# Patient Record
Sex: MALE | Race: WHITE | NOT HISPANIC OR LATINO | Employment: OTHER | URBAN - METROPOLITAN AREA
[De-identification: names, ages, dates, MRNs, and addresses within clinical notes are randomized per-mention and may not be internally consistent; named-entity substitution may affect disease eponyms.]

---

## 2017-02-03 ENCOUNTER — OFFICE VISIT (OUTPATIENT)
Dept: FAMILY MEDICINE | Facility: CLINIC | Age: 47
End: 2017-02-03
Payer: COMMERCIAL

## 2017-02-03 VITALS
TEMPERATURE: 98.1 F | HEIGHT: 68 IN | BODY MASS INDEX: 29.1 KG/M2 | RESPIRATION RATE: 20 BRPM | OXYGEN SATURATION: 98 % | DIASTOLIC BLOOD PRESSURE: 80 MMHG | WEIGHT: 192 LBS | HEART RATE: 61 BPM | SYSTOLIC BLOOD PRESSURE: 127 MMHG

## 2017-02-03 DIAGNOSIS — M25.511 CHRONIC RIGHT SHOULDER PAIN: ICD-10-CM

## 2017-02-03 DIAGNOSIS — Z00.01 ENCOUNTER FOR ROUTINE ADULT MEDICAL EXAM WITH ABNORMAL FINDINGS: Primary | ICD-10-CM

## 2017-02-03 DIAGNOSIS — M25.50 PAIN IN JOINT, MULTIPLE SITES: ICD-10-CM

## 2017-02-03 DIAGNOSIS — G89.29 CHRONIC RIGHT SHOULDER PAIN: ICD-10-CM

## 2017-02-03 LAB
CRP SERPL-MCNC: <2.9 MG/L (ref 0–8)
ERYTHROCYTE [SEDIMENTATION RATE] IN BLOOD BY WESTERGREN METHOD: 4 MM/H (ref 0–15)

## 2017-02-03 PROCEDURE — 80061 LIPID PANEL: CPT | Performed by: FAMILY MEDICINE

## 2017-02-03 PROCEDURE — 85652 RBC SED RATE AUTOMATED: CPT | Performed by: FAMILY MEDICINE

## 2017-02-03 PROCEDURE — 99396 PREV VISIT EST AGE 40-64: CPT | Performed by: FAMILY MEDICINE

## 2017-02-03 PROCEDURE — 36415 COLL VENOUS BLD VENIPUNCTURE: CPT | Performed by: FAMILY MEDICINE

## 2017-02-03 PROCEDURE — 99212 OFFICE O/P EST SF 10 MIN: CPT | Mod: 25 | Performed by: FAMILY MEDICINE

## 2017-02-03 PROCEDURE — 86140 C-REACTIVE PROTEIN: CPT | Performed by: FAMILY MEDICINE

## 2017-02-03 PROCEDURE — 80053 COMPREHEN METABOLIC PANEL: CPT | Performed by: FAMILY MEDICINE

## 2017-02-03 ASSESSMENT — ANXIETY QUESTIONNAIRES
1. FEELING NERVOUS, ANXIOUS, OR ON EDGE: NOT AT ALL
5. BEING SO RESTLESS THAT IT IS HARD TO SIT STILL: NOT AT ALL
7. FEELING AFRAID AS IF SOMETHING AWFUL MIGHT HAPPEN: NOT AT ALL
2. NOT BEING ABLE TO STOP OR CONTROL WORRYING: NOT AT ALL
GAD7 TOTAL SCORE: 0
3. WORRYING TOO MUCH ABOUT DIFFERENT THINGS: NOT AT ALL
IF YOU CHECKED OFF ANY PROBLEMS ON THIS QUESTIONNAIRE, HOW DIFFICULT HAVE THESE PROBLEMS MADE IT FOR YOU TO DO YOUR WORK, TAKE CARE OF THINGS AT HOME, OR GET ALONG WITH OTHER PEOPLE: NOT DIFFICULT AT ALL
6. BECOMING EASILY ANNOYED OR IRRITABLE: NOT AT ALL

## 2017-02-03 ASSESSMENT — PATIENT HEALTH QUESTIONNAIRE - PHQ9: 5. POOR APPETITE OR OVEREATING: NOT AT ALL

## 2017-02-03 NOTE — NURSING NOTE
"Chief Complaint   Patient presents with     Physical       Initial /80 mmHg  Pulse 61  Temp(Src) 98.1  F (36.7  C) (Oral)  Resp 20  Ht 5' 7.5\" (1.715 m)  Wt 192 lb (87.091 kg)  BMI 29.61 kg/m2  SpO2 98% Estimated body mass index is 29.61 kg/(m^2) as calculated from the following:    Height as of this encounter: 5' 7.5\" (1.715 m).    Weight as of this encounter: 192 lb (87.091 kg).  BP completed using cuff size: regine Valencia CMA      "

## 2017-02-03 NOTE — PATIENT INSTRUCTIONS
Preventive Health Recommendations  Male Ages 40 to 49    Yearly exam:             See your health care provider every year in order to  o   Review health changes.   o   Discuss preventive care.    o   Review your medicines if your doctor has prescribed any.    You should be tested each year for STDs (sexually transmitted diseases) if you re at risk.     Have a cholesterol test every 5 years.     Have a colonoscopy (test for colon cancer) if someone in your family has had colon cancer or polyps before age 50.     After age 45, have a diabetes test (fasting glucose). If you are at risk for diabetes, you should have this test every 3 years.      Talk with your health care provider about whether or not a prostate cancer screening test (PSA) is right for you.    Shots: Get a flu shot each year. Get a tetanus shot every 10 years.     Nutrition:    Eat at least 5 servings of fruits and vegetables daily.     Eat whole-grain bread, whole-wheat pasta and brown rice instead of white grains and rice.     Talk to your provider about Calcium and Vitamin D.     Lifestyle    Exercise for at least 150 minutes a week (30 minutes a day, 5 days a week). This will help you control your weight and prevent disease.     Limit alcohol to one drink per day.     No smoking.     Wear sunscreen to prevent skin cancer.     See your dentist every six months for an exam and cleaning.      Preventive Health Recommendations  Male Ages 40 to 49    Yearly exam:             See your health care provider every year in order to  o   Review health changes.   o   Discuss preventive care.    o   Review your medicines if your doctor has prescribed any.    You should be tested each year for STDs (sexually transmitted diseases) if you re at risk.     Have a cholesterol test every 5 years.     Have a colonoscopy (test for colon cancer) if someone in your family has had colon cancer or polyps before age 50.     After age 45, have a diabetes test (fasting  glucose). If you are at risk for diabetes, you should have this test every 3 years.      Talk with your health care provider about whether or not a prostate cancer screening test (PSA) is right for you.    Shots: Get a flu shot each year. Get a tetanus shot every 10 years.     Nutrition:    Eat at least 5 servings of fruits and vegetables daily.     Eat whole-grain bread, whole-wheat pasta and brown rice instead of white grains and rice.     Talk to your provider about Calcium and Vitamin D.     Lifestyle    Exercise for at least 150 minutes a week (30 minutes a day, 5 days a week). This will help you control your weight and prevent disease.     Limit alcohol to one drink per day.     No smoking.     Wear sunscreen to prevent skin cancer.     See your dentist every six months for an exam and cleaning.

## 2017-02-03 NOTE — MR AVS SNAPSHOT
After Visit Summary   2/3/2017    Gregory Aguilera    MRN: [id number]           Patient Information     Date Of Birth          1970        Visit Information        Provider Department      2/3/2017 3:00 PM David Santoyo MD [facility]        Today's Diagnoses     Encounter for routine adult medical exam with abnormal findings    -  1     Need for prophylactic vaccination and inoculation against influenza         Pain in joint, multiple sites           Care Instructions      Preventive Health Recommendations  Male Ages 40 to 49    Yearly exam:             See your health care provider every year in order to  o   Review health changes.   o   Discuss preventive care.    o   Review your medicines if your doctor has prescribed any.    You should be tested each year for STDs (sexually transmitted diseases) if you re at risk.     Have a cholesterol test every 5 years.     Have a colonoscopy (test for colon cancer) if someone in your family has had colon cancer or polyps before age 50.     After age 45, have a diabetes test (fasting glucose). If you are at risk for diabetes, you should have this test every 3 years.      Talk with your health care provider about whether or not a prostate cancer screening test (PSA) is right for you.    Shots: Get a flu shot each year. Get a tetanus shot every 10 years.     Nutrition:    Eat at least 5 servings of fruits and vegetables daily.     Eat whole-grain bread, whole-wheat pasta and brown rice instead of white grains and rice.     Talk to your provider about Calcium and Vitamin D.     Lifestyle    Exercise for at least 150 minutes a week (30 minutes a day, 5 days a week). This will help you control your weight and prevent disease.     Limit alcohol to one drink per day.     No smoking.     Wear sunscreen to prevent skin cancer.     See your dentist every six months for an exam and cleaning.      Preventive Health Recommendations  Male Ages 40  to 49    Yearly exam:             See your health care provider every year in order to  o   Review health changes.   o   Discuss preventive care.    o   Review your medicines if your doctor has prescribed any.    You should be tested each year for STDs (sexually transmitted diseases) if you re at risk.     Have a cholesterol test every 5 years.     Have a colonoscopy (test for colon cancer) if someone in your family has had colon cancer or polyps before age 50.     After age 45, have a diabetes test (fasting glucose). If you are at risk for diabetes, you should have this test every 3 years.      Talk with your health care provider about whether or not a prostate cancer screening test (PSA) is right for you.    Shots: Get a flu shot each year. Get a tetanus shot every 10 years.     Nutrition:    Eat at least 5 servings of fruits and vegetables daily.     Eat whole-grain bread, whole-wheat pasta and brown rice instead of white grains and rice.     Talk to your provider about Calcium and Vitamin D.     Lifestyle    Exercise for at least 150 minutes a week (30 minutes a day, 5 days a week). This will help you control your weight and prevent disease.     Limit alcohol to one drink per day.     No smoking.     Wear sunscreen to prevent skin cancer.     See your dentist every six months for an exam and cleaning.            Follow-ups after your visit        Who to contact     If you have questions or need follow up information about today's clinic visit or your schedule please contact [facility] directly at [phone].  Normal or non-critical lab and imaging results will be communicated to you by MyChart, letter or phone within 4 business days after the clinic has received the results. If you do not hear from us within 7 days, please contact the clinic through MyChart or phone. If you have a critical or abnormal lab result, we will notify you by phone as soon as possible.  Submit refill requests  "through [employer] or call your pharmacy and they will forward the refill request to us. Please allow 3 business days for your refill to be completed.          Additional Information About Your Visit        [employer] Information     [employer] gives you secure access to your electronic health record. If you see a primary care provider, you can also send messages to your care team and make appointments. If you have questions, please call your primary care clinic.  If you do not have a primary care provider, please call [phone] and they will assist you.        Care EveryWhere ID     This is your Care EveryWhere ID. This could be used by other organizations to access your [facility] medical records  [id number]        Your Vitals Were     Pulse Temperature Respirations Height BMI (Body Mass Index) Pulse Oximetry    61 98.1  F (36.7  C) (Oral) 20 5' 7.5\" (1.715 m) 29.61 kg/m2 98%       Blood Pressure from Last 3 Encounters:   02/03/17 127/80   07/07/16 112/88   05/24/16 132/88    Weight from Last 3 Encounters:   02/03/17 192 lb (87.091 kg)   07/07/16 192 lb (87.091 kg)   05/24/16 198 lb 6.4 oz (89.994 kg)              We Performed the Following     Comprehensive metabolic panel (BMP + Alb, Alk Phos, ALT, AST, Total. Bili, TP)     CRP, inflammation     ESR: Erythrocyte sedimentation rate     JUST IN CASE     LIPID REFLEX TO DIRECT LDL PANEL        Primary Care Provider Office Phone # Fax #    David Satnoyo [phone]       [facility] [address]  [facility] [address]        Thank you!     Thank you for choosing [facility]  for your care. Our goal is always to provide you with excellent care. Hearing back from our patients is one way we can continue to improve our services. Please take a few minutes to complete the written survey that you may receive in the mail after your visit with us. Thank you!             Your Updated Medication List - Protect others around you: Learn " how to safely use, store and throw away your medicines at www.disposemymeds.org.          This list is accurate as of: 2/3/17  3:35 PM.  Always use your most recent med list.                   Brand Name Dispense Instructions for use    fluticasone 50 MCG/BLIST Aepb    FLOVENT DISKUS     Inhale 1 puff into the lungs every 12 hours       lisinopril 20 MG tablet    PRINIVIL/ZESTRIL    90 tablet    Take 1 tablet (20 mg) by mouth daily       loratadine 10 MG ODT tab    CLARITIN REDITABS     Take 10 mg by mouth daily       SINGULAIR PO      Take 10 mg by mouth as needed

## 2017-02-03 NOTE — PROGRESS NOTES
"  SUBJECTIVE:     CC: Gregory Aguilera is an 46 year old male who presents for preventative health visit.     Healthy Habits:    Do you get at least three servings of calcium containing foods daily (dairy, green leafy vegetables, etc.)? {YES/NO, DAIRY INTAKE:149632::\"yes\"}    Amount of exercise or daily activities, outside of work: {AMOUNT EXERCISE:613528}    Problems taking medications regularly {Yes /No default:348306::\"No\"}    Medication side effects: {Yes /No default.:762502::\"No\"}    Have you had an eye exam in the past two years? {YESNOBLANK:541721}    Do you see a dentist twice per year? {YESNOBLANK:398110}    Do you have sleep apnea, excessive snoring or daytime drowsiness?{YESNOBLANK:379439}    {Outside tests to abstract? :828015}    {additional problems to add:442446}    Today's PHQ-2 Score:   PHQ-2 ( 1999 Pfizer) 2/3/2017 7/7/2016   Q1: Little interest or pleasure in doing things - 0   Q2: Feeling down, depressed or hopeless - 0   PHQ-2 Score - 0   Little interest or pleasure in doing things Not at all -   Feeling down, depressed or hopeless Not at all -   PHQ-2 Score 0 -     {PHQ-2 LOOK IN ASSESSMENTS :981098}  Abuse: Current or Past(Physical, Sexual or Emotional)- {YES/NO/NA:570106}  Do you feel safe in your environment - {YES/NO/NA:243901}    Social History   Substance Use Topics     Smoking status: Never Smoker      Smokeless tobacco: Never Used     Alcohol Use: Yes      Comment: sporatically on weekends     {ETOH AUDIT:401876}    Last PSA:   PSA   Date Value Ref Range Status   05/13/2009 0.67 0 - 4 ug/L Final       Recent Labs   Lab Test  05/24/16   1002  02/18/15   1538  03/14/14   1019   CHOL  222*  210*  179   HDL  42  48  42   LDL  Cannot estimate LDL when triglyceride exceeds 400 mg/dL  126*  120  97   TRIG  413*  212*  203*   CHOLHDLRATIO   --   4.4  4.3   NHDL  180*   --    --        Reviewed orders with patient. Reviewed health maintenance and updated orders accordingly - " "{Yes/No:402295::\"Yes\"}    All Histories reviewed and updated in Epic.  {HISTORY OPTIONS:860580}    ROS:  {MALE ROS-adult preventive care package:623594::\"C: NEGATIVE for fever, chills, change in weight\",\"I: NEGATIVE for worrisome rashes, moles or lesions\",\"E: NEGATIVE for vision changes or irritation\",\"ENT: NEGATIVE for ear, mouth and throat problems\",\"R: NEGATIVE for significant cough or SOB\",\"CV: NEGATIVE for chest pain, palpitations or peripheral edema\",\"GI: NEGATIVE for nausea, abdominal pain, heartburn, or change in bowel habits\",\" male: negative for dysuria, hematuria, decreased urinary stream, erectile dysfunction, urethral discharge\",\"M: NEGATIVE for significant arthralgias or myalgia\",\"N: NEGATIVE for weakness, dizziness or paresthesias\",\"P: NEGATIVE for changes in mood or affect\"}    {CHRONICPROBDATA:372641}  OBJECTIVE:     There were no vitals taken for this visit.  EXAM:  {Exam Choices:593529}    ASSESSMENT/PLAN:     {Diag Picklist:851411}    COUNSELING:  {MALE COUNSELING MESSAGES:418935::\"Reviewed preventive health counseling, as reflected in patient instructions\"}    {Blood Pressure - Adult Preventive:163199}     reports that he has never smoked. He has never used smokeless tobacco.  {Tobacco Cessation needed for ACO -- Delete if patient is a non-smoker:878285}  Estimated body mass index is 29.20 kg/(m^2) as calculated from the following:    Height as of 5/3/16: 5' 8\" (1.727 m).    Weight as of 7/7/16: 192 lb (87.091 kg).   {Weight Management Plan needed for ACO:434913}    Counseling Resources:  ATP IV Guidelines  Pooled Cohorts Equation Calculator  FRAX Risk Assessment  ICSI Preventive Guidelines  Dietary Guidelines for Americans, 2010  USDA's MyPlate  ASA Prophylaxis  Lung CA Screening    David Santoyo MD  [facility]  "

## 2017-02-03 NOTE — PROGRESS NOTES
SUBJECTIVE:     CC: Gregory Aguilera is an 46 year old male who presents for preventative health visit.     Physical  Annual:     Getting at least 3 servings of Calcium per day::  Yes    Bi-annual eye exam::  Yes    Dental care twice a year::  Yes    Sleep apnea or symptoms of sleep apnea::  Daytime drowsiness and Excessive snoring    Diet::  Regular (no restrictions)    Frequency of exercise::  1 day/week    Duration of exercise::  Less than 15 minutes    Taking medications regularly::  Yes    Additional concerns today::  YES  1; arthralgias. Stiffness at times. Not am more than a few seconds affects the DIP and PIP joints. No catching nor swelling noted. Other joints where he has some trouble include the right shoulder, back and bilateral knees.   2: gerd-still present. meds taking intermittently and helpful.       Musculoskeletal problem/pain      Duration: couple years but worsened, comes and goes    Description  Location: aches in right and left hands, pain in lower back, upper gluteus    Intensity:  mild, moderate    Accompanying signs and symptoms: radiation of pain to right leg    History  Previous similar problem: YES- ongoing for a couple years but the back pain is in a different location  Previous evaluation:  none    Precipitating or alleviating factors:  Trauma or overuse: YES- was hit with a puck during hockey 1 month ago  Aggravating factors include: sitting    Therapies tried and outcome: heat, acetaminophen and NSAID - ibuprofen, just a little relief       Today's PHQ-2 Score:   PHQ-2 ( 1999 Pfizer) 2/3/2017   Q1: Little interest or pleasure in doing things -   Q2: Feeling down, depressed or hopeless -   PHQ-2 Score -   Little interest or pleasure in doing things Not at all   Feeling down, depressed or hopeless Not at all   PHQ-2 Score 0       Abuse: Current or Past(Physical, Sexual or Emotional)- No  Do you feel safe in your environment - Yes    Social History   Substance Use Topics     Smoking  "status: Never Smoker      Smokeless tobacco: Never Used     Alcohol Use: Yes      Comment: sporatically on weekends     The patient does not drink >3 drinks per day nor >7 drinks per week.    Last PSA:   PSA   Date Value Ref Range Status   05/13/2009 0.67 0 - 4 ug/L Final       Recent Labs   Lab Test  05/24/16   1002  02/18/15   1538  03/14/14   1019   CHOL  222*  210*  179   HDL  42  48  42   LDL  Cannot estimate LDL when triglyceride exceeds 400 mg/dL  126*  120  97   TRIG  413*  212*  203*   CHOLHDLRATIO   --   4.4  4.3   NHDL  180*   --    --        Reviewed orders with patient. Reviewed health maintenance and updated orders accordingly - Yes    All Histories reviewed and updated in Epic.      ROS:  C: NEGATIVE for fever, chills, change in weight  I: NEGATIVE for worrisome rashes, moles or lesions  E: NEGATIVE for vision changes or irritation  ENT: NEGATIVE for ear, mouth and throat problems  R: NEGATIVE for significant cough or SOB  CV: NEGATIVE for chest pain, palpitations or peripheral edema   male: negative for dysuria, hematuria, decreased urinary stream, erectile dysfunction, urethral discharge  N: NEGATIVE for weakness, dizziness or paresthesias  P: NEGATIVE for changes in mood or affect    Problem list, Medication list, Allergies, and Medical/Social/Surgical histories reviewed in [facility] and updated as appropriate.  OBJECTIVE:     /80 mmHg  Pulse 61  Temp(Src) 98.1  F (36.7  C) (Oral)  Resp 20  Ht 5' 7.5\" (1.715 m)  Wt 192 lb (87.091 kg)  BMI 29.61 kg/m2  SpO2 98%  EXAM:  GENERAL: healthy, alert and no distress  EYES: Eyes grossly normal to inspection, PERRL and conjunctivae and sclerae normal  HENT: ear canals and TM's normal, nose and mouth without ulcers or lesions  NECK: no adenopathy, no asymmetry, masses, or scars and thyroid normal to palpation  RESP: lungs clear to auscultation - no rales, rhonchi or wheezes  CV: regular rate and rhythm, normal S1 S2, no S3 or S4, no murmur, click or " "rub, no peripheral edema and peripheral pulses strong  ABDOMEN: soft, nontender, no hepatosplenomegaly, no masses and bowel sounds normal  MS: no gross musculoskeletal defects noted, no edema  SKIN: no suspicious lesions or rashes  NEURO: Normal strength and tone, mentation intact and speech normal  PSYCH: mentation appears normal, affect normal/bright  Joint exam is normal. Mild painful arc on right shoulder.     ASSESSMENT/PLAN:         ICD-10-CM    1. Encounter for routine adult medical exam with abnormal findings Z00.01 LIPID REFLEX TO DIRECT LDL PANEL     JUST IN CASE     Comprehensive metabolic panel (BMP + Alb, Alk Phos, ALT, AST, Total. Bili, TP)   2. Pain in joint, multiple sites M25.50 ESR: Erythrocyte sedimentation rate     Comprehensive metabolic panel (BMP + Alb, Alk Phos, ALT, AST, Total. Bili, TP)     CRP, inflammation     OFFICE/OUTPT VISIT,EST,LEVL II   3. Chronic right shoulder pain M25.511 OFFICE/OUTPT VISIT,EST,LEVL II    G89.29    consdir physical therapy for jont. Use of OTC  meds. discussed for gerd and joint pain    COUNSELING:   Reviewed preventive health counseling, as reflected in patient instructions       Regular exercise       Healthy diet/nutrition       Colon cancer screening    BP Screening:   Last 3 BP Readings:    BP Readings from Last 3 Encounters:   02/03/17 127/80   07/07/16 112/88   05/24/16 132/88       The following was recommended to the patient:  Re-screen BP within a year and recommended lifestyle modifications       reports that he has never smoked. He has never used smokeless tobacco.    Estimated body mass index is 29.61 kg/(m^2) as calculated from the following:    Height as of this encounter: 5' 7.5\" (1.715 m).    Weight as of this encounter: 192 lb (87.091 kg).   Weight management plan: Discussed healthy diet and exercise guidelines and patient will follow up in 12 months in clinic to re-evaluate.    Counseling Resources:  ATP IV Guidelines  Pooled Cohorts Equation " Calculator  FRAX Risk Assessment  ICSI Preventive Guidelines  Dietary Guidelines for Americans, 2010  USDA's MyPlate  ASA Prophylaxis  Lung CA Screening    David Santoyo MD  [facility]  Answers for HPI/ROS submitted by the patient on 2/3/2017   PHQ-2 Depressed: Not at all, Not at all  PHQ-2 Score: 0

## 2017-02-04 LAB
ALBUMIN SERPL-MCNC: 4.3 G/DL (ref 3.4–5)
ALP SERPL-CCNC: 56 U/L (ref 40–150)
ALT SERPL W P-5'-P-CCNC: 19 U/L (ref 0–70)
ANION GAP SERPL CALCULATED.3IONS-SCNC: 9 MMOL/L (ref 3–14)
AST SERPL W P-5'-P-CCNC: 11 U/L (ref 0–45)
BILIRUB SERPL-MCNC: 0.8 MG/DL (ref 0.2–1.3)
BUN SERPL-MCNC: 15 MG/DL (ref 7–30)
CALCIUM SERPL-MCNC: 9.2 MG/DL (ref 8.5–10.1)
CHLORIDE SERPL-SCNC: 106 MMOL/L (ref 94–109)
CHOLEST SERPL-MCNC: 219 MG/DL
CO2 SERPL-SCNC: 27 MMOL/L (ref 20–32)
CREAT SERPL-MCNC: 0.96 MG/DL (ref 0.66–1.25)
GFR SERPL CREATININE-BSD FRML MDRD: 84 ML/MIN/1.7M2
GLUCOSE SERPL-MCNC: 97 MG/DL (ref 70–99)
HDLC SERPL-MCNC: 42 MG/DL
LDLC SERPL CALC-MCNC: 125 MG/DL
NONHDLC SERPL-MCNC: 177 MG/DL
POTASSIUM SERPL-SCNC: 4 MMOL/L (ref 3.4–5.3)
PROT SERPL-MCNC: 7.6 G/DL (ref 6.8–8.8)
SODIUM SERPL-SCNC: 142 MMOL/L (ref 133–144)
TRIGL SERPL-MCNC: 259 MG/DL

## 2017-02-04 ASSESSMENT — ANXIETY QUESTIONNAIRES: GAD7 TOTAL SCORE: 0

## 2017-02-04 ASSESSMENT — PATIENT HEALTH QUESTIONNAIRE - PHQ9: SUM OF ALL RESPONSES TO PHQ QUESTIONS 1-9: 2

## 2017-02-28 ENCOUNTER — OFFICE VISIT (OUTPATIENT)
Dept: FAMILY MEDICINE | Facility: CLINIC | Age: 47
End: 2017-02-28
Payer: COMMERCIAL

## 2017-02-28 ENCOUNTER — MYC MEDICAL ADVICE (OUTPATIENT)
Dept: FAMILY MEDICINE | Facility: CLINIC | Age: 47
End: 2017-02-28

## 2017-02-28 VITALS
SYSTOLIC BLOOD PRESSURE: 124 MMHG | RESPIRATION RATE: 16 BRPM | HEART RATE: 88 BPM | OXYGEN SATURATION: 99 % | DIASTOLIC BLOOD PRESSURE: 90 MMHG | BODY MASS INDEX: 30.24 KG/M2 | WEIGHT: 196 LBS | TEMPERATURE: 99.1 F

## 2017-02-28 DIAGNOSIS — M54.10 RADICULOPATHY, UNSPECIFIED SPINAL REGION: Primary | ICD-10-CM

## 2017-02-28 PROCEDURE — 99214 OFFICE O/P EST MOD 30 MIN: CPT | Performed by: FAMILY MEDICINE

## 2017-02-28 RX ORDER — METHYLPREDNISOLONE 4 MG
TABLET, DOSE PACK ORAL
Qty: 21 TABLET | Refills: 0 | Status: SHIPPED | OUTPATIENT
Start: 2017-02-28 | End: 2017-03-21

## 2017-02-28 NOTE — NURSING NOTE
"Chief Complaint   Patient presents with     Musculoskeletal Problem     leg pain       Initial BP (!) 130/100 (BP Location: Right arm, Patient Position: Chair, Cuff Size: Adult Large)  Pulse 88  Temp 99.1  F (37.3  C) (Oral)  Resp 16  Wt 196 lb (88.9 kg)  SpO2 99%  BMI 30.24 kg/m2 Estimated body mass index is 30.24 kg/(m^2) as calculated from the following:    Height as of 2/3/17: 5' 7.5\" (1.715 m).    Weight as of this encounter: 196 lb (88.9 kg).  Medication Reconciliation: complete     Elenita Bennett CMA      "

## 2017-02-28 NOTE — PROGRESS NOTES
SUBJECTIVE:                                                    Gregory Aguilera is a 47 year old male who presents to clinic today for the following health issues:      Musculoskeletal problem/pain      Duration: 3 weeks    Description  Location: right glut, sometimes the low back    Intensity:  moderate, severe    Accompanying signs and symptoms: radiation of pain to down whole leg and tingling    History  Previous similar problem: no   Previous evaluation:  none    Precipitating or alleviating factors:  Trauma or overuse: YES- maybe from hockey 6 weeks ago, fell and got hurt but pain didn't start until 3 weeks later    Aggravating factors include: sitting to standing, standing to sitting, overuse     Therapies tried and outcome: rest/inactivity, heat, acetaminophen and NSAID - Ibuprofen      Hit on the right buttock in hockey and fell onto back both about 6 weeks ago.     No numbness/tingling nor weakness.     No fever nor gu, gi symptoms     No const symptoms.     OBJECTIVE: /90  Pulse 88  Temp 99.1  F (37.3  C) (Oral)  Resp 16  Wt 196 lb (88.9 kg)  SpO2 99%  BMI 30.24 kg/m2  Exam:  GENERAL APPEARANCE: healthy, alert and no distress  EYES: normal   MS: Lumbosacral spine area reveals no local tenderness or mass.  Painful and reduced LS ROM noted. Straight leg raise is negative  bilaterally. DTR's, motor strength and sensation normal.     X-Ray: not indicated.   SKIN: no suspicious lesions or rashes  NEURO: Normal strength and tone, sensory exam grossly normal, mentation intact and speech normal  PSYCH: mentation appears normal and affect normal/bright       ICD-10-CM    1. Radiculopathy, unspecified spinal region M54.10 methylPREDNISolone (MEDROL DOSEPAK) 4 MG tablet     [facility], AND CHIROPRACTIC REFERRAL   unclear etiology. Could be piriformis pain v lumbosacral radilultis from disc/similar at the spinal cord. Symptomatic therapy and follow up discussed

## 2017-02-28 NOTE — MR AVS SNAPSHOT
After Visit Summary   2/28/2017    Gregory Aguilera    MRN: [id number]           Patient Information     Date Of Birth          1970        Visit Information        Provider Department      2/28/2017 3:20 PM David Santoyo MD [facility]        Today's Diagnoses     Radiculopathy, unspecified spinal region    -  1       Follow-ups after your visit        Additional Services     [facility] PT, HAND, AND CHIROPRACTIC REFERRAL       **This order will print in the [facility] Scheduling Office**    Physical Therapy, Hand Therapy and Chiropractic Care are available through:    *[facility]  *[facility]  *[facility]    Call one number to schedule at any of the above locations: [phone].    Your provider has referred you to: Physical Therapy at [facility] or [facility]    Indication/Reason for Referral: Low Back Pain  Onset of Illness: 3 weeks  Therapy Orders: Evaluate and Treat  Special Programs: None  Special Request:     Dhaval Puentes      Additional Comments for the Therapist or Chiropractor:      Please be aware that coverage of these services is subject to the terms and limitations of your health insurance plan.  Call member services at your health plan with any benefit or coverage questions.      Please bring the following to your appointment:    *Your personal calendar for scheduling future appointments  *Comfortable clothing                  Who to contact     If you have questions or need follow up information about today's clinic visit or your schedule please contact [facility] directly at [phone].  Normal or non-critical lab and imaging results will be communicated to you by MyChart, letter or phone within 4 business days after the clinic has received the results. If you do not hear from us within 7 days, please contact the clinic through MyChart or phone. If you have a critical or abnormal lab result, we will  notify you by phone as soon as possible.  Submit refill requests through Friendsee or call your pharmacy and they will forward the refill request to us. Please allow 3 business days for your refill to be completed.          Additional Information About Your Visit        OrganizedWisdomharOctro Information     Friendsee gives you secure access to your electronic health record. If you see a primary care provider, you can also send messages to your care team and make appointments. If you have questions, please call your primary care clinic.  If you do not have a primary care provider, please call [phone] and they will assist you.        Care EveryWhere ID     This is your Care EveryWhere ID. This could be used by other organizations to access your [place] medical records  [id number]        Your Vitals Were     Pulse Temperature Respirations Pulse Oximetry BMI (Body Mass Index)       88 99.1  F (37.3  C) (Oral) 16 99% 30.24 kg/m2        Blood Pressure from Last 3 Encounters:   02/28/17 124/90   02/03/17 127/80   07/07/16 112/88    Weight from Last 3 Encounters:   02/28/17 196 lb (88.9 kg)   02/03/17 192 lb (87.1 kg)   07/07/16 192 lb (87.1 kg)              We Performed the Following     [facility] PT, HAND, AND CHIROPRACTIC REFERRAL          Today's Medication Changes          These changes are accurate as of: 2/28/17  4:46 PM.  If you have any questions, ask your nurse or doctor.               Start taking these medicines.        Dose/Directions    methylPREDNISolone 4 MG tablet   Commonly known as:  MEDROL DOSEPAK   Used for:  Radiculopathy, unspecified spinal region   Started by:  David Santoyo MD        Follow package instructions   Quantity:  21 tablet   Refills:  0            Where to get your medicines      These medications were sent to [place] [address] [place] [address]     Phone:  [phone]     methylPREDNISolone 4 MG tablet                Primary Care  Provider Office Phone # Fax #    David Santoyo [phone]       [facility] [address]  [facility] [address]        Thank you!     Thank you for choosing [facility]  for your care. Our goal is always to provide you with excellent care. Hearing back from our patients is one way we can continue to improve our services. Please take a few minutes to complete the written survey that you may receive in the mail after your visit with us. Thank you!             Your Updated Medication List - Protect others around you: Learn how to safely use, store and throw away your medicines at www.disposemymeds.org.          This list is accurate as of: 2/28/17  4:46 PM.  Always use your most recent med list.                   Brand Name Dispense Instructions for use    fluticasone 50 MCG/BLIST Aepb    FLOVENT DISKUS     Inhale 1 puff into the lungs every 12 hours       lisinopril 20 MG tablet    PRINIVIL/ZESTRIL    90 tablet    Take 1 tablet (20 mg) by mouth daily       loratadine 10 MG ODT tab    CLARITIN REDITABS     Take 10 mg by mouth daily       methylPREDNISolone 4 MG tablet    MEDROL DOSEPAK    21 tablet    Follow package instructions       SINGULAIR PO      Take 10 mg by mouth as needed

## 2017-02-28 NOTE — TELEPHONE ENCOUNTER
.This Asthmatrackerhart message is being routed to provider for response to pt.  Do you advise ortho referral, ortho UC ? No openings at this clinic until Friday unless a same day spot used.  Rukhsana Kim RN

## 2017-03-10 ENCOUNTER — THERAPY VISIT (OUTPATIENT)
Dept: PHYSICAL THERAPY | Facility: CLINIC | Age: 47
End: 2017-03-10
Payer: COMMERCIAL

## 2017-03-10 DIAGNOSIS — M54.41 ACUTE RIGHT-SIDED LOW BACK PAIN WITH RIGHT-SIDED SCIATICA: Primary | ICD-10-CM

## 2017-03-10 PROCEDURE — 97110 THERAPEUTIC EXERCISES: CPT | Mod: GP | Performed by: PHYSICAL THERAPIST

## 2017-03-10 PROCEDURE — 97161 PT EVAL LOW COMPLEX 20 MIN: CPT | Mod: GP | Performed by: PHYSICAL THERAPIST

## 2017-03-10 PROCEDURE — 97112 NEUROMUSCULAR REEDUCATION: CPT | Mod: GP | Performed by: PHYSICAL THERAPIST

## 2017-03-10 NOTE — MR AVS SNAPSHOT
After Visit Summary   3/10/2017    Gregory Aguilera    MRN: [id number]           Patient Information     Date Of Birth          1970        Visit Information        Provider Department      3/10/2017 8:50 AM Pastora Philippe PT [facility] Physical Therapy        Today's Diagnoses     Acute right-sided low back pain with right-sided sciatica    -  1       Follow-ups after your visit        Who to contact     If you have questions or need follow up information about today's clinic visit or your schedule please contact [facility] PHYSICAL THERAPY directly at [phone].  Normal or non-critical lab and imaging results will be communicated to you by CourseWeaverhart, letter or phone within 4 business days after the clinic has received the results. If you do not hear from us within 7 days, please contact the clinic through CourseWeaverhart or phone. If you have a critical or abnormal lab result, we will notify you by phone as soon as possible.  Submit refill requests through Hammerhead Navigation or call your pharmacy and they will forward the refill request to us. Please allow 3 business days for your refill to be completed.          Additional Information About Your Visit        MyChart Information     Hammerhead Navigation gives you secure access to your electronic health record. If you see a primary care provider, you can also send messages to your care team and make appointments. If you have questions, please call your primary care clinic.  If you do not have a primary care provider, please call [phone] and they will assist you.        Care EveryWhere ID     This is your Care EveryWhere ID. This could be used by other organizations to access your [place] medical records  [id number]         Blood Pressure from Last 3 Encounters:   02/28/17 124/90   02/03/17 127/80   07/07/16 112/88    Weight from Last 3 Encounters:   02/28/17 88.9 kg (196 lb)   02/03/17 87.1 kg (192  lb)   07/07/16 87.1 kg (192 lb)              We Performed the Following     CHR[facility] Inital Eval Report     Neuromuscular Re-Education     PT Eval, Low Complexity (20491)     Therapeutic Exercises        Primary Care Provider Office Phone # Fax #    David Santoyo [phone]       [place] [address]  [facility] [address]        Thank you!     Thank you for choosing [place] [facility] PHYSICAL THERAP[facility]  for your care. Our goal is always to provide you with excellent care. Hearing back from our patients is one way we can continue to improve our services. Please take a few minutes to complete the written survey that you may receive in the mail after your visit with us. Thank you!             Your Updated Medication List - Protect others around you: Learn how to safely use, store and throw away your medicines at www.disposemymeds.org.          This list is accurate as of: 3/10/17 11:59 PM.  Always use your most recent med list.                   Brand Name Dispense Instructions for use    fluticasone 50 MCG/BLIST Aepb    FLOVENT DISKUS     Inhale 1 puff into the lungs every 12 hours       lisinopril 20 MG tablet    PRINIVIL/ZESTRIL    90 tablet    Take 1 tablet (20 mg) by mouth daily       loratadine 10 MG ODT tab    CLARITIN REDITABS     Take 10 mg by mouth daily       methylPREDNISolone 4 MG tablet    MEDROL DOSEPAK    21 tablet    Follow package instructions       SINGULAIR PO      Take 10 mg by mouth as needed

## 2017-03-13 PROBLEM — M54.41 ACUTE RIGHT-SIDED LOW BACK PAIN WITH RIGHT-SIDED SCIATICA: Status: ACTIVE | Noted: 2017-03-13

## 2017-03-13 NOTE — PROGRESS NOTES
[facility] Initial Evaluation          Subjective:    Gregory Aguilera is a 47 year old male with a lumbar condition.  Condition occurred with:  Repetition/overuse (Pt. has had a gradual onset of Low back and R LE pain x 6 weeks w/out known incident. The pain has been much better since a steroid dose pack 2 weeks ago. He still has mild c/o LBP and intermittent R LE pain. Pt. has hx of LBP but not LE pain.).  Condition occurred: for unknown reasons.  This is a new condition  1-27-17.    Patient reports pain:  Lumbar spine right.  Radiates to:  Thigh right and lower leg right.  Pain is described as aching and is intermittent and reported as 4/10.  Associated symptoms:  Loss of strength and loss of motion/stiffness. Pain is worse in the P.M..  Symptoms are exacerbated by lifting, bending and sitting and relieved by rest.  Since onset symptoms are gradually improving.  Special testing: none.  Previous treatment: none.    General health as reported by patient is good.                                            Objective:    Standing Alignment:        Lumbar:  Normal            Gait:    Gait Type:  Normal         Flexibility/Screens:   Positive screens:  Lumbar    Lower Extremity:      Decreased right lower extremity flexibility:  Hamstrings  Spine:      Decreased right spine flexibility:  Quadratus Lumborum             Lumbar/SI Evaluation  ROM:    AROM Lumbar:   Flexion:            75%  Ext:                    90%   Side Bend:        Left:  Normal    Right:  Normal  Rotation:           Left:  Normal    Right:  Normal  Side Glide:        Left:     Right:         Strength: Lower abdominals 4/5; lumbar extensors 4/5  Lumbar Myotomes:  normal                Lumbar Dermtomes:  normal                Neural Tension/Mobility:      Right side:   Slump; Herrera-Lumbar and SLR positive.  Lumbar Palpation:      Tenderness present at Right: Quadratus Lumborum and Piriformis      Spinal Segmental Conclusions: No  pain with P/A glide lumbar spine                                                       General     ROS    Assessment/Plan:      Patient is a 47 year old male with lumbar complaints.    Patient has the following significant findings with corresponding treatment plan.                Diagnosis 1:  R lumbar radiculopathy  Pain -  manual therapy, self management and education  Decreased ROM/flexibility - manual therapy and therapeutic exercise  Decreased strength - therapeutic exercise and therapeutic activities  Impaired muscle performance - neuro re-education  Decreased function - therapeutic activities    Therapy Evaluation Codes:   1) History comprised of:   Personal factors that impact the plan of care:      None.    Comorbidity factors that impact the plan of care are:      None.     Medications impacting care: None.  2) Examination of Body Systems comprised of:   Body structures and functions that impact the plan of care:      Lumbar spine.   Activity limitations that impact the plan of care are:      Bending and Sitting.  3) Clinical presentation characteristics are:   Stable/Uncomplicated.  4) Decision-Making    Low complexity using standardized patient assessment instrument and/or measureable assessment of functional outcome.  Cumulative Therapy Evaluation is: Low complexity.    Previous and current functional limitations:  (See Goal Flow Sheet for this information)    Short term and Long term goals: (See Goal Flow Sheet for this information)     Communication ability:  Patient appears to be able to clearly communicate and understand verbal and written communication and follow directions correctly.  Treatment Explanation - The following has been discussed with the patient:   RX ordered/plan of care  Anticipated outcomes  Possible risks and side effects  This patient would benefit from PT intervention to resume normal activities.   Rehab potential is good.    Frequency:  1 X week, once daily  Duration:  for 6  weeks  Discharge Plan:  Achieve all LTG.  Independent in home treatment program.  Reach maximal therapeutic benefit.    Please refer to the daily flowsheet for treatment today, total treatment time and time spent performing 1:1 timed codes.

## 2017-03-20 ENCOUNTER — MYC MEDICAL ADVICE (OUTPATIENT)
Dept: FAMILY MEDICINE | Facility: CLINIC | Age: 47
End: 2017-03-20

## 2017-03-20 NOTE — PROGRESS NOTES
SUBJECTIVE:                                                    Gregory Aguilera is a 47 year old male who presents to clinic today for the following health issues:    1: back pain-better with the medrol but still with aching in buttock and occ pain radiating down. Wonders if could hav another course. Tolerated this well. Returned to doing more activities but still wary about his back.     2: Testicular pain      Duration: off and on for 2 weeks, has had this in the past    Description (location/character/radiation): burning and irritating pain    Intensity:  mild, moderate    Accompanying signs and symptoms: none    History (similar episodes/previous evaluation): yes, has had this in the past, seen urologist Dx epiditimus    Precipitating or alleviating factors: None    Therapies tried and outcome: none     The epididymal pain is not as bad as it was in 2014. No lump noted. No risk of sexually transmitted diseases.     No fever. Nor blood in the urine.     No numbness/tingling     OBJECTIVE: BP (!) 122/92  Pulse 72  Temp 98.1  F (36.7  C) (Oral)  Resp 16  Wt 195 lb (88.5 kg)  SpO2 99%  BMI 30.09 kg/m2 no apparent distress   Lumbosacral spine area reveals no local tenderness or mass.  Painful and reduced LS ROM noted. Straight leg raise is negative  bilaterally. DTR's, motor strength and sensation normal.     X-Ray: not indicated.   Gu: testicles normal b.il except the right epididymis is tender and seems larger than the left. No hernias noted      ICD-10-CM    1. Testicular pain, right N50.811 doxycycline (VIBRAMYCIN) 100 MG capsule   2. Radiculopathy, unspecified spinal region M54.10 methylPREDNISolone (MEDROL DOSEPAK) 4 MG tablet    medrol again. Continue physical therapy. Doxy for the possible epididymitis. follow up discussed.bp follow up discussed.

## 2017-03-21 ENCOUNTER — OFFICE VISIT (OUTPATIENT)
Dept: FAMILY MEDICINE | Facility: CLINIC | Age: 47
End: 2017-03-21
Payer: COMMERCIAL

## 2017-03-21 VITALS
DIASTOLIC BLOOD PRESSURE: 92 MMHG | RESPIRATION RATE: 16 BRPM | TEMPERATURE: 98.1 F | BODY MASS INDEX: 30.09 KG/M2 | HEART RATE: 72 BPM | WEIGHT: 195 LBS | OXYGEN SATURATION: 99 % | SYSTOLIC BLOOD PRESSURE: 122 MMHG

## 2017-03-21 DIAGNOSIS — M54.10 RADICULOPATHY, UNSPECIFIED SPINAL REGION: ICD-10-CM

## 2017-03-21 DIAGNOSIS — N50.811 TESTICULAR PAIN, RIGHT: Primary | ICD-10-CM

## 2017-03-21 PROCEDURE — 99214 OFFICE O/P EST MOD 30 MIN: CPT | Performed by: FAMILY MEDICINE

## 2017-03-21 RX ORDER — DOXYCYCLINE 100 MG/1
100 CAPSULE ORAL 2 TIMES DAILY
Qty: 20 CAPSULE | Refills: 0 | Status: SHIPPED | OUTPATIENT
Start: 2017-03-21 | End: 2017-07-13

## 2017-03-21 RX ORDER — METHYLPREDNISOLONE 4 MG
TABLET, DOSE PACK ORAL
Qty: 21 TABLET | Refills: 0 | Status: SHIPPED | OUTPATIENT
Start: 2017-03-21 | End: 2017-07-13

## 2017-03-21 NOTE — NURSING NOTE
"Chief Complaint   Patient presents with     Testicular/scrotal Pain       Initial BP (!) 124/96 (BP Location: Left arm, Patient Position: Chair, Cuff Size: Adult Large)  Pulse 72  Temp 98.1  F (36.7  C) (Oral)  Resp 16  Wt 195 lb (88.5 kg)  SpO2 99%  BMI 30.09 kg/m2 Estimated body mass index is 30.09 kg/(m^2) as calculated from the following:    Height as of 2/3/17: 5' 7.5\" (1.715 m).    Weight as of this encounter: 195 lb (88.5 kg).  Medication Reconciliation: complete     Elenita Bennett CMA      "

## 2017-03-21 NOTE — MR AVS SNAPSHOT
After Visit Summary   3/21/2017    Gregory Aguilera    MRN: [id number]           Patient Information     Date Of Birth          1970        Visit Information        Provider Department      3/21/2017 9:40 AM David Santoyo MD [facility]        Today's Diagnoses     Testicular pain, right    -  1    Radiculopathy, unspecified spinal region           Follow-ups after your visit        Who to contact     If you have questions or need follow up information about today's clinic visit or your schedule please contact [facility] directly at [phone].  Normal or non-critical lab and imaging results will be communicated to you by Global Acquisition Partnershart, letter or phone within 4 business days after the clinic has received the results. If you do not hear from us within 7 days, please contact the clinic through Global Acquisition Partnershart or phone. If you have a critical or abnormal lab result, we will notify you by phone as soon as possible.  Submit refill requests through Healthy Humans or call your pharmacy and they will forward the refill request to us. Please allow 3 business days for your refill to be completed.          Additional Information About Your Visit        MyChart Information     Healthy Humans gives you secure access to your electronic health record. If you see a primary care provider, you can also send messages to your care team and make appointments. If you have questions, please call your primary care clinic.  If you do not have a primary care provider, please call [phone] and they will assist you.        Care EveryWhere ID     This is your Care EveryWhere ID. This could be used by other organizations to access your [place] medical records  [id number]        Your Vitals Were     Pulse Temperature Respirations Pulse Oximetry BMI (Body Mass Index)       72 98.1  F (36.7  C) (Oral) 16 99% 30.09 kg/m2        Blood Pressure from Last 3 Encounters:   03/21/17 (!) 122/92   02/28/17 124/90    02/03/17 127/80    Weight from Last 3 Encounters:   03/21/17 195 lb (88.5 kg)   02/28/17 196 lb (88.9 kg)   02/03/17 192 lb (87.1 kg)              Today, you had the following     No orders found for display         Today's Medication Changes          These changes are accurate as of: 3/21/17  7:56 PM.  If you have any questions, ask your nurse or doctor.               Start taking these medicines.        Dose/Directions    doxycycline 100 MG capsule   Commonly known as:  VIBRAMYCIN   Used for:  Testicular pain, right   Started by:  David Santoyo MD        Dose:  100 mg   Take 1 capsule (100 mg) by mouth 2 times daily   Quantity:  20 capsule   Refills:  0            Where to get your medicines      These medications were sent to [place] [address] [place] [address] [address]     Phone:  [phone]     doxycycline 100 MG capsule    methylPREDNISolone 4 MG tablet                Primary Care Provider Office Phone # Fax #    David Santoyo [phone]       [address] [address]        Thank you!     Thank you for choosing [facility]  for your care. Our goal is always to provide you with excellent care. Hearing back from our patients is one way we can continue to improve our services. Please take a few minutes to complete the written survey that you may receive in the mail after your visit with us. Thank you!             Your Updated Medication List - Protect others around you: Learn how to safely use, store and throw away your medicines at www.disposemymeds.org.          This list is accurate as of: 3/21/17  7:56 PM.  Always use your most recent med list.                   Brand Name Dispense Instructions for use    doxycycline 100 MG capsule    VIBRAMYCIN    20 capsule    Take 1 capsule (100 mg) by mouth 2 times daily       fluticasone 50 MCG/BLIST Aepb    FLOVENT DISKUS     Inhale 1  puff into the lungs daily as needed       lisinopril 20 MG tablet    PRINIVIL/ZESTRIL    90 tablet    Take 1 tablet (20 mg) by mouth daily       loratadine 10 MG ODT tab    CLARITIN REDITABS     Take 10 mg by mouth as needed       methylPREDNISolone 4 MG tablet    MEDROL DOSEPAK    21 tablet    Follow package instructions       SINGULAIR PO      Take 10 mg by mouth as needed

## 2017-04-19 ENCOUNTER — TRANSFERRED RECORDS (OUTPATIENT)
Dept: HEALTH INFORMATION MANAGEMENT | Facility: CLINIC | Age: 47
End: 2017-04-19

## 2017-05-10 ENCOUNTER — ALLIED HEALTH/NURSE VISIT (OUTPATIENT)
Dept: NURSING | Facility: CLINIC | Age: 47
End: 2017-05-10
Payer: COMMERCIAL

## 2017-05-10 VITALS — SYSTOLIC BLOOD PRESSURE: 120 MMHG | DIASTOLIC BLOOD PRESSURE: 83 MMHG

## 2017-05-10 DIAGNOSIS — I10 HYPERTENSION GOAL BP (BLOOD PRESSURE) < 140/90: Primary | ICD-10-CM

## 2017-05-10 PROCEDURE — 99207 ZZC NO CHARGE NURSE ONLY: CPT

## 2017-05-10 NOTE — NURSING NOTE
"Chief Complaint   Patient presents with     Hypertension       Initial /86 (BP Location: Left arm) Estimated body mass index is 30.09 kg/(m^2) as calculated from the following:    Height as of 2/3/17: 5' 7.5\" (1.715 m).    Weight as of 3/21/17: 195 lb (88.5 kg).  BP check    Slime Valencia CMA]    "

## 2017-05-10 NOTE — MR AVS SNAPSHOT
After Visit Summary   5/10/2017    Gregory Aguilera    MRN: [id number]           Patient Information     Date Of Birth          1970        Visit Information        Provider Department      5/10/2017 3:00 PM [facility]        Today's Diagnoses     Hypertension goal BP (blood pressure) < 140/90    -  1       Follow-ups after your visit        Who to contact     If you have questions or need follow up information about today's clinic visit or your schedule please contact [facility] directly at [phone].  Normal or non-critical lab and imaging results will be communicated to you by LaunchHearhart, letter or phone within 4 business days after the clinic has received the results. If you do not hear from us within 7 days, please contact the clinic through Brookstone or phone. If you have a critical or abnormal lab result, we will notify you by phone as soon as possible.  Submit refill requests through Brookstone or call your pharmacy and they will forward the refill request to us. Please allow 3 business days for your refill to be completed.          Additional Information About Your Visit        MyChart Information     Brookstone gives you secure access to your electronic health record. If you see a primary care provider, you can also send messages to your care team and make appointments. If you have questions, please call your primary care clinic.  If you do not have a primary care provider, please call [phone] and they will assist you.        Care EveryWhere ID     This is your Care EveryWhere ID. This could be used by other organizations to access your [place] medical records  [id number]         Blood Pressure from Last 3 Encounters:   05/10/17 120/83   03/21/17 (!) 122/92   02/28/17 124/90    Weight from Last 3 Encounters:   03/21/17 195 lb (88.5 kg)   02/28/17 196 lb (88.9 kg)   02/03/17 192 lb (87.1 kg)              Today, you had the  following     No orders found for display       Primary Care Provider Office Phone # Fax #    David Santoyo [phone]       [facility] [address]  [facility] [address]        Thank you!     Thank you for choosing [facility]  for your care. Our goal is always to provide you with excellent care. Hearing back from our patients is one way we can continue to improve our services. Please take a few minutes to complete the written survey that you may receive in the mail after your visit with us. Thank you!             Your Updated Medication List - Protect others around you: Learn how to safely use, store and throw away your medicines at www.disposemymeds.org.          This list is accurate as of: 5/10/17  3:49 PM.  Always use your most recent med list.                   Brand Name Dispense Instructions for use    doxycycline 100 MG capsule    VIBRAMYCIN    20 capsule    Take 1 capsule (100 mg) by mouth 2 times daily       fluticasone 50 MCG/BLIST Aepb    FLOVENT DISKUS     Inhale 1 puff into the lungs daily as needed       lisinopril 20 MG tablet    PRINIVIL/ZESTRIL    90 tablet    TAKE 1 TABLET (20 MG) BY MOUTH DAILY       loratadine 10 MG ODT tab    CLARITIN REDITABS     Take 10 mg by mouth as needed       methylPREDNISolone 4 MG tablet    MEDROL DOSEPAK    21 tablet    Follow package instructions       SINGULAIR PO      Take 10 mg by mouth as needed

## 2017-05-30 ENCOUNTER — MYC MEDICAL ADVICE (OUTPATIENT)
Dept: UROLOGY | Facility: CLINIC | Age: 47
End: 2017-05-30

## 2017-07-03 ENCOUNTER — PRE VISIT (OUTPATIENT)
Dept: UROLOGY | Facility: CLINIC | Age: 47
End: 2017-07-03

## 2017-07-03 NOTE — TELEPHONE ENCOUNTER
Patient with history of scrotal pain coming in for new testicular/scrotal pain. Patient chart reviewed, no need for call, all records available and ready for appointment.

## 2017-07-13 ENCOUNTER — OFFICE VISIT (OUTPATIENT)
Dept: UROLOGY | Facility: CLINIC | Age: 47
End: 2017-07-13

## 2017-07-13 VITALS
SYSTOLIC BLOOD PRESSURE: 137 MMHG | HEART RATE: 66 BPM | WEIGHT: 203 LBS | BODY MASS INDEX: 30.77 KG/M2 | HEIGHT: 68 IN | DIASTOLIC BLOOD PRESSURE: 97 MMHG

## 2017-07-13 DIAGNOSIS — N45.1 EPIDIDYMITIS WITHOUT ABSCESS: Primary | ICD-10-CM

## 2017-07-13 RX ORDER — CIPROFLOXACIN 500 MG/1
500 TABLET, FILM COATED ORAL 2 TIMES DAILY
Qty: 28 TABLET | Refills: 0 | Status: SHIPPED | OUTPATIENT
Start: 2017-07-13 | End: 2017-10-31

## 2017-07-13 NOTE — PROGRESS NOTES
I am seeing Gregory Aguilera as a follow up.     HPI:  Pt last seen in 2014 for evaluation of R testicular pain. At that time, he was given course of cipro and educated about supportive measures for pain management. The patient states that since the he has had intermittent recurrence of the pain. He describes the pain as dull and states that it radiates to his suprapubic region and down the ipsilateral thigh. He states that pain is sometimes relieved spontaneously but is sometimes improved with Tylenol. Of note, he reports a recent slipped disk in January. Sometimes the pain is positional, consistent with possible radiculopathy.    REVIEW OF SYSTEMS:  General: negative  Skin: negative  Eyes: negative  Ears/Nose/Throat: negative  Respiratory: negative  Cardiovascular: HTN, GERD.  Gastrointestinal: negative  Genitourinary: see HPI  Musculoskeletal: negative  Neurologic: negative  Psychiatric: negative  Hematologic/Lymphatic/Immunologic: negative  Endocrine: negative    PAST MEDICAL HX:  Past Medical History:   Diagnosis Date     Allergic rhinitis, cause unspecified     Allergic rhinitis     Essential hypertension, benign      History of blood transfusion      Other chronic allergic conjunctivitis     Allergic conjunctivitis       PAST SURG HX:  Past Surgical History:   Procedure Laterality Date     CLOSED REDUCTION, PERCUTANEOUS PINNING FINGER, COMBINED  5/25/2012    Procedure:COMBINED CLOSED REDUCTION, PERCUTANEOUS PINNING FINGER; Closed reductionand fixation by pinning of Right Thumb fracture       COLONOSCOPY N/A 8/25/2014    Procedure: COMBINED COLONOSCOPY, SINGLE BIOPSY/POLYPECTOMY BY BIOPSY;  Surgeon: Misael Jarvis MD;  Location:  GI     DENTAL SURGERY       ESOPHAGOSCOPY, GASTROSCOPY, DUODENOSCOPY (EGD), COMBINED  6/17/2013    Procedure: COMBINED ESOPHAGOSCOPY, GASTROSCOPY, DUODENOSCOPY (EGD), BIOPSY SINGLE OR MULTIPLE;;  Surgeon: Benjy Marinelli MD;  Location:  GI     ESOPHAGOSCOPY,  "GASTROSCOPY, DUODENOSCOPY (EGD), COMBINED N/A 8/25/2014    Procedure: COMBINED ESOPHAGOSCOPY, GASTROSCOPY, DUODENOSCOPY (EGD), BIOPSY SINGLE OR MULTIPLE;  Surgeon: Misael Jarvis MD;  Location: [facility]     LAS BILATERAL       thumb surgery       wisdom teeth removed  8-2014        FAMILY HX:  Family History   Problem Relation Age of Onset     Breast Cancer Mother      Arthritis Father      Genitourinary Problems Father      stones     CANCER Father      skin     C.A.D. Father      stents/age late 60s     Coronary Artery Disease Father      Stents     Prostate Cancer Father      late 50s     Asthma Sister      Depression Sister      CANCER Paternal Grandfather      ? type     Eye Disorder Paternal Grandfather      cateracts       SOCIAL HX:  Social History   Substance Use Topics     Smoking status: Never Smoker     Smokeless tobacco: Never Used     Alcohol use Yes      Comment: sporatically on weekends   Occuaption: .  , 2 children.    MEDICATIONS:  Current Outpatient Prescriptions   Medication Sig     lisinopril (PRINIVIL/ZESTRIL) 20 MG tablet TAKE 1 TABLET (20 MG) BY MOUTH DAILY     Montelukast Sodium (SINGULAIR PO) Take 10 mg by mouth as needed      loratadine (CLARITIN REDITABS) 10 MG dispersible tablet Take 10 mg by mouth as needed      fluticasone (FLOVENT DISKUS) 50 MCG/BLIST AEPB Inhale 1 puff into the lungs daily as needed      methylPREDNISolone (MEDROL DOSEPAK) 4 MG tablet Follow package instructions (Patient not taking: Reported on 7/13/2017)     doxycycline (VIBRAMYCIN) 100 MG capsule Take 1 capsule (100 mg) by mouth 2 times daily (Patient not taking: Reported on 7/13/2017)     No current facility-administered medications for this visit.        ALLERGIES:  Dust mites; Grass; Mold; No known drug allergies; and Trees      GENERAL PHYSICAL EXAM:     BP (!) 137/97 (BP Location: Right arm, Patient Position: Chair, Cuff Size: Adult Regular)  Pulse 66  Ht 1.727 m (5' 8\")  Wt 92.1 kg " (203 lb)  BMI 30.87 kg/m2   Constitutional: No acute distress. Well nourished.   PSYCH: normal mood and affect.  NEURO: normal gait, no focal deficits.   EYES: anicteric, EOMI, PERR.  ENT: neck supple, no lymphadenopathy, mucosae moist, no thrush.  CARDIOPULMONARY: breathing non-labored, pulse regular rate/rhythm, no peripheral edema.  GI: Abdomen soft, non-tender,  surgical scars, no organomegaly.  MUSCULOSKELETAL: normal limb proportions, no muscle wasting, no contractures.  SKIN: Normal virilized hair distribution, no lesions, warts or rashes over genitalia, abdomen extremities or face.  HEME/LYMPH: no echymosis, no lymphadenopathy in groin or neck, no lymphedema.     EXAM:  Phallus  circumcised, meatus adequate, no plaques palpated.   Left testis descended , size is 24-26cc , consistency is nl. No intra-testicular masses.   Right testis descended , size is 24-26cc , consistency is nl. No intra-testicular masses.   Epididymes present bilateral.  Slight induration/tenderness of the right epididymis.  Left cord: Vas present. Slight induration of the left epididymis, this is a little more sensitive than the right side actually.  Right cord: Vas present.  Vasa are a little hard to feel due to thick scrotal skin.    Imaging/labs:  CR     0.96   2/3/2017  PSA     0.67   5/13/2009    US Testicular and Scrotal 10/31/14    IMPRESSION  Impression:   1. Small right-sided hydrocele.  2. Otherwise, normal testicular ultrasound.     ASSESSMENT:   Right epididymis pain.    PLAN:  - Antibiotic course  - Plan to proceed with vasectomy  - Follow-up as needed      I discussed with him at length the risks and benefits of vasectomy. He understands that this is a sterilization procedure, and not reversible contraception. He understands that reversals, while possible, are not guaranteed to work and fairly complex. I discussed with him the option of sperm cryopreservation.     I stressed that he continues to be fertile in the  post-operative period, and that he should continue using other contraceptive methods, such as a condom, until he obtains a semen analysis and we review the results to confirm success of the procedure and infertility. I also stressed to him that recanalization and pregnancy can occur in about 1 per thousand cases, possibly more even after we clear him with a semen analysis showing no motile sperm. I counseled him on the duke-operative risks of bleeding, infection, pain.  I described to him post-vasectomy pain syndrome that can occur in about 1 to 2% of men undergoing vasectomy.     We also discussed recovery times (typically days if no complications) and post-operative care including use of ice packs, pain medication and wound care.    He will think about the above and schedule the procedure if he decides to proceed.    Copied cc to Consulting provider David Benito MD   Urology Resident         I saw and examined the patient with the resident today.  I agree with the resident note and plan of care as above.  He does have some slight sensitivity to the right testicle and epididymis, the left epididymis is more tender to palpation but this side he doesn't complain about day to day.  I personally examined him and discussed the risks/ benefits of vasectomy.  I discussed an increased risk of pain perhaps after vasectomy.  He declines the option to do a semen analysis before the vasectomy, given mild  bilateral epididymal induration.  Briefly discussed surgical options of epididymectomy, microscopic denervation, or orchiectomy for chronic orchialgia/epididymalgia.  It is not clear that his pain warrants surgery at this time.  25min visit, over 50% face to face in counseling/discussion of vasectomy and pain management.    Elliot Jennings MD  Urology Staff

## 2017-07-13 NOTE — LETTER
7/13/2017       RE: Gregory Aguilera  [address]  [place] [address]     Dear Colleague,    Thank you for referring your patient, Gregory Aguilera, to the [facility] UROLOGY AND INST FOR PROSTATE AND UROLOGIC CANCERS at [facility]. Please see a copy of my visit note below.    I am seeing Gregory Aguilera as a follow up.     HPI:  Pt last seen in 2014 for evaluation of R testicular pain. At that time, he was given course of cipro and educated about supportive measures for pain management. The patient states that since the he has had intermittent recurrence of the pain. He describes the pain as dull and states that it radiates to his suprapubic region and down the ipsilateral thigh. He states that pain is sometimes relieved spontaneously but is sometimes improved with Tylenol. Of note, he reports a recent slipped disk in January. Sometimes the pain is positional, consistent with possible radiculopathy.    REVIEW OF SYSTEMS:  General: negative  Skin: negative  Eyes: negative  Ears/Nose/Throat: negative  Respiratory: negative  Cardiovascular: HTN, GERD.  Gastrointestinal: negative  Genitourinary: see HPI  Musculoskeletal: negative  Neurologic: negative  Psychiatric: negative  Hematologic/Lymphatic/Immunologic: negative  Endocrine: negative    PAST MEDICAL HX:  Past Medical History:   Diagnosis Date     Allergic rhinitis, cause unspecified     Allergic rhinitis     Essential hypertension, benign      History of blood transfusion      Other chronic allergic conjunctivitis     Allergic conjunctivitis       PAST SURG HX:  Past Surgical History:   Procedure Laterality Date     CLOSED REDUCTION, PERCUTANEOUS PINNING FINGER, COMBINED  5/25/2012    Procedure:COMBINED CLOSED REDUCTION, PERCUTANEOUS PINNING FINGER; Closed reductionand fixation by pinning of Right Thumb fracture       COLONOSCOPY N/A 8/25/2014    Procedure: COMBINED COLONOSCOPY, SINGLE BIOPSY/POLYPECTOMY BY  BIOPSY;  Surgeon: Misael Jarvis MD;  Location:  GI     DENTAL SURGERY       ESOPHAGOSCOPY, GASTROSCOPY, DUODENOSCOPY (EGD), COMBINED  6/17/2013    Procedure: COMBINED ESOPHAGOSCOPY, GASTROSCOPY, DUODENOSCOPY (EGD), BIOPSY SINGLE OR MULTIPLE;;  Surgeon: Benjy Marinelli MD;  Location: [facility]     ESOPHAGOSCOPY, GASTROSCOPY, DUODENOSCOPY (EGD), COMBINED N/A 8/25/2014    Procedure: COMBINED ESOPHAGOSCOPY, GASTROSCOPY, DUODENOSCOPY (EGD), BIOPSY SINGLE OR MULTIPLE;  Surgeon: Misael Jarvis MD;  Location: [facility]     LASIK BILATERAL       thumb surgery       wisdom teeth removed  8-2014      FAMILY HX:  Family History   Problem Relation Age of Onset     Breast Cancer Mother      Arthritis Father      Genitourinary Problems Father      stones     CANCER Father      skin     C.A.D. Father      stents/age late 60s     Coronary Artery Disease Father      Stents     Prostate Cancer Father      late 50s     Asthma Sister      Depression Sister      CANCER Paternal Grandfather      ? type     Eye Disorder Paternal Grandfather      cateracts       SOCIAL HX:  Social History   Substance Use Topics     Smoking status: Never Smoker     Smokeless tobacco: Never Used     Alcohol use Yes      Comment: sporatically on weekends   Occuaption: .  , 2 children.    MEDICATIONS:  Current Outpatient Prescriptions   Medication Sig     lisinopril (PRINIVIL/ZESTRIL) 20 MG tablet TAKE 1 TABLET (20 MG) BY MOUTH DAILY     Montelukast Sodium (SINGULAIR PO) Take 10 mg by mouth as needed      loratadine (CLARITIN REDITABS) 10 MG dispersible tablet Take 10 mg by mouth as needed      fluticasone (FLOVENT DISKUS) 50 MCG/BLIST AEPB Inhale 1 puff into the lungs daily as needed      methylPREDNISolone (MEDROL DOSEPAK) 4 MG tablet Follow package instructions (Patient not taking: Reported on 7/13/2017)     doxycycline (VIBRAMYCIN) 100 MG capsule Take 1 capsule (100 mg) by mouth 2 times daily (Patient not taking: Reported on  "7/13/2017)     No current facility-administered medications for this visit.        ALLERGIES:  Dust mites; Grass; Mold; No known drug allergies; and Trees      GENERAL PHYSICAL EXAM:     BP (!) 137/97 (BP Location: Right arm, Patient Position: Chair, Cuff Size: Adult Regular)  Pulse 66  Ht 1.727 m (5' 8\")  Wt 92.1 kg (203 lb)  BMI 30.87 kg/m2   Constitutional: No acute distress. Well nourished.   PSYCH: normal mood and affect.  NEURO: normal gait, no focal deficits.   EYES: anicteric, EOMI, PERR.  ENT: neck supple, no lymphadenopathy, mucosae moist, no thrush.  CARDIOPULMONARY: breathing non-labored, pulse regular rate/rhythm, no peripheral edema.  GI: Abdomen soft, non-tender,  surgical scars, no organomegaly.  MUSCULOSKELETAL: normal limb proportions, no muscle wasting, no contractures.  SKIN: Normal virilized hair distribution, no lesions, warts or rashes over genitalia, abdomen extremities or face.  HEME/LYMPH: no echymosis, no lymphadenopathy in groin or neck, no lymphedema.     EXAM:  Phallus  circumcised, meatus adequate, no plaques palpated.   Left testis descended , size is 24-26cc , consistency is nl. No intra-testicular masses.   Right testis descended , size is 24-26cc , consistency is nl. No intra-testicular masses.   Epididymes present bilateral.  Slight induration/tenderness of the right epididymis.  Left cord: Vas present. Slight induration of the left epididymis, this is a little more sensitive than the right side actually.  Right cord: Vas present.  Vasa are a little hard to feel due to thick scrotal skin.    Imaging/labs:  CR     0.96   2/3/2017  PSA     0.67   5/13/2009    US Testicular and Scrotal 10/31/14    IMPRESSION  Impression:   1. Small right-sided hydrocele.  2. Otherwise, normal testicular ultrasound.     ASSESSMENT:   Right epididymis pain.    PLAN:  - Antibiotic course  - Plan to proceed with vasectomy  - Follow-up as needed      I discussed with him at length the risks and " benefits of vasectomy. He understands that this is a sterilization procedure, and not reversible contraception. He understands that reversals, while possible, are not guaranteed to work and fairly complex. I discussed with him the option of sperm cryopreservation.     I stressed that he continues to be fertile in the post-operative period, and that he should continue using other contraceptive methods, such as a condom, until he obtains a semen analysis and we review the results to confirm success of the procedure and infertility. I also stressed to him that recanalization and pregnancy can occur in about 1 per thousand cases, possibly more even after we clear him with a semen analysis showing no motile sperm. I counseled him on the duke-operative risks of bleeding, infection, pain.  I described to him post-vasectomy pain syndrome that can occur in about 1 to 2% of men undergoing vasectomy.     We also discussed recovery times (typically days if no complications) and post-operative care including use of ice packs, pain medication and wound care.    He will think about the above and schedule the procedure if he decides to proceed.    Copied cc to Consulting provider David Benito MD   Urology Resident         I saw and examined the patient with the resident today.  I agree with the resident note and plan of care as above.  He does have some slight sensitivity to the right testicle and epididymis, the left epididymis is more tender to palpation but this side he doesn't complain about day to day.  I personally examined him and discussed the risks/ benefits of vasectomy.  I discussed an increased risk of pain perhaps after vasectomy.  He declines the option to do a semen analysis before the vasectomy, given mild  bilateral epididymal induration.  Briefly discussed surgical options of epididymectomy, microscopic denervation, or orchiectomy for chronic orchialgia/epididymalgia.  It is not clear  that his pain warrants surgery at this time.  25min visit, over 50% face to face in counseling/discussion of vasectomy and pain management.    Elliot Jennings MD  Urology Staff

## 2017-07-13 NOTE — MR AVS SNAPSHOT
After Visit Summary   7/13/2017    Gregory Aguilera    MRN: [id number]           Patient Information     Date Of Birth          1970        Visit Information        Provider Department      7/13/2017 8:40 AM Elliot Jennings MD [facility] Urology and [facility]rologic C[facility]        Today's Diagnoses     Epididymitis without abscess    -  1       Follow-ups after your visit        Follow-up notes from your care team     Return if symptoms worsen or fail to improve.      Who to contact     Please call your clinic at [phone] to:    Ask questions about your health    Make or cancel appointments    Discuss your medicines    Learn about your test results    Speak to your doctor   If you have compliments or concerns about an experience at your clinic, or if you wish to file a complaint, please contact [facility] Patient Relations at [phone] or email us at [email or web address]         Additional Information About Your Visit        MyChart Information     [employer] gives you secure access to your electronic health record. If you see a primary care provider, you can also send messages to your care team and make appointments. If you have questions, please call your primary care clinic.  If you do not have a primary care provider, please call [phone] and they will assist you.      [employer] is an electronic gateway that provides easy, online access to your medical records. With [employer], you can request a clinic appointment, read your test results, renew a prescription or communicate with your care team.     To access your existing account, please contact your [facility] or call [phone] for assistance.        Care EveryWhere ID     This is your Care EveryWhere ID. This could be used by other organizations to access your [place] medical records  [id number]        Your Vitals Were     Pulse Height BMI (Body  "Mass Index)             66 1.727 m (5' 8\") 30.87 kg/m2          Blood Pressure from Last 3 Encounters:   07/13/17 (!) 137/97   05/10/17 120/83   03/21/17 (!) 122/92    Weight from Last 3 Encounters:   07/13/17 92.1 kg (203 lb)   03/21/17 88.5 kg (195 lb)   02/28/17 88.9 kg (196 lb)              Today, you had the following     No orders found for display         Today's Medication Changes          These changes are accurate as of: 7/13/17 11:41 AM.  If you have any questions, ask your nurse or doctor.               Start taking these medicines.        Dose/Directions    ciprofloxacin 500 MG tablet   Commonly known as:  CIPRO   Used for:  Epididymitis without abscess   Started by:  Elliot Jennings MD        Dose:  500 mg   Take 1 tablet (500 mg) by mouth 2 times daily   Quantity:  28 tablet   Refills:  0            Where to get your medicines      These medications were sent to [place] [address] [place] [address]     Phone:  [phone]     ciprofloxacin 500 MG tablet                Primary Care Provider Office Phone # Fax #    David Cleveland Santoyo [phone]       [address] [address]        Equal Access to Services     YOON ENCINAS : Hadii cesario ku hadasho Soomaali, waaxda luqadaha, qaybta kaalmada adeegyada, velma weiss. So [facility] [phone].    ATENCIÓN: Si habla español, tiene a brice disposición servicios gratuitos de asistencia lingüística. Lakia al [phone].    We comply with applicable federal civil rights laws and [place] laws. We do not discriminate on the basis of race, color, national origin, age, disability sex, sexual orientation or gender identity.            Thank you!     Thank you for choosing [facility] UROLOGY AND [facility] FOR PROSTATE AND UROLOGIC CANCERS  for your care. Our goal is always to provide you with excellent care. Hearing back from our " patients is one way we can continue to improve our services. Please take a few minutes to complete the written survey that you may receive in the mail after your visit with us. Thank you!             Your Updated Medication List - Protect others around you: Learn how to safely use, store and throw away your medicines at www.disposemymeds.org.          This list is accurate as of: 7/13/17 11:41 AM.  Always use your most recent med list.                   Brand Name Dispense Instructions for use Diagnosis    ciprofloxacin 500 MG tablet    CIPRO    28 tablet    Take 1 tablet (500 mg) by mouth 2 times daily    Epididymitis without abscess       fluticasone 50 MCG/BLIST Aepb    FLOVENT DISKUS     Inhale 1 puff into the lungs daily as needed        lisinopril 20 MG tablet    PRINIVIL/ZESTRIL    90 tablet    TAKE 1 TABLET (20 MG) BY MOUTH DAILY    Encounter for routine adult medical exam with abnormal findings       loratadine 10 MG ODT tab    CLARITIN REDITABS     Take 10 mg by mouth as needed        SINGULAIR PO      Take 10 mg by mouth as needed

## 2017-07-13 NOTE — NURSING NOTE
"Chief Complaint   Patient presents with     RECHECK     Patient with history of scrotal pain coming in for new testicular/scrotal pain.        Initial BP (!) 137/97 (BP Location: Right arm, Patient Position: Chair, Cuff Size: Adult Regular)  Pulse 66  Ht 1.727 m (5' 8\")  Wt 92.1 kg (203 lb)  BMI 30.87 kg/m2 Estimated body mass index is 30.87 kg/(m^2) as calculated from the following:    Height as of this encounter: 1.727 m (5' 8\").    Weight as of this encounter: 92.1 kg (203 lb).  Medication Reconciliation: complete     Judy Kennedy, student    "

## 2017-10-31 ENCOUNTER — OFFICE VISIT (OUTPATIENT)
Dept: FAMILY MEDICINE | Facility: CLINIC | Age: 47
End: 2017-10-31
Payer: COMMERCIAL

## 2017-10-31 VITALS
SYSTOLIC BLOOD PRESSURE: 138 MMHG | HEART RATE: 80 BPM | RESPIRATION RATE: 18 BRPM | OXYGEN SATURATION: 99 % | TEMPERATURE: 98.2 F | WEIGHT: 195.2 LBS | BODY MASS INDEX: 29.68 KG/M2 | DIASTOLIC BLOOD PRESSURE: 85 MMHG

## 2017-10-31 DIAGNOSIS — K21.9 GASTROESOPHAGEAL REFLUX DISEASE WITHOUT ESOPHAGITIS: Primary | ICD-10-CM

## 2017-10-31 PROCEDURE — 99214 OFFICE O/P EST MOD 30 MIN: CPT | Performed by: FAMILY MEDICINE

## 2017-10-31 RX ORDER — LANSOPRAZOLE 30 MG/1
CAPSULE, DELAYED RELEASE ORAL
Qty: 45 CAPSULE | Refills: 1 | Status: SHIPPED | OUTPATIENT
Start: 2017-10-31 | End: 2018-01-11

## 2017-10-31 RX ORDER — CYCLOBENZAPRINE HCL 10 MG
10 TABLET ORAL
COMMUNITY
Start: 2017-10-28 | End: 2020-09-18

## 2017-10-31 NOTE — PROGRESS NOTES
SUBJECTIVE:   Gregory Aguilera is a 47 year old male who presents to clinic today for the following health issues:    GERD/Heartburn      Duration: 10 to 12 days    Intensity:  mild, moderate    Accompanying signs and symptoms:  food getting stuck: no   nausea/vomiting/blood: no   abdominal pain: YES  black/tarry or bloody stools: no :    History (similar episodes/previous evaluation): None    Precipitating or alleviating factors:  worse with fatty foods and spicy foods.  current NSAID/Aspirin use: YES    Therapies tried and outcome: Omeprazole (Prilosec)    No unexpecedweight loss, no dysphagia. Most of the pain is in the chest with some in the epigastric area. symptoms ar worse at night and with certain foods. . Not associated with exercise.     He reports no change in habits or stress levels.     Tried nexium for the past 1-2 weeks without change in symptoms     med hx, family hx, and soc hx reviewed and updated     No const, other gi, cv, resp symptoms     Some sore throat noted.     OBJECTIVE: /85  Pulse 80  Temp 98.2  F (36.8  C) (Oral)  Resp 18  Wt 195 lb 3.2 oz (88.5 kg)  SpO2 99%  BMI 29.68 kg/m2 Exam:  GENERAL APPEARANCE: healthy, alert and no distress  EYES: Eyes grossly normal to inspection  HENT: ear canals and TM's normal and nose and mouth without ulcers or lesions  NECK: no adenopathy, no asymmetry, masses, or scars and thyroid normal to palpation  RESP: lungs clear to auscultation - no rales, rhonchi or wheezes  CV: regular rates and rhythm, normal S1 S2, no S3 or S4 and no murmur, click or rub -  ABDOMEN:  soft, nontender, no HSM or masses and bowel sounds normal  SKIN: no suspicious lesions or rashes  PSYCH: mentation appears normal and affect normal/bright       ICD-10-CM    1. Gastroesophageal reflux disease without esophagitis K21.9 LANsoprazole (PREVACID) 30 MG CR capsule    I doubt this represent other pathology. He had a EGD 3 years ago an was normal. We could consider redoing  this if the prevacid at twice daily does not help his symptoms. Discussed diet changes.

## 2017-10-31 NOTE — MR AVS SNAPSHOT
After Visit Summary   10/31/2017    Gregory Aguilera    MRN: [id number]           Patient Information     Date Of Birth          1970        Visit Information        Provider Department      10/31/2017 1:20 PM David Santoyo MD [facility]        Today's Diagnoses     Gastroesophageal reflux disease without esophagitis    -  1      Care Instructions    To start sleep evaluation, you can do this online through [email or web address].           Follow-ups after your visit        Who to contact     If you have questions or need follow up information about today's clinic visit or your schedule please contact [facility] directly at [phone].  Normal or non-critical lab and imaging results will be communicated to you by MyChart, letter or phone within 4 business days after the clinic has received the results. If you do not hear from us within 7 days, please contact the clinic through [email or web address] or phone. If you have a critical or abnormal lab result, we will notify you by phone as soon as possible.  Submit refill requests through [employer] or call your pharmacy and they will forward the refill request to us. Please allow 3 business days for your refill to be completed.          Additional Information About Your Visit        MyChart Information     [employer] gives you secure access to your electronic health record. If you see a primary care provider, you can also send messages to your care team and make appointments. If you have questions, please call your primary care clinic.  If you do not have a primary care provider, please call [phone] and they will assist you.        Care EveryWhere ID     This is your Care EveryWhere ID. This could be used by other organizations to access your [facility] medical records  [id number]        Your Vitals Were     Pulse Temperature Respirations Pulse Oximetry BMI (Body Mass Index)       80 98.2  F (36.8  C) (Oral) 18 99% 29.68  kg/m2        Blood Pressure from Last 3 Encounters:   10/31/17 138/85   07/13/17 (!) 137/97   05/10/17 120/83    Weight from Last 3 Encounters:   10/31/17 195 lb 3.2 oz (88.5 kg)   07/13/17 203 lb (92.1 kg)   03/21/17 195 lb (88.5 kg)              Today, you had the following     No orders found for display         Today's Medication Changes          These changes are accurate as of: 10/31/17  1:56 PM.  If you have any questions, ask your nurse or doctor.               Start taking these medicines.        Dose/Directions    LANsoprazole 30 MG CR capsule   Commonly known as:  PREVACID   Used for:  Gastroesophageal reflux disease without esophagitis   Started by:  David Santoyo MD        One twice daily for 2 weeks then one daily take 30-60 minutes before a meal.   Quantity:  45 capsule   Refills:  1         Stop taking these medicines if you haven't already. Please contact your care team if you have questions.     ciprofloxacin 500 MG tablet   Commonly known as:  CIPRO   Stopped by:  David Santoyo MD                Where to get your medicines      These medications were sent to [place] [address] [place] [address]     Phone:  [phone]     LANsoprazole 30 MG CR capsule                Primary Care Provider Office Phone # Fax #    David Santoyo [phone]       [address]  [facility] [address]        Equal Access to Services     [facility]: Hadii cesario tuckero Somadhavi, waaxda luqadaha, qaybta kaalmada tobias, waxay mary jo blackwell adebridget weiss. So [facility] [phone].    ATENCIÓN: Si habla español, tiene a brice disposición servicios gratuitos de asistencia lingüística. Llame al [phone].    We comply with applicable federal civil rights laws and [place] laws. We do not discriminate on the basis of race, color, national origin, age, disability, sex, sexual orientation, or gender identity.            Thank  you!     Thank you for choosing [facility]  for your care. Our goal is always to provide you with excellent care. Hearing back from our patients is one way we can continue to improve our services. Please take a few minutes to complete the written survey that you may receive in the mail after your visit with us. Thank you!             Your Updated Medication List - Protect others around you: Learn how to safely use, store and throw away your medicines at www.disposemymeds.org.          This list is accurate as of: 10/31/17  1:56 PM.  Always use your most recent med list.                   Brand Name Dispense Instructions for use Diagnosis    cyclobenzaprine 10 MG tablet    FLEXERIL     Take 10 mg by mouth        fluticasone 50 MCG/BLIST Aepb    FLOVENT DISKUS     Inhale 1 puff into the lungs daily as needed        LANsoprazole 30 MG CR capsule    PREVACID    45 capsule    One twice daily for 2 weeks then one daily take 30-60 minutes before a meal.    Gastroesophageal reflux disease without esophagitis       lisinopril 20 MG tablet    PRINIVIL/ZESTRIL    90 tablet    TAKE 1 TABLET (20 MG) BY MOUTH DAILY    Encounter for routine adult medical exam with abnormal findings       loratadine 10 MG ODT tab    CLARITIN REDITABS     Take 10 mg by mouth as needed        SINGULAIR PO      Take 10 mg by mouth as needed

## 2017-10-31 NOTE — NURSING NOTE
"Chief Complaint   Patient presents with     Gastrointestinal Problem       Initial /85  Pulse 80  Temp 98.2  F (36.8  C) (Oral)  Resp 18  Wt 195 lb 3.2 oz (88.5 kg)  SpO2 99%  BMI 29.68 kg/m2 Estimated body mass index is 29.68 kg/(m^2) as calculated from the following:    Height as of 7/13/17: 5' 8\" (1.727 m).    Weight as of this encounter: 195 lb 3.2 oz (88.5 kg).  Medication Reconciliation: complete       Shanon Sharp MA    "

## 2018-01-11 ENCOUNTER — MYC MEDICAL ADVICE (OUTPATIENT)
Dept: FAMILY MEDICINE | Facility: CLINIC | Age: 48
End: 2018-01-11

## 2018-01-11 DIAGNOSIS — K21.9 GASTROESOPHAGEAL REFLUX DISEASE WITHOUT ESOPHAGITIS: ICD-10-CM

## 2018-01-11 NOTE — TELEPHONE ENCOUNTER
DL review the my chart message.  If you send the Lansoprazole please check the dosing and amount .  Or recommend something else?      Time for referral to GI?

## 2018-01-15 RX ORDER — LANSOPRAZOLE 30 MG/1
30 CAPSULE, DELAYED RELEASE ORAL
Qty: 60 CAPSULE | Refills: 0 | Status: SHIPPED | OUTPATIENT
Start: 2018-01-15 | End: 2018-02-20

## 2018-01-15 NOTE — TELEPHONE ENCOUNTER
If symptoms are worse he can increase to twcie daily. Once symptoms controlled again he could try cutting back to once daily.    Ok to refer to GI for consultation. Not sure if he should have a scope again.     David Santoyo

## 2018-02-02 ENCOUNTER — TRANSFERRED RECORDS (OUTPATIENT)
Dept: HEALTH INFORMATION MANAGEMENT | Facility: CLINIC | Age: 48
End: 2018-02-02

## 2018-02-03 DIAGNOSIS — Z00.01 ENCOUNTER FOR ROUTINE ADULT MEDICAL EXAM WITH ABNORMAL FINDINGS: ICD-10-CM

## 2018-02-03 NOTE — TELEPHONE ENCOUNTER
"Requested Prescriptions   Pending Prescriptions Disp Refills     lisinopril (PRINIVIL/ZESTRIL) 20 MG tablet [Pharmacy Med Name: LISINOPRIL 20 MG TABLET]  Last Written Prescription Date:  5/9/2017  Last Fill Quantity: 90 tabs,  # refills: 2   Last Office Visit with [facility], P or [facility] prescribing provider:  12/6/2013  Future Office Visit:      90 tablet 2     Sig: TAKE 1 TABLET (20 MG) BY MOUTH DAILY    ACE Inhibitors (Including Combos) Protocol Passed    2/3/2018 11:41 AM       Passed - Blood pressure under 140/90    BP Readings from Last 3 Encounters:   10/31/17 138/85   07/13/17 (!) 137/97   05/10/17 120/83                Passed - Recent or future visit with authorizing provider's specialty    Patient had office visit in the last year or has a visit in the next 30 days with authorizing provider.  See \"Patient Info\" tab in inbasket, or \"Choose Columns\" in Meds & Orders section of the refill encounter.            Passed - Patient is age 18 or older       Passed - Normal serum creatinine on file in past 12 months    Recent Labs   Lab Test  02/03/17   1538   CR  0.96            Passed - Normal serum potassium on file in past 12 months    Recent Labs   Lab Test  02/03/17   1538   POTASSIUM  4.0               "

## 2018-02-08 RX ORDER — LISINOPRIL 20 MG/1
TABLET ORAL
Qty: 90 TABLET | Refills: 0 | Status: SHIPPED | OUTPATIENT
Start: 2018-02-08 | End: 2018-02-20

## 2018-02-20 ENCOUNTER — OFFICE VISIT (OUTPATIENT)
Dept: FAMILY MEDICINE | Facility: CLINIC | Age: 48
End: 2018-02-20
Payer: COMMERCIAL

## 2018-02-20 VITALS
TEMPERATURE: 98.4 F | SYSTOLIC BLOOD PRESSURE: 128 MMHG | WEIGHT: 192 LBS | BODY MASS INDEX: 29.1 KG/M2 | RESPIRATION RATE: 18 BRPM | HEART RATE: 80 BPM | HEIGHT: 68 IN | DIASTOLIC BLOOD PRESSURE: 84 MMHG

## 2018-02-20 DIAGNOSIS — Z23 NEED FOR PROPHYLACTIC VACCINATION AND INOCULATION AGAINST INFLUENZA: ICD-10-CM

## 2018-02-20 DIAGNOSIS — Z00.01 ENCOUNTER FOR ROUTINE ADULT MEDICAL EXAM WITH ABNORMAL FINDINGS: Primary | ICD-10-CM

## 2018-02-20 DIAGNOSIS — I10 HYPERTENSION GOAL BP (BLOOD PRESSURE) < 140/90: ICD-10-CM

## 2018-02-20 LAB
ALBUMIN UR-MCNC: NEGATIVE MG/DL
ANION GAP SERPL CALCULATED.3IONS-SCNC: 6 MMOL/L (ref 3–14)
APPEARANCE UR: CLEAR
BILIRUB UR QL STRIP: NEGATIVE
BUN SERPL-MCNC: 14 MG/DL (ref 7–30)
CALCIUM SERPL-MCNC: 8.7 MG/DL (ref 8.5–10.1)
CHLORIDE SERPL-SCNC: 108 MMOL/L (ref 94–109)
CHOLEST SERPL-MCNC: 198 MG/DL
CO2 SERPL-SCNC: 28 MMOL/L (ref 20–32)
COLOR UR AUTO: YELLOW
CREAT SERPL-MCNC: 0.92 MG/DL (ref 0.66–1.25)
GFR SERPL CREATININE-BSD FRML MDRD: 87 ML/MIN/1.7M2
GLUCOSE SERPL-MCNC: 91 MG/DL (ref 70–99)
GLUCOSE UR STRIP-MCNC: NEGATIVE MG/DL
HDLC SERPL-MCNC: 40 MG/DL
HGB UR QL STRIP: NEGATIVE
KETONES UR STRIP-MCNC: NEGATIVE MG/DL
LDLC SERPL CALC-MCNC: 102 MG/DL
LEUKOCYTE ESTERASE UR QL STRIP: NEGATIVE
NITRATE UR QL: NEGATIVE
NONHDLC SERPL-MCNC: 158 MG/DL
PH UR STRIP: 5.5 PH (ref 5–7)
POTASSIUM SERPL-SCNC: 3.8 MMOL/L (ref 3.4–5.3)
SODIUM SERPL-SCNC: 142 MMOL/L (ref 133–144)
SOURCE: NORMAL
SP GR UR STRIP: 1.02 (ref 1–1.03)
TRIGL SERPL-MCNC: 280 MG/DL
UROBILINOGEN UR STRIP-ACNC: 0.2 EU/DL (ref 0.2–1)

## 2018-02-20 PROCEDURE — 80061 LIPID PANEL: CPT | Performed by: FAMILY MEDICINE

## 2018-02-20 PROCEDURE — 80048 BASIC METABOLIC PNL TOTAL CA: CPT | Performed by: FAMILY MEDICINE

## 2018-02-20 PROCEDURE — 99396 PREV VISIT EST AGE 40-64: CPT | Performed by: FAMILY MEDICINE

## 2018-02-20 PROCEDURE — 81003 URINALYSIS AUTO W/O SCOPE: CPT | Performed by: FAMILY MEDICINE

## 2018-02-20 PROCEDURE — 36415 COLL VENOUS BLD VENIPUNCTURE: CPT | Performed by: FAMILY MEDICINE

## 2018-02-20 RX ORDER — LISINOPRIL 20 MG/1
TABLET ORAL
Qty: 90 TABLET | Refills: 3 | Status: SHIPPED | OUTPATIENT
Start: 2018-02-20 | End: 2019-02-01

## 2018-02-20 ASSESSMENT — PATIENT HEALTH QUESTIONNAIRE - PHQ9: 5. POOR APPETITE OR OVEREATING: NOT AT ALL

## 2018-02-20 ASSESSMENT — ANXIETY QUESTIONNAIRES
IF YOU CHECKED OFF ANY PROBLEMS ON THIS QUESTIONNAIRE, HOW DIFFICULT HAVE THESE PROBLEMS MADE IT FOR YOU TO DO YOUR WORK, TAKE CARE OF THINGS AT HOME, OR GET ALONG WITH OTHER PEOPLE: NOT DIFFICULT AT ALL
3. WORRYING TOO MUCH ABOUT DIFFERENT THINGS: NOT AT ALL
6. BECOMING EASILY ANNOYED OR IRRITABLE: NOT AT ALL
GAD7 TOTAL SCORE: 0
7. FEELING AFRAID AS IF SOMETHING AWFUL MIGHT HAPPEN: NOT AT ALL
5. BEING SO RESTLESS THAT IT IS HARD TO SIT STILL: NOT AT ALL
2. NOT BEING ABLE TO STOP OR CONTROL WORRYING: NOT AT ALL
1. FEELING NERVOUS, ANXIOUS, OR ON EDGE: NOT AT ALL

## 2018-02-20 NOTE — PROGRESS NOTES
SUBJECTIVE:   CC: Gregory Aguilera is an 47 year old male who presents for preventative health visit.     Physical   Annual:     Getting at least 3 servings of Calcium per day::  NO    Bi-annual eye exam::  Yes    Dental care twice a year::  Yes    Sleep apnea or symptoms of sleep apnea::  Daytime drowsiness and Excessive snoring    Diet::  Regular (no restrictions) and Breakfast skipped    Frequency of exercise::  1 day/week    Duration of exercise::  Less than 15 minutes    Taking medications regularly::  Yes    Medication side effects::  None    Additional concerns today::  YES            Other Concern:  Having urinary issues, frequency.     -------------------------------------    Today's PHQ-2 Score:   PHQ-2 ( 1999 Pfizer) 2/20/2018   Q1: Little interest or pleasure in doing things 0   Q2: Feeling down, depressed or hopeless 0   PHQ-2 Score 0   Q1: Little interest or pleasure in doing things Not at all   Q2: Feeling down, depressed or hopeless Not at all   PHQ-2 Score 0       Abuse: Current or Past(Physical, Sexual or Emotional)- No  Do you feel safe in your environment - Yes    Social History   Substance Use Topics     Smoking status: Never Smoker     Smokeless tobacco: Never Used     Alcohol use Yes      Comment: sporatically on weekends     Alcohol Use 2/20/2018   If you drink alcohol, do you typically have greater than 3 drinks per day OR greater than 7 drinks per week?   No   No flowsheet data found.    Last PSA:   PSA   Date Value Ref Range Status   05/13/2009 0.67 0 - 4 ug/L Final       Reviewed orders with patient. Reviewed health maintenance and updated orders accordingly - Yes  Labs reviewed in EPIC    Reviewed and updated as needed this visit by clinical staff  Tobacco  Allergies  Med Hx  Surg Hx  Fam Hx  Soc Hx        Reviewed and updated as needed this visit by Provider            Review of Systems  C: NEGATIVE for fever, chills, change in weight  I: NEGATIVE for worrisome rashes, moles or  "lesions  E: NEGATIVE for vision changes or irritation  ENT: NEGATIVE for ear, mouth and throat problems  R: NEGATIVE for significant cough or SOB  CV: NEGATIVE for chest pain, palpitations or peripheral edema  GI: NEGATIVE for nausea, abdominal pain, heartburn, or change in bowel habits  M: NEGATIVE for significant arthralgias or myalgia  N: NEGATIVE for weakness, dizziness or paresthesias  P: NEGATIVE for changes in mood or affect    OBJECTIVE:   /84  Pulse 80  Temp 98.4  F (36.9  C) (Oral)  Resp 18  Ht 5' 7.75\" (1.721 m)  Wt 192 lb (87.1 kg)  BMI 29.41 kg/m2    Physical Exam  GENERAL: healthy, alert and no distress  EYES: Eyes grossly normal to inspection, PERRL and conjunctivae and sclerae normal  HENT: ear canals and TM's normal, nose and mouth without ulcers or lesions  NECK: no adenopathy, no asymmetry, masses, or scars and thyroid normal to palpation  RESP: lungs clear to auscultation - no rales, rhonchi or wheezes  CV: regular rate and rhythm, normal S1 S2, no S3 or S4, no murmur, click or rub, no peripheral edema and peripheral pulses strong  ABDOMEN: soft, nontender, no hepatosplenomegaly, no masses and bowel sounds normal  MS: no gross musculoskeletal defects noted, no edema  SKIN: no suspicious lesions or rashes  NEURO: Normal strength and tone, mentation intact and speech normal  PSYCH: mentation appears normal, affect normal/bright    ASSESSMENT/PLAN:       ICD-10-CM    1. Encounter for routine adult medical exam with abnormal findings Z00.01 lisinopril (PRINIVIL/ZESTRIL) 20 MG tablet     Basic metabolic panel     *UA reflex to Microscopic and Culture ([place] and [facility] (except [place] and [place])     JUST IN CASE   2. Need for prophylactic vaccination and inoculation against influenza Z23    3. Hypertension goal BP (blood pressure) < 140/90 I10 Basic metabolic panel     Lipid panel reflex to direct LDL Fasting       COUNSELING:   Reviewed preventive health counseling, as " "reflected in patient instructions       Regular exercise       Healthy diet/nutrition         reports that he has never smoked. He has never used smokeless tobacco.    Estimated body mass index is 29.41 kg/(m^2) as calculated from the following:    Height as of this encounter: 5' 7.75\" (1.721 m).    Weight as of this encounter: 192 lb (87.1 kg).   Weight management plan: Discussed healthy diet and exercise guidelines and patient will follow up in 12 months in clinic to re-evaluate.    Counseling Resources:  ATP IV Guidelines  Pooled Cohorts Equation Calculator  FRAX Risk Assessment  ICSI Preventive Guidelines  Dietary Guidelines for Americans, 2010  USDA's MyPlate  ASA Prophylaxis  Lung CA Screening    David Santoyo MD  [facility]  Answers for HPI/ROS submitted by the patient on 2/20/2018   PHQ-2 Score: 0    "

## 2018-02-20 NOTE — MR AVS SNAPSHOT
After Visit Summary   2/20/2018    Gregory Aguilera    MRN: [id number]           Patient Information     Date Of Birth          1970        Visit Information        Provider Department      2/20/2018 10:00 AM David Santoyo MD [facility]        Today's Diagnoses     Encounter for routine adult medical exam with abnormal findings    -  1    Need for prophylactic vaccination and inoculation against influenza        Hypertension goal BP (blood pressure) < 140/90          Care Instructions      Preventive Health Recommendations  Male Ages 40 to 49    Yearly exam:             See your health care provider every year in order to  o   Review health changes.   o   Discuss preventive care.    o   Review your medicines if your doctor has prescribed any.    You should be tested each year for STDs (sexually transmitted diseases) if you re at risk.     Have a cholesterol test every 5 years.     Have a colonoscopy (test for colon cancer) if someone in your family has had colon cancer or polyps before age 50.     After age 45, have a diabetes test (fasting glucose). If you are at risk for diabetes, you should have this test every 3 years.      Talk with your health care provider about whether or not a prostate cancer screening test (PSA) is right for you.    Shots: Get a flu shot each year. Get a tetanus shot every 10 years.     Nutrition:    Eat at least 5 servings of fruits and vegetables daily.     Eat whole-grain bread, whole-wheat pasta and brown rice instead of white grains and rice.     Talk to your provider about Calcium and Vitamin D.     Lifestyle    Exercise for at least 150 minutes a week (30 minutes a day, 5 days a week). This will help you control your weight and prevent disease.     Limit alcohol to one drink per day.     No smoking.     Wear sunscreen to prevent skin cancer.     See your dentist every six months for an exam and cleaning.              Follow-ups after your  "visit        Who to contact     If you have questions or need follow up information about today's clinic visit or your schedule please contact [facility] directly at [phone].  Normal or non-critical lab and imaging results will be communicated to you by MyChart, letter or phone within 4 business days after the clinic has received the results. If you do not hear from us within 7 days, please contact the clinic through Syncplicityhart or phone. If you have a critical or abnormal lab result, we will notify you by phone as soon as possible.  Submit refill requests through [employer] or call your pharmacy and they will forward the refill request to us. Please allow 3 business days for your refill to be completed.          Additional Information About Your Visit        [employer] Information     [employer] gives you secure access to your electronic health record. If you see a primary care provider, you can also send messages to your care team and make appointments. If you have questions, please call your primary care clinic.  If you do not have a primary care provider, please call [phone] and they will assist you.        Care EveryWhere ID     This is your Care EveryWhere ID. This could be used by other organizations to access your [place] medical records  [id number]        Your Vitals Were     Pulse Temperature Respirations Height BMI (Body Mass Index)       80 98.4  F (36.9  C) (Oral) 18 5' 7.75\" (1.721 m) 29.41 kg/m2        Blood Pressure from Last 3 Encounters:   02/20/18 128/84   10/31/17 138/85   07/13/17 (!) 137/97    Weight from Last 3 Encounters:   02/20/18 192 lb (87.1 kg)   10/31/17 195 lb 3.2 oz (88.5 kg)   07/13/17 203 lb (92.1 kg)              We Performed the Following     *UA reflex to Microscopic and Culture ([place] and [facility] (except [place] and [place])     Basic metabolic panel     JUST IN CASE     Lipid panel reflex to direct LDL Fasting          Today's Medication Changes "          These changes are accurate as of 2/20/18 10:32 AM.  If you have any questions, ask your nurse or doctor.               These medicines have changed or have updated prescriptions.        Dose/Directions    lisinopril 20 MG tablet   Commonly known as:  PRINIVIL/ZESTRIL   This may have changed:  See the new instructions.   Used for:  Encounter for routine adult medical exam with abnormal findings   Changed by:  David Santoyo MD        TAKE 1 TABLET (20 MG) BY MOUTH DAILY   Quantity:  90 tablet   Refills:  3            Where to get your medicines      These medications were sent to [place] [address] [place] [facility] - [address] [address]     Phone:  [phone]     lisinopril 20 MG tablet                Primary Care Provider Office Phone # Fax #    David Santoyo [phone] [address]  [facility] [address]        Equal Access to Services     [facility]: Hadii aad ku hadasho Soomaali, waaxda luqadaha, qaybta kaalmada adeegyada, waxay idiin hayaan janine wooarakendrick newman . So [facility] [phone].    ATENCIÓN: Si habla español, tiene a brice disposición servicios gratuitos de asistencia lingüística. [facility] [phone].    We comply with applicable federal civil rights laws and [place] laws. We do not discriminate on the basis of race, color, national origin, age, disability, sex, sexual orientation, or gender identity.            Thank you!     Thank you for choosing [facility]  for your care. Our goal is always to provide you with excellent care. Hearing back from our patients is one way we can continue to improve our services. Please take a few minutes to complete the written survey that you may receive in the mail after your visit with us. Thank you!             Your Updated Medication List - Protect others around you: Learn how to safely use, store and throw away your medicines at www.disposemymeds.org.           This list is accurate as of 2/20/18 10:32 AM.  Always use your most recent med list.                   Brand Name Dispense Instructions for use Diagnosis    cyclobenzaprine 10 MG tablet    FLEXERIL     Take 10 mg by mouth        fluticasone 50 MCG/BLIST Aepb    FLOVENT DISKUS     Inhale 1 puff into the lungs daily as needed        lisinopril 20 MG tablet    PRINIVIL/ZESTRIL    90 tablet    TAKE 1 TABLET (20 MG) BY MOUTH DAILY    Encounter for routine adult medical exam with abnormal findings       loratadine 10 MG ODT tab    CLARITIN REDITABS     Take 10 mg by mouth as needed        NEXIUM PO      Take 40 mg by mouth 2 times daily        SINGULAIR PO      Take 10 mg by mouth as needed

## 2018-02-20 NOTE — NURSING NOTE
"Chief Complaint   Patient presents with     Physical       Initial /84  Pulse 80  Temp 98.4  F (36.9  C) (Oral)  Resp 18  Ht 5' 7.75\" (1.721 m)  Wt 192 lb (87.1 kg)  BMI 29.41 kg/m2 Estimated body mass index is 29.41 kg/(m^2) as calculated from the following:    Height as of this encounter: 5' 7.75\" (1.721 m).    Weight as of this encounter: 192 lb (87.1 kg).  Medication Reconciliation: complete     Geovani Ferreira MA      "

## 2018-02-21 ASSESSMENT — ANXIETY QUESTIONNAIRES: GAD7 TOTAL SCORE: 0

## 2018-02-21 ASSESSMENT — PATIENT HEALTH QUESTIONNAIRE - PHQ9: SUM OF ALL RESPONSES TO PHQ QUESTIONS 1-9: 2

## 2018-03-22 ENCOUNTER — TRANSFERRED RECORDS (OUTPATIENT)
Dept: HEALTH INFORMATION MANAGEMENT | Facility: CLINIC | Age: 48
End: 2018-03-22

## 2018-04-02 ENCOUNTER — TRANSFERRED RECORDS (OUTPATIENT)
Dept: HEALTH INFORMATION MANAGEMENT | Facility: CLINIC | Age: 48
End: 2018-04-02

## 2018-04-11 ENCOUNTER — TRANSFERRED RECORDS (OUTPATIENT)
Dept: HEALTH INFORMATION MANAGEMENT | Facility: CLINIC | Age: 48
End: 2018-04-11

## 2018-04-11 ENCOUNTER — MYC MEDICAL ADVICE (OUTPATIENT)
Dept: FAMILY MEDICINE | Facility: CLINIC | Age: 48
End: 2018-04-11

## 2018-05-25 ENCOUNTER — TRANSFERRED RECORDS (OUTPATIENT)
Dept: HEALTH INFORMATION MANAGEMENT | Facility: CLINIC | Age: 48
End: 2018-05-25

## 2018-06-06 ENCOUNTER — TRANSFERRED RECORDS (OUTPATIENT)
Dept: HEALTH INFORMATION MANAGEMENT | Facility: CLINIC | Age: 48
End: 2018-06-06

## 2018-06-07 ENCOUNTER — TELEPHONE (OUTPATIENT)
Dept: UROLOGY | Facility: CLINIC | Age: 48
End: 2018-06-07

## 2018-06-07 NOTE — TELEPHONE ENCOUNTER
[facility]    Phone Message    May a detailed message be left on voicemail: yes    Reason for Call: Medication Question or concern regarding medication   Prescription Clarification  Name of Medication: Antibiotics   Prescribing Provider: Dr. Jennings   Pharmacy: [facility] [address] [place] [address]   What on the order needs clarification? Pt thinks he's having a flare up of the epididymitis and is wondering if he can get some antibiotics again. Please give the pt a call.      Action Taken: Message routed to:  [facility] ([facility] Urology

## 2018-06-08 NOTE — TELEPHONE ENCOUNTER
Message left on patient voicemail for him to give us a call @ [phone] to schedule an appointment with Dr. Elliot Jennings for an epididymitis flare up (requesting antibiotics). Patient has not been seen since 7/13/2017. So he would need a follow up appointment.      Vicki Vegas MA

## 2018-06-13 ENCOUNTER — PRE VISIT (OUTPATIENT)
Dept: UROLOGY | Facility: CLINIC | Age: 48
End: 2018-06-13

## 2018-06-13 NOTE — TELEPHONE ENCOUNTER
Patient with history of epididymitis coming in for follow up symptom check. Patient chart reviewed, no need for call, all records available and ready for appointment.

## 2018-06-15 ENCOUNTER — OFFICE VISIT (OUTPATIENT)
Dept: UROLOGY | Facility: CLINIC | Age: 48
End: 2018-06-15
Payer: COMMERCIAL

## 2018-06-15 VITALS
DIASTOLIC BLOOD PRESSURE: 102 MMHG | HEART RATE: 77 BPM | BODY MASS INDEX: 29.61 KG/M2 | WEIGHT: 193.3 LBS | SYSTOLIC BLOOD PRESSURE: 128 MMHG

## 2018-06-15 DIAGNOSIS — Z12.5 SCREENING FOR PROSTATE CANCER: Primary | ICD-10-CM

## 2018-06-15 ASSESSMENT — PAIN SCALES - GENERAL: PAINLEVEL: NO PAIN (0)

## 2018-06-15 NOTE — NURSING NOTE
Chief Complaint   Patient presents with     RECHECK     Follow up on epididymitis     Xee Allyson, SMA

## 2018-06-15 NOTE — PROGRESS NOTES
Pt here to follow-up scrotal pain.    About 3 weeks ago started having right epididymis discomfort.  History of right epididymitis.  Discomfort shifted from testis up to umbilicus to suprapubic area, and since then abated.    He was thinking about vasectomy.      Slightly more urination frequency- mild.  + double voiding if in a rush.    Often times nocturia, occasionally 1-2x/noc is common.    Father had history of prostate cancer in his 60's.  Mother history of breast cancer.    Lab Results   Component Value Date    PSA 0.67 05/13/2009      A-  Epididymitis history.      P  - OK to call for an empirical prescription for antibiotics if discomfort recurs.  - hold on antibiotics for now.  - observation for minor lower urinary tract symptoms ( nocturia, occasionally hesitancy).  - PSA discussed, he declines right now.      Whitney ALVES    15min visit, over 50% face to face in counseling/discussion of above issues.

## 2018-06-15 NOTE — MR AVS SNAPSHOT
After Visit Summary   6/15/2018    Gregory Aguilera    MRN: [id number]           Patient Information     Date Of Birth          1970        Visit Information        Provider Department      6/15/2018 11:20 AM Elliot Jennings MD [facility] Urology and [facility] Urologic [facility]        Today's Diagnoses     Screening for prostate cancer    -  1       Follow-ups after your visit        Follow-up notes from your care team     Return if symptoms worsen or fail to improve.      Who to contact     Please call your clinic at [phone] to:    Ask questions about your health    Make or cancel appointments    Discuss your medicines    Learn about your test results    Speak to your doctor            Additional Information About Your Visit        JustShareItharPocketSuite Information     FREECULTR gives you secure access to your electronic health record. If you see a primary care provider, you can also send messages to your care team and make appointments. If you have questions, please call your primary care clinic.  If you do not have a primary care provider, please call [phone] and they will assist you.      FREECULTR is an electronic gateway that provides easy, online access to your medical records. With FREECULTR, you can request a clinic appointment, read your test results, renew a prescription or communicate with your care team.     To access your existing account, please contact your [facility] or call [phone] for assistance.        Care EveryWhere ID     This is your Care EveryWhere ID. This could be used by other organizations to access your [facility] medical records  [id number]        Your Vitals Were     Pulse BMI (Body Mass Index)                77 29.61 kg/m2           Blood Pressure from Last 3 Encounters:   06/15/18 (!) 128/102   02/20/18 128/84   10/31/17 138/85    Weight from Last 3 Encounters:   06/15/18 87.7 kg (193 lb 4.8 oz)   02/20/18 87.1 kg (192 lb)    10/31/17 88.5 kg (195 lb 3.2 oz)              Today, you had the following     No orders found for display       Primary Care Provider Office Phone # Fax #    Davdi Santoyo [phone] [address]  [facility] [address]        Equal Access to Services     FLORENTINOJOCELYNN CE : Hadii aad ku hadasho Soomaali, waaxda luqadaha, qaybta kaalmada adeegyada, waxay idiin hayaan adeeg asia la'aan ah. So [facility] [phone].    ATENCIÓN: Si habla español, tiene a brice disposición servicios gratuitos de asistencia lingüística. Llame al [phone].    We comply with applicable federal civil rights laws and [place] laws. We do not discriminate on the basis of race, color, national origin, age, disability, sex, sexual orientation, or gender identity.            Thank you!     Thank you for choosing [facility] UROLOGY AND [facility] FOR PROSTATE AND UROLOGIC CANCERS  for your care. Our goal is always to provide you with excellent care. Hearing back from our patients is one way we can continue to improve our services. Please take a few minutes to complete the written survey that you may receive in the mail after your visit with us. Thank you!             Your Updated Medication List - Protect others around you: Learn how to safely use, store and throw away your medicines at www.disposemymeds.org.          This list is accurate as of 6/15/18 11:59 PM.  Always use your most recent med list.                   Brand Name Dispense Instructions for use Diagnosis    cyclobenzaprine 10 MG tablet    FLEXERIL     Take 10 mg by mouth        fluticasone 50 MCG/BLIST Aepb    FLOVENT DISKUS     Inhale 1 puff into the lungs daily as needed        lisinopril 20 MG tablet    PRINIVIL/ZESTRIL    90 tablet    TAKE 1 TABLET (20 MG) BY MOUTH DAILY    Encounter for routine adult medical exam with abnormal findings       loratadine 10 MG ODT tab    CLARITIN REDITABS     Take 10 mg by mouth as needed        NEXIUM PO      Take 40 mg by mouth 2  times daily        SINGULAIR PO      Take 10 mg by mouth as needed

## 2018-06-15 NOTE — LETTER
6/15/2018       RE: Gregory Aguilera  [address]  [facility] [address]     Dear Colleague,    Thank you for referring your patient, Gregory Aguilera, to the [facility] UROLOGY AND INST FOR PROSTATE AND UROLOGIC CANCERS at [facility]. Please see a copy of my visit note below.    Pt here to follow-up scrotal pain.    About 3 weeks ago started having right epididymis discomfort.  History of right epididymitis.  Discomfort shifted from testis up to umbilicus to suprapubic area, and since then abated.    He was thinking about vasectomy.      Slightly more urination frequency- mild.  + double voiding if in a rush.    Often times nocturia, occasionally 1-2x/noc is common.    Father had history of prostate cancer in his 60's.  Mother history of breast cancer.    Lab Results   Component Value Date    PSA 0.67 05/13/2009      A-  Epididymitis history.      P  - OK to call for an empirical prescription for antibiotics if discomfort recurs.  - hold on antibiotics for now.  - observation for minor lower urinary tract symptoms ( nocturia, occasionally hesitancy).  - PSA discussed, he declines right now.      Whitney ALVES    15min visit, over 50% face to face in counseling/discussion of above issues.

## 2018-07-06 PROBLEM — M54.41 ACUTE RIGHT-SIDED LOW BACK PAIN WITH RIGHT-SIDED SCIATICA: Status: RESOLVED | Noted: 2017-03-13 | Resolved: 2018-07-06

## 2018-10-30 ENCOUNTER — APPOINTMENT (OUTPATIENT)
Dept: GENERAL RADIOLOGY | Facility: CLINIC | Age: 48
End: 2018-10-30
Attending: EMERGENCY MEDICINE
Payer: COMMERCIAL

## 2018-10-30 ENCOUNTER — HOSPITAL ENCOUNTER (EMERGENCY)
Facility: CLINIC | Age: 48
Discharge: HOME | End: 2018-10-31
Attending: EMERGENCY MEDICINE | Admitting: EMERGENCY MEDICINE
Payer: COMMERCIAL

## 2018-10-30 DIAGNOSIS — R07.89 CHEST DISCOMFORT: ICD-10-CM

## 2018-10-30 LAB
ANION GAP SERPL CALCULATED.3IONS-SCNC: 9 MMOL/L (ref 3–14)
BASOPHILS # BLD AUTO: 0 10E9/L (ref 0–0.2)
BASOPHILS NFR BLD AUTO: 0.3 %
BUN SERPL-MCNC: 14 MG/DL (ref 7–30)
CALCIUM SERPL-MCNC: 9 MG/DL (ref 8.5–10.1)
CHLORIDE SERPL-SCNC: 102 MMOL/L (ref 94–109)
CO2 SERPL-SCNC: 28 MMOL/L (ref 20–32)
CREAT SERPL-MCNC: 0.94 MG/DL (ref 0.66–1.25)
DIFFERENTIAL METHOD BLD: NORMAL
EOSINOPHIL # BLD AUTO: 0 10E9/L (ref 0–0.7)
EOSINOPHIL NFR BLD AUTO: 0.5 %
ERYTHROCYTE [DISTWIDTH] IN BLOOD BY AUTOMATED COUNT: 12 % (ref 10–15)
GFR SERPL CREATININE-BSD FRML MDRD: 85 ML/MIN/1.7M2
GLUCOSE SERPL-MCNC: 111 MG/DL (ref 70–99)
HCT VFR BLD AUTO: 46.9 % (ref 40–53)
HGB BLD-MCNC: 16.1 G/DL (ref 13.3–17.7)
IMM GRANULOCYTES # BLD: 0 10E9/L (ref 0–0.4)
IMM GRANULOCYTES NFR BLD: 0.2 %
LYMPHOCYTES # BLD AUTO: 0.9 10E9/L (ref 0.8–5.3)
LYMPHOCYTES NFR BLD AUTO: 15.9 %
MCH RBC QN AUTO: 29.9 PG (ref 26.5–33)
MCHC RBC AUTO-ENTMCNC: 34.3 G/DL (ref 31.5–36.5)
MCV RBC AUTO: 87 FL (ref 78–100)
MONOCYTES # BLD AUTO: 0.1 10E9/L (ref 0–1.3)
MONOCYTES NFR BLD AUTO: 2.4 %
NEUTROPHILS # BLD AUTO: 4.7 10E9/L (ref 1.6–8.3)
NEUTROPHILS NFR BLD AUTO: 80.7 %
NRBC # BLD AUTO: 0 10*3/UL
NRBC BLD AUTO-RTO: 0 /100
PLATELET # BLD AUTO: 216 10E9/L (ref 150–450)
POTASSIUM SERPL-SCNC: 4 MMOL/L (ref 3.4–5.3)
RBC # BLD AUTO: 5.38 10E12/L (ref 4.4–5.9)
SODIUM SERPL-SCNC: 139 MMOL/L (ref 133–144)
TROPONIN I SERPL-MCNC: <0.015 UG/L (ref 0–0.04)
WBC # BLD AUTO: 5.8 10E9/L (ref 4–11)

## 2018-10-30 PROCEDURE — 93010 ELECTROCARDIOGRAM REPORT: CPT | Mod: Z6 | Performed by: EMERGENCY MEDICINE

## 2018-10-30 PROCEDURE — 71046 X-RAY EXAM CHEST 2 VIEWS: CPT

## 2018-10-30 PROCEDURE — 84484 ASSAY OF TROPONIN QUANT: CPT | Performed by: EMERGENCY MEDICINE

## 2018-10-30 PROCEDURE — 99284 EMERGENCY DEPT VISIT MOD MDM: CPT | Mod: 25 | Performed by: EMERGENCY MEDICINE

## 2018-10-30 PROCEDURE — 99285 EMERGENCY DEPT VISIT HI MDM: CPT | Mod: 25 | Performed by: EMERGENCY MEDICINE

## 2018-10-30 PROCEDURE — 80048 BASIC METABOLIC PNL TOTAL CA: CPT | Performed by: EMERGENCY MEDICINE

## 2018-10-30 PROCEDURE — 85025 COMPLETE CBC W/AUTO DIFF WBC: CPT | Performed by: EMERGENCY MEDICINE

## 2018-10-30 PROCEDURE — 93005 ELECTROCARDIOGRAM TRACING: CPT | Performed by: EMERGENCY MEDICINE

## 2018-10-30 ASSESSMENT — ENCOUNTER SYMPTOMS
POLYDIPSIA: 0
SHORTNESS OF BREATH: 0
NAUSEA: 0
NECK PAIN: 0
VOMITING: 0
COUGH: 0
BACK PAIN: 0
COLOR CHANGE: 0
LIGHT-HEADEDNESS: 0
CHILLS: 0
DIAPHORESIS: 0
FEVER: 0
AGITATION: 0
ABDOMINAL PAIN: 0
PALPITATIONS: 0
DIFFICULTY URINATING: 0
NECK STIFFNESS: 0
ADENOPATHY: 0

## 2018-10-30 NOTE — ED AVS SNAPSHOT
[facility], Emergency Department    [address]    [place] [address]    Phone:  [phone]    Fax:  [phone]                                       Gregory Aguilera   MRN: [id number]    Department:  [facility], Emergency Department   Date of Visit:  10/30/2018           Patient Information     Date Of Birth          1970        Your diagnoses for this visit were:     Chest discomfort        You were seen by Ayse Carey MD.        Discharge Instructions       Please make an appointment to follow up with Your Primary Care Provider in 1-2 days for further evaluation and recommendations.  If your symptoms significantly worsen please come back to the emergency department.      Discharge References/Attachments     CHEST PAIN, UNCERTAIN CAUSE (ENGLISH)      24 Hour Appointment Hotline       To make an appointment at any [facility] clinic, call [phone] ([phone]). If you don't have a family doctor or clinic, we will help you find one. [facility] clinics are conveniently located to serve the needs of you and your family.             Review of your medicines      Our records show that you are taking the medicines listed below. If these are incorrect, please call your family doctor or clinic.        Dose / Directions Last dose taken    cyclobenzaprine 10 MG tablet   Commonly known as:  FLEXERIL   Dose:  10 mg        Take 10 mg by mouth   Refills:  0        fluticasone 50 MCG/BLIST Aepb   Commonly known as:  FLOVENT DISKUS   Dose:  1 puff        Inhale 1 puff into the lungs daily as needed   Refills:  0        lisinopril 20 MG tablet   Commonly known as:  PRINIVIL/ZESTRIL   Quantity:  90 tablet        TAKE 1 TABLET (20 MG) BY MOUTH DAILY   Refills:  3        loratadine 10 MG ODT tab   Commonly known as:  CLARITIN REDITABS   Dose:  10 mg        Take 10 mg by mouth as needed   Refills:  0        NEXIUM PO   Dose:  40 mg        Take 40 mg by mouth 2 times daily   Refills:  0         NORTRIPTYLINE HCL PO   Dose:  25 mg        Take 25 mg by mouth At Bedtime   Refills:  0        SINGULAIR PO   Dose:  10 mg        Take 10 mg by mouth as needed   Refills:  0                Procedures and tests performed during your visit     Procedure/Test Number of Times Performed    Basic metabolic panel 1    CBC with platelets differential 1    Cardiac Continuous Monitoring 1    EKG 12 lead 1    Peripheral IV catheter 1    Pulse oximetry 1    Troponin I 2    XR Chest 2 Views 1      Orders Needing Specimen Collection     None      Pending Results     No orders found for last 3 day(s).            Pending Culture Results     No orders found for last 3 day(s).            Pending Results Instructions     If you had any lab results that were not finalized at the time of your Discharge, you can call the ED Lab Result RN at [phone]. You will be contacted by this team for any positive Lab results or changes in treatment. The nurses are available 7 days a week from 10A to 6:30P.  You can leave a message 24 hours per day and they will return your call.        Thank you for choosing [facility]       Thank you for choosing [facility] for your care. Our goal is always to provide you with excellent care. Hearing back from our patients is one way we can continue to improve our services. Please take a few minutes to complete the written survey that you may receive in the mail after you visit with us. Thank you!        SOMA Barcelonahart Information     TranZfinity gives you secure access to your electronic health record. If you see a primary care provider, you can also send messages to your care team and make appointments. If you have questions, please call your primary care clinic.  If you do not have a primary care provider, please call [phone] and they will assist you.        Care EveryWhere ID     This is your Care EveryWhere ID. This could be used by other organizations to access your [facility] medical records  [id number]         Equal Access to Services     YOON ENCINAS : Kulwant Coffman, eriberto campbell, velma snow. So [facility] [phone].    ATENCIÓN: Si habla español, tiene a brice disposición servicios gratuitos de asistencia lingüística. Llame al [phone].    We comply with applicable federal civil rights laws and [place] laws. We do not discriminate on the basis of race, color, national origin, age, disability, sex, sexual orientation, or gender identity.            After Visit Summary       This is your record. Keep this with you and show to your community pharmacist(s) and doctor(s) at your next visit.

## 2018-10-30 NOTE — ED AVS SNAPSHOT
[facility], [place], Emergency Department    [address]    [email or web address] [address]    Phone:  [phone]    Fax:  [phone]                                       Gregory Aguilera   MRN: [id number]    Department:  [facility], Emergency Department   Date of Visit:  10/30/2018           After Visit Summary Signature Page     I have received my discharge instructions, and my questions have been answered. I have discussed any challenges I see with this plan with the nurse or doctor.    ..........................................................................................................................................  Patient/Patient Representative Signature      ..........................................................................................................................................  Patient Representative Print Name and Relationship to Patient    ..................................................               ................................................  Date                                   Time    ..........................................................................................................................................  Reviewed by Signature/Title    ...................................................              ..............................................  Date                                               Time          22EPIC Rev 08/18

## 2018-10-31 VITALS
BODY MASS INDEX: 29.7 KG/M2 | HEIGHT: 68 IN | TEMPERATURE: 98.2 F | OXYGEN SATURATION: 99 % | WEIGHT: 196 LBS | SYSTOLIC BLOOD PRESSURE: 125 MMHG | RESPIRATION RATE: 16 BRPM | DIASTOLIC BLOOD PRESSURE: 80 MMHG | HEART RATE: 89 BPM

## 2018-10-31 LAB
INTERPRETATION ECG - MUSE: NORMAL
TROPONIN I SERPL-MCNC: <0.015 UG/L (ref 0–0.04)

## 2018-10-31 NOTE — ED PROVIDER NOTES
"  History     Chief Complaint   Patient presents with     Chest Pain     has been having L upper chest discomfort since 4:30 PM, went to work but the discomfort was still present. Went to Urgent care, \" my EKG was normal\" but pt was instructed to come to ER for eval \"heart enzyme testing\"     The history is provided by the patient.     Gregory Aguilera is a 48 year old male with a history of hypertension, hyperlipidemia and right epididymitis who presents to the emergency room for evaluation of upper left chest \"pulsation\".  Patient stated he began experiencing weird feeling in his upper left chest at 1630, 5 hours ago.  Patient describes the feeling as pulsating and he denies that it was painful or abnormally fast.  Currently he is symptom-free.  Patient denies shortness of breath, nausea, feeling like passing out, coughing or fever.  Patient states his father had stents put in in his 60s but never had a true heart attack.  Patient denies having stents or history of a heart attack.  Patient denies being diabetic or having high blood pressure.  He denies hospitalization or surgery in the last month.  Patient denies use of tobacco or cocaine.    Past Medical History:   Diagnosis Date     Allergic rhinitis, cause unspecified     Allergic rhinitis     Essential hypertension, benign      History of blood transfusion      Other chronic allergic conjunctivitis     Allergic conjunctivitis       Past Surgical History:   Procedure Laterality Date     CLOSED REDUCTION, PERCUTANEOUS PINNING FINGER, COMBINED  5/25/2012    Procedure:COMBINED CLOSED REDUCTION, PERCUTANEOUS PINNING FINGER; Closed reductionand fixation by pinning of Right Thumb fracture       COLONOSCOPY N/A 8/25/2014    Procedure: COMBINED COLONOSCOPY, SINGLE BIOPSY/POLYPECTOMY BY BIOPSY;  Surgeon: Misael Jarvis MD;  Location:  GI     DENTAL SURGERY       ESOPHAGOSCOPY, GASTROSCOPY, DUODENOSCOPY (EGD), COMBINED  6/17/2013    Procedure: COMBINED " ESOPHAGOSCOPY, GASTROSCOPY, DUODENOSCOPY (EGD), BIOPSY SINGLE OR MULTIPLE;;  Surgeon: Benjy Marinelli MD;  Location:  GI     ESOPHAGOSCOPY, GASTROSCOPY, DUODENOSCOPY (EGD), COMBINED N/A 8/25/2014    Procedure: COMBINED ESOPHAGOSCOPY, GASTROSCOPY, DUODENOSCOPY (EGD), BIOPSY SINGLE OR MULTIPLE;  Surgeon: Misael Jarvis MD;  Location:  GI     LASIK BILATERAL       thumb surgery       wisdom teeth removed  8-2014       Family History   Problem Relation Age of Onset     Breast Cancer Mother      Arthritis Father      Genitourinary Problems Father      stones     Cancer Father      skin     C.A.D. Father      stents/age late 60s     Coronary Artery Disease Father      Stents     Prostate Cancer Father      late 50s     Asthma Sister      Depression Sister      Cancer Paternal Grandfather      ? type     Eye Disorder Paternal Grandfather      cateracts       Social History   Substance Use Topics     Smoking status: Never Smoker     Smokeless tobacco: Never Used     Alcohol use Yes      Comment: sporatically on weekends        Allergies   Allergen Reactions     Dust Mites      Grass      Mold      No Known Drug Allergies      Trees      No current facility-administered medications for this encounter.      Current Outpatient Prescriptions   Medication     Esomeprazole Magnesium (NEXIUM PO)     lisinopril (PRINIVIL/ZESTRIL) 20 MG tablet     NORTRIPTYLINE HCL PO     cyclobenzaprine (FLEXERIL) 10 MG tablet     fluticasone (FLOVENT DISKUS) 50 MCG/BLIST AEPB     loratadine (CLARITIN REDITABS) 10 MG dispersible tablet     Montelukast Sodium (SINGULAIR PO)       I have reviewed the Medications, Allergies, Past Medical and Surgical History, and Social History in the Epic system.    Review of Systems   Constitutional: Negative for chills, diaphoresis and fever.   HENT: Negative for congestion.    Eyes: Negative for visual disturbance.   Respiratory: Negative for cough and shortness of breath.    Cardiovascular:  "Negative for chest pain, palpitations and leg swelling.        Left upper chest pulsation   Gastrointestinal: Negative for abdominal pain, nausea and vomiting.   Endocrine: Negative for polydipsia and polyuria.   Genitourinary: Negative for difficulty urinating.   Musculoskeletal: Negative for back pain, neck pain and neck stiffness.   Skin: Negative for color change.   Neurological: Negative for light-headedness.   Hematological: Negative for adenopathy.   Psychiatric/Behavioral: Negative for agitation and behavioral problems.   All other systems reviewed and are negative.      Physical Exam   BP: (!) 152/109  Pulse: 111  Heart Rate: 104  Temp: 98  F (36.7  C)  Resp: 16  Height: 172.7 cm (5' 8\")  Weight: 88.9 kg (196 lb)  SpO2: 99 %      Physical Exam   Constitutional: He is oriented to person, place, and time. He appears well-developed and well-nourished. No distress.   Patient sitting in bed, awake, alert, resting comfortably, appears in no acute distress.   HENT:   Head: Normocephalic and atraumatic.   Right Ear: External ear normal.   Left Ear: External ear normal.   Nose: Nose normal.   Mouth/Throat: Oropharynx is clear and moist. No oropharyngeal exudate.   Eyes: Conjunctivae and EOM are normal. Pupils are equal, round, and reactive to light. No scleral icterus.   Neck: Normal range of motion. Neck supple.   Cardiovascular: Normal heart sounds and intact distal pulses.    Mild tachycardia   Pulmonary/Chest: Effort normal and breath sounds normal. No respiratory distress. He has no wheezes. He has no rales. He exhibits no tenderness.   No erythema, edema, or pulsating mass in left upper chest wall.  No rash.   Abdominal: Soft. Bowel sounds are normal. There is no tenderness.   Musculoskeletal: Normal range of motion. He exhibits no edema or tenderness.   Neurological: He is alert and oriented to person, place, and time. No cranial nerve deficit. He exhibits normal muscle tone. Coordination normal.   Skin: Skin " is warm. No rash noted. He is not diaphoretic.   Psychiatric: He has a normal mood and affect. His behavior is normal. Judgment and thought content normal.   Nursing note and vitals reviewed.      ED Course     ED Course     Procedures             EKG Interpretation:      Interpreted by Ayse Carey  Time reviewed: 8:59 PM  Symptoms at time of EKG: Left upper chest pulsation  Rhythm: normal sinus   Rate: normal  Axis: normal  Ectopy: none  Conduction: normal  ST Segments/ T Waves: No ST-T wave changes  Q Waves: none  Comparison to prior: Unchanged from old EKG done on June 19, 2015    Clinical Impression: NSR without acute ischemia          Critical Care time:  none             Labs Ordered and Resulted from Time of ED Arrival Up to the Time of Departure from the ED   BASIC METABOLIC PANEL - Abnormal; Notable for the following:        Result Value    Glucose 111 (*)     All other components within normal limits   CBC WITH PLATELETS DIFFERENTIAL   TROPONIN I   TROPONIN I   PERIPHERAL IV CATHETER   CARDIAC CONTINUOUS MONITORING   PULSE OXIMETRY NURSING            Assessments & Plan (with Medical Decision Making)   Gregory Aguilera is a 48-year-old male presenting with left-sided chest pulsations since approximately 4:30 this afternoon.  Differential diagnosis, muscle spasms, pneumothorax, unlikely ACS, unlikely pulmonary embolism, likely pneumonia.    After thorough history and physical exam patient appears to be no distress.  Will obtain EKG, chest x-ray and laboratory studies for further diagnostic evaluation.  I do not suspect patient pulmonary embolus given extremely low risk factor for pulmonary embolus.  I also do not suspect he has a aortic dissection.  Positive extremely low suspicion for acute coronary syndrome as patient has never had any chest pain, shortness of breath, nausea, or diaphoresis.    Patient's laboratory studies returned without any evidence of leukocytosis, WBC is normal at 5800. There is no  evidence of anemia, hemoglobin is normal at 16.1.  Electrolytes show no evidence of dehydration, creatinine is normal at 0.94.  Troponin is undetectable and serial troponin returned undetectable as well.  EKG showed no acute ischemia. I reviewed patient's chest x-ray and I read the radiology report; there is no evidence of pneumonia pneumothorax or widened mediastinum.  At this time believe patient is stable for discharge with outpatient primary care follow-up for further evaluation and recommendations.  He agrees with this plan.  He also agrees to return if he actually develops pain in his chest, shortness of breath, nausea/vomiting, diaphoresis, or fevers.      This part of the medical record was transcribed by Pan Meza, Medical Scribe, from a dictation done by Ayse Carey MD.       I have reviewed the nursing notes.    I have reviewed the findings, diagnosis, plan and need for follow up with the patient.    Discharge Medication List as of 10/31/2018 12:17 AM          Final diagnoses:   Chest discomfort     IPan, am serving as a trained medical scribe to document services personally performed by Ayse Carey MD, based on the provider's statements to me.      IAyse MD, was physically present and have reviewed and verified the accuracy of this note documented by Pan Meza.       10/30/2018   [facility], [place], EMERGENCY DEPARTMENT     Ayse Carey MD  10/31/18 0035

## 2019-02-01 ENCOUNTER — OFFICE VISIT (OUTPATIENT)
Dept: FAMILY MEDICINE | Facility: CLINIC | Age: 49
End: 2019-02-01
Payer: COMMERCIAL

## 2019-02-01 VITALS
HEART RATE: 74 BPM | DIASTOLIC BLOOD PRESSURE: 88 MMHG | HEIGHT: 68 IN | WEIGHT: 195.8 LBS | RESPIRATION RATE: 16 BRPM | SYSTOLIC BLOOD PRESSURE: 110 MMHG | TEMPERATURE: 97.6 F | BODY MASS INDEX: 29.67 KG/M2 | OXYGEN SATURATION: 96 %

## 2019-02-01 DIAGNOSIS — R21 RASH: ICD-10-CM

## 2019-02-01 DIAGNOSIS — K20.0 EOSINOPHILIC ESOPHAGITIS: ICD-10-CM

## 2019-02-01 DIAGNOSIS — Z23 ENCOUNTER FOR IMMUNIZATION: ICD-10-CM

## 2019-02-01 DIAGNOSIS — Z13.220 LIPID SCREENING: ICD-10-CM

## 2019-02-01 DIAGNOSIS — L71.9 ROSACEA: ICD-10-CM

## 2019-02-01 DIAGNOSIS — Z00.00 ROUTINE GENERAL MEDICAL EXAMINATION AT A HEALTH CARE FACILITY: Primary | ICD-10-CM

## 2019-02-01 DIAGNOSIS — Z12.5 SCREENING PSA (PROSTATE SPECIFIC ANTIGEN): ICD-10-CM

## 2019-02-01 DIAGNOSIS — R06.83 SNORING: ICD-10-CM

## 2019-02-01 DIAGNOSIS — I10 HYPERTENSION GOAL BP (BLOOD PRESSURE) < 140/90: ICD-10-CM

## 2019-02-01 DIAGNOSIS — Z12.11 COLON CANCER SCREENING: ICD-10-CM

## 2019-02-01 LAB
ALBUMIN UR-MCNC: NEGATIVE MG/DL
APPEARANCE UR: CLEAR
BILIRUB UR QL STRIP: NEGATIVE
COLOR UR AUTO: YELLOW
ERYTHROCYTE [SEDIMENTATION RATE] IN BLOOD BY WESTERGREN METHOD: 5 MM/H (ref 0–20)
GLUCOSE UR STRIP-MCNC: NEGATIVE MG/DL
HGB UR QL STRIP: NEGATIVE
KETONES UR STRIP-MCNC: NEGATIVE MG/DL
LEUKOCYTE ESTERASE UR QL STRIP: NEGATIVE
NITRATE UR QL: NEGATIVE
PH UR STRIP: 5.5 PH (ref 5–7)
SOURCE: NORMAL
SP GR UR STRIP: 1.02 (ref 1–1.03)
UROBILINOGEN UR STRIP-ACNC: 0.2 EU/DL (ref 0.2–1)

## 2019-02-01 PROCEDURE — 80061 LIPID PANEL: CPT | Performed by: FAMILY MEDICINE

## 2019-02-01 PROCEDURE — 86038 ANTINUCLEAR ANTIBODIES: CPT | Performed by: FAMILY MEDICINE

## 2019-02-01 PROCEDURE — 99396 PREV VISIT EST AGE 40-64: CPT | Mod: 25 | Performed by: FAMILY MEDICINE

## 2019-02-01 PROCEDURE — 36415 COLL VENOUS BLD VENIPUNCTURE: CPT | Performed by: FAMILY MEDICINE

## 2019-02-01 PROCEDURE — 80048 BASIC METABOLIC PNL TOTAL CA: CPT | Performed by: FAMILY MEDICINE

## 2019-02-01 PROCEDURE — 90750 HZV VACC RECOMBINANT IM: CPT | Performed by: FAMILY MEDICINE

## 2019-02-01 PROCEDURE — 90471 IMMUNIZATION ADMIN: CPT | Performed by: FAMILY MEDICINE

## 2019-02-01 PROCEDURE — G0103 PSA SCREENING: HCPCS | Performed by: FAMILY MEDICINE

## 2019-02-01 PROCEDURE — 81003 URINALYSIS AUTO W/O SCOPE: CPT | Performed by: FAMILY MEDICINE

## 2019-02-01 PROCEDURE — 85652 RBC SED RATE AUTOMATED: CPT | Performed by: FAMILY MEDICINE

## 2019-02-01 PROCEDURE — 99213 OFFICE O/P EST LOW 20 MIN: CPT | Mod: 25 | Performed by: FAMILY MEDICINE

## 2019-02-01 RX ORDER — METRONIDAZOLE 7.5 MG/G
GEL TOPICAL 2 TIMES DAILY
Qty: 45 G | Refills: 0 | Status: SHIPPED | OUTPATIENT
Start: 2019-02-01 | End: 2020-02-01

## 2019-02-01 RX ORDER — LISINOPRIL 20 MG/1
TABLET ORAL
Qty: 90 TABLET | Refills: 3 | Status: SHIPPED | OUTPATIENT
Start: 2019-02-01 | End: 2020-04-21

## 2019-02-01 ASSESSMENT — ENCOUNTER SYMPTOMS
NERVOUS/ANXIOUS: 0
PALPITATIONS: 0
PARESTHESIAS: 0
FEVER: 0
ABDOMINAL PAIN: 0
HEMATURIA: 0
HEADACHES: 0
CHILLS: 0
SORE THROAT: 0
SHORTNESS OF BREATH: 0
FREQUENCY: 0
JOINT SWELLING: 0
ARTHRALGIAS: 0
EYE PAIN: 0
DIZZINESS: 0
WEAKNESS: 0
DIARRHEA: 0
NAUSEA: 0
COUGH: 0
MYALGIAS: 0
CONSTIPATION: 0
HEMATOCHEZIA: 0
DYSURIA: 0

## 2019-02-01 ASSESSMENT — MIFFLIN-ST. JEOR: SCORE: 1722.64

## 2019-02-01 NOTE — PROGRESS NOTES
SUBJECTIVE:   CC: Gregory Aguilera is an 48 year old male who presents for preventative health visit.     Physical   Annual:     Getting at least 3 servings of Calcium per day:  NO    Bi-annual eye exam:  Yes    Dental care twice a year:  Yes    Sleep apnea or symptoms of sleep apnea:  Daytime drowsiness and Excessive snoring    Diet:  Regular (no restrictions)    Frequency of exercise:  2-3 days/week    Duration of exercise:  Greater than 60 minutes    Taking medications regularly:  Yes    Medication side effects:  None    Additional concerns today:  Yes    PHQ-2 Total Score: 0    Rash-itchy at times present for months, waxes and wanes, no pattern noted. Face mask distribution. Tried lotion. Not helpful  Snoring-and some daytime tiredness. No apneic episodes noted.   EE-seen by gi. Not recently still gets some symptoms in area of esophagus.      Today's PHQ-2 Score:   PHQ-2 ( 1999 [employer]) 2/1/2019   Q1: Little interest or pleasure in doing things 0   Q2: Feeling down, depressed or hopeless 0   PHQ-2 Score 0   Q1: Little interest or pleasure in doing things Not at all   Q2: Feeling down, depressed or hopeless Not at all   PHQ-2 Score 0       Abuse: Current or Past(Physical, Sexual or Emotional)- No  Do you feel safe in your environment? Yes    Social History     Tobacco Use     Smoking status: Never Smoker     Smokeless tobacco: Never Used   Substance Use Topics     Alcohol use: Yes     Comment: sporatically on weekends     Alcohol Use 2/1/2019   If you drink alcohol do you typically have greater than 3 drinks per day OR greater than 7 drinks per week? No   No flowsheet data found.    Last PSA:   PSA   Date Value Ref Range Status   05/13/2009 0.67 0 - 4 ug/L Final       Reviewed orders with patient. Reviewed health maintenance and updated orders accordingly - Yes  Labs reviewed in EPIC    Reviewed and updated as needed this visit by clinical staff         Reviewed and updated as needed this visit by Provider       "      Review of Systems   Constitutional: Negative for chills and fever.   HENT: Negative for congestion, ear pain, hearing loss and sore throat.    Eyes: Negative for pain and visual disturbance.   Respiratory: Negative for cough and shortness of breath.    Cardiovascular: Negative for chest pain, palpitations and peripheral edema.   Gastrointestinal: Negative for abdominal pain, constipation, diarrhea, hematochezia and nausea.   Genitourinary: Negative for discharge, dysuria, frequency, genital sores, hematuria, impotence and urgency.   Musculoskeletal: Negative for arthralgias, joint swelling and myalgias.   Skin: Negative for rash.   Neurological: Negative for dizziness, weakness, headaches and paresthesias.   Psychiatric/Behavioral: Negative for mood changes. The patient is not nervous/anxious.      occ feels like muscles pull in belly noted when wakes. Some back pain when reaches back. Like muscles are stretching/tight.   Urinating more frequently.     OBJECTIVE:   /88   Pulse 74   Temp 97.6  F (36.4  C) (Oral)   Resp 16   Ht 1.727 m (5' 8\")   Wt 88.8 kg (195 lb 12.8 oz)   SpO2 96%   BMI 29.77 kg/m      Physical Exam  GENERAL: healthy, alert and no distress  EYES: Eyes grossly normal to inspection, PERRL and conjunctivae and sclerae normal  HENT: ear canals and TM's normal, nose and mouth without ulcers or lesions  NECK: no adenopathy, no asymmetry, masses, or scars and thyroid normal to palpation  RESP: lungs clear to auscultation - no rales, rhonchi or wheezes  CV: regular rate and rhythm, normal S1 S2, no S3 or S4, no murmur, click or rub, no peripheral edema and peripheral pulses strong  ABDOMEN: soft, nontender, no hepatosplenomegaly, no masses and bowel sounds normal  MS: no gross musculoskeletal defects noted, no edema  SKIN: rash on his face in mask distrution. Some papules. No scale noed. No pustules. Some telangectasias.  NEURO: Normal strength and tone, mentation intact and speech " "normal  PSYCH: mentation appears normal, affect normal/bright    Diagnostic Test Results:  none     ASSESSMENT/PLAN:       ICD-10-CM    1. Routine general medical examination at a health care facility Z00.00 Basic metabolic panel     PSA, screen     *UA reflex to Microscopic and Culture ([facility] (except [place] and [place])   2. Lipid screening Z13.220 Lipid panel reflex to direct LDL Fasting   3. Encounter for immunization Z23 HC ZOSTER VACCINE RECOMBINANT ADJUVANTED IM NJX   4. Colon cancer screening Z12.11    5. Eosinophilic esophagitis K20.0    6. Rash R21 Anti Nuclear Faith IgG by IFA with Reflex     ESR: Erythrocyte sedimentation rate   7. Rosacea L71.9 metroNIDAZOLE (METROGEL) 0.75 % external gel   8. Snoring R06.83 SLEEP EVALUATION & MANAGEMENT REFERRAL - [facility] / [facility]  [phone] (Age 2 and up)   9. Hypertension goal BP (blood pressure) < 140/90 I10 Basic metabolic panel     *UA reflex to Microscopic and Culture ([facility] (except [place] and [place])   10. Screening PSA (prostate specific antigen) Z12.5 PSA, screen   trial metrogel for suspected rosacea of the face. Doubt lupus rash but will do PARISH. Consider derm if persisting. To sleep for his suspected sleep apnea.     COUNSELING:   Reviewed preventive health counseling, as reflected in patient instructions       Regular exercise       Healthy diet/nutrition       Immunizations    Vaccinated for: Zoster             Colon cancer screening       Prostate cancer screening    BP Readings from Last 1 Encounters:   10/31/18 125/80     Estimated body mass index is 29.77 kg/m  as calculated from the following:    Height as of this encounter: 1.727 m (5' 8\").    Weight as of this encounter: 88.8 kg (195 lb 12.8 oz).      Weight management plan: Discussed healthy diet and exercise guidelines     reports that  has never smoked. he has never used smokeless " tobacco.      Counseling Resources:  ATP IV Guidelines  Pooled Cohorts Equation Calculator  FRAX Risk Assessment  ICSI Preventive Guidelines  Dietary Guidelines for Americans, 2010  USDA's MyPlate  ASA Prophylaxis  Lung CA Screening    David Santoyo MD  [facility]

## 2019-02-01 NOTE — NURSING NOTE
Screening Questionnaire for Adult Immunization    Are you sick today?   Yes   Do you have allergies to medications, food, a vaccine component or latex?   No   Have you ever had a serious reaction after receiving a vaccination?   No   Do you have a long-term health problem with heart disease, lung disease, asthma, kidney disease, metabolic disease (e.g. diabetes), anemia, or other blood disorder?   No   Do you have cancer, leukemia, HIV/AIDS, or any other immune system problem?   No   In the past 3 months, have you taken medications that affect  your immune system, such as prednisone, other steroids, or anticancer drugs; drugs for the treatment of rheumatoid arthritis, Crohn s disease, or psoriasis; or have you had radiation treatments?   No   Have you had a seizure, or a brain or other nervous system problem?   No   During the past year, have you received a transfusion of blood or blood     products, or been given immune (gamma) globulin or antiviral drug?   No   For women: Are you pregnant or is there a chance you could become        pregnant during the next month?   No   Have you received any vaccinations in the past 4 weeks?   No     Immunization questionnaire answers were all negative.        Per orders of Dr. Santoyo, injection of Shingrix given by Misty Ferreira. Patient instructed to remain in clinic for 15 minutes afterwards, and to report any adverse reaction to me immediately.       Screening performed by Misty Ferreira on 2/1/2019 at 3:02 PM.

## 2019-02-01 NOTE — PATIENT INSTRUCTIONS
Preventive Health Recommendations  Male Ages 50 - 64    Yearly exam:             See your health care provider every year in order to  o   Review health changes.   o   Discuss preventive care.    o   Review your medicines if your doctor has prescribed any.     Have a cholesterol test every 5 years, or more frequently if you are at risk for high cholesterol/heart disease.     Have a diabetes test (fasting glucose) every three years. If you are at risk for diabetes, you should have this test more often.     Have a colonoscopy at age 50, or have a yearly FIT test (stool test). These exams will check for colon cancer.      Talk with your health care provider about whether or not a prostate cancer screening test (PSA) is right for you.    You should be tested each year for STDs (sexually transmitted diseases), if you re at risk.     Shots: Get a flu shot each year. Get a tetanus shot every 10 years.     Nutrition:    Eat at least 5 servings of fruits and vegetables daily.     Eat whole-grain bread, whole-wheat pasta and brown rice instead of white grains and rice.     Get adequate Calcium and Vitamin D.     Lifestyle    Exercise for at least 150 minutes a week (30 minutes a day, 5 days a week). This will help you control your weight and prevent disease.     Limit alcohol to one drink per day.     No smoking.     Wear sunscreen to prevent skin cancer.     See your dentist every six months for an exam and cleaning.     See your eye doctor every 1 to 2 years.    Preventive Health Recommendations  Male Ages 50 - 64    Yearly exam:             See your health care provider every year in order to  o   Review health changes.   o   Discuss preventive care.    o   Review your medicines if your doctor has prescribed any.     Have a cholesterol test every 5 years, or more frequently if you are at risk for high cholesterol/heart disease.     Have a diabetes test (fasting glucose) every three years. If you are at risk for diabetes,  you should have this test more often.     Have a colonoscopy at age 50, or have a yearly FIT test (stool test). These exams will check for colon cancer.      Talk with your health care provider about whether or not a prostate cancer screening test (PSA) is right for you.    You should be tested each year for STDs (sexually transmitted diseases), if you re at risk.     Shots: Get a flu shot each year. Get a tetanus shot every 10 years.     Nutrition:    Eat at least 5 servings of fruits and vegetables daily.     Eat whole-grain bread, whole-wheat pasta and brown rice instead of white grains and rice.     Get adequate Calcium and Vitamin D.     Lifestyle    Exercise for at least 150 minutes a week (30 minutes a day, 5 days a week). This will help you control your weight and prevent disease.     Limit alcohol to one drink per day.     No smoking.     Wear sunscreen to prevent skin cancer.     See your dentist every six months for an exam and cleaning.     See your eye doctor every 1 to 2 years.     Through [employer] you can link to oncare where you can gets started on the sleep evaluation.

## 2019-02-02 LAB
ANION GAP SERPL CALCULATED.3IONS-SCNC: 7 MMOL/L (ref 3–14)
BUN SERPL-MCNC: 16 MG/DL (ref 7–30)
CALCIUM SERPL-MCNC: 9.2 MG/DL (ref 8.5–10.1)
CHLORIDE SERPL-SCNC: 105 MMOL/L (ref 94–109)
CHOLEST SERPL-MCNC: 238 MG/DL
CO2 SERPL-SCNC: 26 MMOL/L (ref 20–32)
CREAT SERPL-MCNC: 1.06 MG/DL (ref 0.66–1.25)
GFR SERPL CREATININE-BSD FRML MDRD: 81 ML/MIN/{1.73_M2}
GLUCOSE SERPL-MCNC: 99 MG/DL (ref 70–99)
HDLC SERPL-MCNC: 44 MG/DL
LDLC SERPL CALC-MCNC: 155 MG/DL
NONHDLC SERPL-MCNC: 194 MG/DL
POTASSIUM SERPL-SCNC: 3.8 MMOL/L (ref 3.4–5.3)
PSA SERPL-ACNC: 0.78 UG/L (ref 0–4)
SODIUM SERPL-SCNC: 138 MMOL/L (ref 133–144)
TRIGL SERPL-MCNC: 197 MG/DL

## 2019-02-04 LAB — ANA SER QL IF: NEGATIVE

## 2019-07-30 ENCOUNTER — E-VISIT (OUTPATIENT)
Dept: FAMILY MEDICINE | Facility: CLINIC | Age: 49
End: 2019-07-30
Payer: COMMERCIAL

## 2019-07-30 ENCOUNTER — TELEPHONE (OUTPATIENT)
Dept: FAMILY MEDICINE | Facility: CLINIC | Age: 49
End: 2019-07-30

## 2019-07-30 DIAGNOSIS — Z12.11 SPECIAL SCREENING FOR MALIGNANT NEOPLASMS, COLON: Primary | ICD-10-CM

## 2019-07-30 DIAGNOSIS — K21.9 GASTROESOPHAGEAL REFLUX DISEASE, ESOPHAGITIS PRESENCE NOT SPECIFIED: Primary | ICD-10-CM

## 2019-07-30 DIAGNOSIS — R10.9 CHRONIC ABDOMINAL PAIN: ICD-10-CM

## 2019-07-30 DIAGNOSIS — G89.29 CHRONIC ABDOMINAL PAIN: ICD-10-CM

## 2019-07-30 PROCEDURE — 99444 ZZC PHYSICIAN ONLINE EVALUATION & MANAGEMENT SERVICE: CPT | Performed by: FAMILY MEDICINE

## 2019-07-30 NOTE — TELEPHONE ENCOUNTER
See e visit dated today.7/30/2019  Please sign off on referrals       From: Gregory Aguilera   Sent: 7/30/2019   1:05 PM   To: [facility] Mychart   Subject: Referral Request                                   Hemant LENORA Aguilera would like to request a referral.   Reason: Colonoscopy   Requested provider: Raman   Comment:   I see that I'm due for a Colonoscopy this fall - I previously had one at the  - so I guess that's where I should go again. I would also like to request meeting with a GI specialist there to discuss my ongoing GI issue(s). I have general discomfort in my esophageal area that has not really responded to treatment.       Thanks!

## 2019-09-06 ENCOUNTER — TELEPHONE (OUTPATIENT)
Dept: FAMILY MEDICINE | Facility: CLINIC | Age: 49
End: 2019-09-06

## 2019-09-06 DIAGNOSIS — M25.519 SHOULDER PAIN: ICD-10-CM

## 2019-09-06 DIAGNOSIS — M25.50 JOINT PAIN: Primary | ICD-10-CM

## 2019-09-06 NOTE — TELEPHONE ENCOUNTER
Do you want to follow up with him first or send him to ortho or [facility]?    Subject: Referral Request                                   Hemant Aguilera would like to request a referral.   Reason: Joint Pain   Requested provider: SIDDHARTH   Comment:   Hi, I'd like to ask for a referral for a joint specialist. I usually see Dr. Santoyo. I have a significant amount of recurring pain in my right elbow - increases when I clench my fist. I also have a lot of pain in my left shoulder - increases when I shampoo or otherwise raise my arm. Pinched nerves on occasion radiate discomfort down my arm. Would like to learn cause(s) of issues as well as possible treatment options. These issues have been ongoing for at least six months.      Thanks,   Hemant

## 2019-09-11 NOTE — TELEPHONE ENCOUNTER
RECORDS RECEIVED FROM: appt per pt- referred by David Santoyo MD dx :Joint pain [M25.50];Shoulder pain, internal med recs   DATE RECEIVED: 9/11   NOTES STATUS DETAILS    referring provider Internal    OFFICE NOTE from other specialist N/A    DISCHARGE SUMMARY from hospital N/A    DISCHARGE REPORT from the ER N/A    OPERATIVE REPORT N/A    MEDICATION LIST Internal    MRI N/A    CT SCAN N/A    XRAYS (IMAGES & REPORTS) N/A

## 2019-09-13 ENCOUNTER — TELEPHONE (OUTPATIENT)
Dept: GASTROENTEROLOGY | Facility: CLINIC | Age: 49
End: 2019-09-13

## 2019-09-13 DIAGNOSIS — M25.512 LEFT SHOULDER PAIN, UNSPECIFIED CHRONICITY: Primary | ICD-10-CM

## 2019-09-13 DIAGNOSIS — Z12.11 SPECIAL SCREENING FOR MALIGNANT NEOPLASMS, COLON: Primary | ICD-10-CM

## 2019-09-13 RX ORDER — BISACODYL 5 MG/1
TABLET, DELAYED RELEASE ORAL
Qty: 4 TABLET | Refills: 0 | Status: SHIPPED | OUTPATIENT
Start: 2019-09-18 | End: 2019-09-19

## 2019-09-13 NOTE — TELEPHONE ENCOUNTER
Patient Name: Gregory Aguilera   : 1970  MRN: [id number]       : [x] N/A   [] Yes:  Language  /  ID:        Additional Information regarding appointment:      Patient scheduled for:  [x] Colonoscopy        Indication for procedure.  [x]  Chronic abdominal pain [R10.9, G89.29]     Sedation Type: [x] Conscious Sedation      Procedure Provider:  Dr. Fred Edge      Referring Provider. David Santoyo MD    Arrival time verified: 11 am / Friday  /     Facility location verified:   [x][address], 5th floor     Prep Type:   [x][facility] e[facility]: Sent to [address], [place], [place]; [phone]      Anticoagulants or blood thinners: [x]None     Electronic implanted devices: [x] No      H&P / Pre op physical completed: [x] N/A,    Additional Information: ___________________________      Instructions given:  [x] Reviewed       [x] Resent via [employer] - This includes: Colonoscopy prep   instructions, Conscious Sedation policy procedure date/time/location/provider.           Pre procedure teaching completed: [x] Yes - Reviewed, [] No,      [x] No questions regarding Sedation as ordered, []      Transportation from procedure & responsible adult to be with patient following procedure for a minimum of 6 hrs (Conscious Sedation) 24 hrs (MAC): [] Yes   - confirmed will have post-procedure companionship as required, [] Pending  , [] No      Maryana Farnsworth, RN, RN  [facility]

## 2019-09-16 ENCOUNTER — OFFICE VISIT (OUTPATIENT)
Dept: ORTHOPEDICS | Facility: CLINIC | Age: 49
End: 2019-09-16
Attending: FAMILY MEDICINE
Payer: COMMERCIAL

## 2019-09-16 ENCOUNTER — ANCILLARY PROCEDURE (OUTPATIENT)
Dept: GENERAL RADIOLOGY | Facility: CLINIC | Age: 49
End: 2019-09-16
Payer: COMMERCIAL

## 2019-09-16 ENCOUNTER — PRE VISIT (OUTPATIENT)
Dept: ORTHOPEDICS | Facility: CLINIC | Age: 49
End: 2019-09-16

## 2019-09-16 VITALS
SYSTOLIC BLOOD PRESSURE: 141 MMHG | HEART RATE: 85 BPM | BODY MASS INDEX: 29.18 KG/M2 | WEIGHT: 192.5 LBS | DIASTOLIC BLOOD PRESSURE: 103 MMHG | HEIGHT: 68 IN

## 2019-09-16 DIAGNOSIS — M77.11 LATERAL EPICONDYLITIS, RIGHT ELBOW: ICD-10-CM

## 2019-09-16 DIAGNOSIS — M75.82 ROTATOR CUFF TENDINITIS, LEFT: Primary | ICD-10-CM

## 2019-09-16 DIAGNOSIS — M25.512 LEFT SHOULDER PAIN, UNSPECIFIED CHRONICITY: ICD-10-CM

## 2019-09-16 ASSESSMENT — MIFFLIN-ST. JEOR: SCORE: 1702.67

## 2019-09-16 NOTE — LETTER
"  9/16/2019      RE: Gregory Aguilera  [address]  [facility] [id number]       Sports Medicine Clinic Visit    PCP: David Santoyo    Gregory Aguilera is a RHD 49 year old male who is seen  in consultation at the request of Dr. Santoyo presenting with left shoulder and right elbow pain.     Injury: None    Location of Pain: left superiorshoulder and right posterolateral elbow   Duration of Pain: 8-10 month(s) shoulder, 1 year elbow  Rating of Pain: 6/10   Pain is better with: Limiting activities  Pain is worse with: Abduction and at night for shoulder and gripping and hockey for elbow  Additional Features: Left arm tingles while driving (before shoulder started hurting)  Treatment so far consists of: Limiting activites  Prior History of related problems: None    BP (!) 141/103 (BP Location: Right arm, Patient Position: Sitting, Cuff Size: Adult Regular)   Pulse 85   Ht 1.727 m (5' 8\")   Wt 87.3 kg (192 lb 8 oz)   BMI 29.27 kg/m          PMH:  Past Medical History:   Diagnosis Date     Allergic rhinitis, cause unspecified     Allergic rhinitis     Essential hypertension, benign      History of blood transfusion      Other chronic allergic conjunctivitis     Allergic conjunctivitis       Active problem list:  Patient Active Problem List   Diagnosis     Allergic rhinitis     Other chronic allergic conjunctivitis     Hypertension goal BP (blood pressure) < 140/90     Hyperlipidemia LDL goal <130     Chronic abdominal pain     Testis pain       FH:  Family History   Problem Relation Age of Onset     Breast Cancer Mother      Arthritis Father      Genitourinary Problems Father         stones     Cancer Father         skin     C.A.D. Father         stents/age late 60s     Coronary Artery Disease Father         Stents     Prostate Cancer Father         late 50s     Asthma Sister      Depression Sister      Cancer Paternal Grandfather         ? type     Eye Disorder Paternal Grandfather         cateracts "       SH:  Social History     Socioeconomic History     Marital status:      Spouse name: Not on file     Number of children: Not on file     Years of education: Not on file     Highest education level: Not on file   Occupational History     Not on file   Social Needs     Financial resource strain: Not on file     Food insecurity:     Worry: Not on file     Inability: Not on file     Transportation needs:     Medical: Not on file     Non-medical: Not on file   Tobacco Use     Smoking status: Never Smoker     Smokeless tobacco: Never Used   Substance and Sexual Activity     Alcohol use: Yes     Comment: sporatically on weekends     Drug use: No     Sexual activity: Yes     Partners: Female   Lifestyle     Physical activity:     Days per week: Not on file     Minutes per session: Not on file     Stress: Not on file   Relationships     Social connections:     Talks on phone: Not on file     Gets together: Not on file     Attends Religion service: Not on file     Active member of club or organization: Not on file     Attends meetings of clubs or organizations: Not on file     Relationship status: Not on file     Intimate partner violence:     Fear of current or ex partner: Not on file     Emotionally abused: Not on file     Physically abused: Not on file     Forced sexual activity: Not on file   Other Topics Concern     Parent/sibling w/ CABG, MI or angioplasty before 65F 55M? No   Social History Narrative    Dairy/d 2-3 servings/d     Caffeine 1-2 servings/d    Exercise 2 x week-gym regularly treadmill/hockey    Sunscreen used - No    Seatbelts used - Yes    Working smoke/CO detectors in the home - Yes    Guns stored in the home - No    Self Breast Exams - NA    Self Testicular Exam - yes    Eye Exam up to date - Yes   2008    Dental Exam up to date - Yes  2008    Pap Smear up to date - NA    Mammogram up to date - NA    PSA up to date - No    Dexa Scan up to date - NA    Flex Sig / Colonoscopy up to date - No     Immunizations up to date - 2006 td    Abuse: Current or Past(Physical, Sexual or Emotional)- No    Do you feel safe in your environment - Yes    Updated 5/2009       MEDS:  See EMR, reviewed  ALL:  See EMR, reviewed    REVIEW OF SYSTEMS:  CONSTITUTIONAL:NEGATIVE for fever, chills, change in weight  INTEGUMENTARY/SKIN: NEGATIVE for worrisome rashes, moles or lesions  EYES: NEGATIVE for vision changes or irritation  ENT/MOUTH: NEGATIVE for ear, mouth and throat problems  RESP:NEGATIVE for significant cough or SOB  BREAST: NEGATIVE for masses, tenderness or discharge  CV: NEGATIVE for chest pain, palpitations or peripheral edema  GI: NEGATIVE for nausea, abdominal pain, heartburn, or change in bowel habits  :NEGATIVE for frequency, dysuria, or hematuria  :NEGATIVE for frequency, dysuria, or hematuria  NEURO: NEGATIVE for weakness, dizziness or paresthesias  ENDOCRINE: NEGATIVE for temperature intolerance, skin/hair changes  HEME/ALLERGY/IMMUNE: NEGATIVE for bleeding problems  PSYCHIATRIC: NEGATIVE for changes in mood or affect      Subjective: This 49-year-old male who 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  It depends upon angles and positions.  He notes infarct abduction and far internal rotation he can notice some discomfort.  He is also noted some discomfort about the lateral epicondyle of his right elbow with gripping and grasping.    Objective overhead impingement signs of the left shoulder are positive but he has full active and passive range of motion.  He is nontender of the AC joint anterior cuff or biceps tendon.  Strength is intact bilaterally the deltoid, supraspinatus, infraspinatus and subscapularis.  Lovington's test is negative.  Peripheral pulses strong and symmetrical.  He has had forward, shoulder forward posture.  No scapular winging.  Distal pulses and  sensation are intact.  The left elbow has full range of motion through flexion and extension that is symmetrical to his nonaffected elbow.  He is tender at the lateral epicondyle but nontender over the ulnar groove or medial epicondyle.  Wrist extension against resistance can reproduce lateral cysts elbow discomfort.  Third finger extension strength is intact.  Peripheral pulses strong and symmetrical sensation is intact distally.  Appropriate in conversation affect.    An x-ray of the left shoulder shows good maintenance of the glenohumeral joint space with some very small peripheral spurs at the inferior glenohumeral joint but overall no significant signs of DJD.  Some mild AC joint DJD with good maintenance of the joint space.  Type I-II acromion.    Assessment rotator cuff tendinitis left shoulder.  Lateral epicondylitis right elbow.      Plan: For the elbow we discussed the option of blood base injections but he understands that these are not paid for by insurance and I cannot guarantee their efficacy.  We discussed the possibility of a single cortisone injection but he knows I would not recommend repetitive ones over time.  For the shoulder we discussed the option of physical therapy to focus on posterior shoulder and scapular strength protocol with postural cues.  It was not improving a subacromial injection could be considered.  He would like to see physical therapy in [place] and would like a counterforce brace to use for the next 6 weeks for his right elbow.  Localized friction massage to the tennis elbow attachment.  Follow-up if not improved.    This note was created with the use of Dragon software and unintentional spelling or errors may have occurred.                  Jan Cox MD

## 2019-09-16 NOTE — PROGRESS NOTES
"Sports Medicine Clinic Visit    PCP: David Santoyo LENORA Aguilera is a RHD 49 year old male who is seen  in consultation at the request of Dr. Santoyo presenting with left shoulder and right elbow pain.     Injury: None    Location of Pain: left superiorshoulder and right posterolateral elbow   Duration of Pain: 8-10 month(s) shoulder, 1 year elbow  Rating of Pain: 6/10   Pain is better with: Limiting activities  Pain is worse with: Abduction and at night for shoulder and gripping and hockey for elbow  Additional Features: Left arm tingles while driving (before shoulder started hurting)  Treatment so far consists of: Limiting activites  Prior History of related problems: None    BP (!) 141/103 (BP Location: Right arm, Patient Position: Sitting, Cuff Size: Adult Regular)   Pulse 85   Ht 1.727 m (5' 8\")   Wt 87.3 kg (192 lb 8 oz)   BMI 29.27 kg/m         PMH:  Past Medical History:   Diagnosis Date     Allergic rhinitis, cause unspecified     Allergic rhinitis     Essential hypertension, benign      History of blood transfusion      Other chronic allergic conjunctivitis     Allergic conjunctivitis       Active problem list:  Patient Active Problem List   Diagnosis     Allergic rhinitis     Other chronic allergic conjunctivitis     Hypertension goal BP (blood pressure) < 140/90     Hyperlipidemia LDL goal <130     Chronic abdominal pain     Testis pain       FH:  Family History   Problem Relation Age of Onset     Breast Cancer Mother      Arthritis Father      Genitourinary Problems Father         stones     Cancer Father         skin     C.A.D. Father         stents/age late 60s     Coronary Artery Disease Father         Stents     Prostate Cancer Father         late 50s     Asthma Sister      Depression Sister      Cancer Paternal Grandfather         ? type     Eye Disorder Paternal Grandfather         cateracts       SH:  Social History     Socioeconomic History     Marital status:      Spouse name: " Not on file     Number of children: Not on file     Years of education: Not on file     Highest education level: Not on file   Occupational History     Not on file   Social Needs     Financial resource strain: Not on file     Food insecurity:     Worry: Not on file     Inability: Not on file     Transportation needs:     Medical: Not on file     Non-medical: Not on file   Tobacco Use     Smoking status: Never Smoker     Smokeless tobacco: Never Used   Substance and Sexual Activity     Alcohol use: Yes     Comment: sporatically on weekends     Drug use: No     Sexual activity: Yes     Partners: Female   Lifestyle     Physical activity:     Days per week: Not on file     Minutes per session: Not on file     Stress: Not on file   Relationships     Social connections:     Talks on phone: Not on file     Gets together: Not on file     Attends Congregational service: Not on file     Active member of club or organization: Not on file     Attends meetings of clubs or organizations: Not on file     Relationship status: Not on file     Intimate partner violence:     Fear of current or ex partner: Not on file     Emotionally abused: Not on file     Physically abused: Not on file     Forced sexual activity: Not on file   Other Topics Concern     Parent/sibling w/ CABG, MI or angioplasty before 65F 55M? No   Social History Narrative    Dairy/d 2-3 servings/d     Caffeine 1-2 servings/d    Exercise 2 x week-gym regularly treadmill/hockey    Sunscreen used - No    Seatbelts used - Yes    Working smoke/CO detectors in the home - Yes    Guns stored in the home - No    Self Breast Exams - NA    Self Testicular Exam - yes    Eye Exam up to date - Yes   2008    Dental Exam up to date - Yes  2008    Pap Smear up to date - NA    Mammogram up to date - NA    PSA up to date - No    Dexa Scan up to date - NA    Flex Sig / Colonoscopy up to date - No    Immunizations up to date - 2006 td    Abuse: Current or Past(Physical, Sexual or Emotional)-  No    Do you feel safe in your environment - Yes    Updated 5/2009       MEDS:  See EMR, reviewed  ALL:  See EMR, reviewed    REVIEW OF SYSTEMS:  CONSTITUTIONAL:NEGATIVE for fever, chills, change in weight  INTEGUMENTARY/SKIN: NEGATIVE for worrisome rashes, moles or lesions  EYES: NEGATIVE for vision changes or irritation  ENT/MOUTH: NEGATIVE for ear, mouth and throat problems  RESP:NEGATIVE for significant cough or SOB  BREAST: NEGATIVE for masses, tenderness or discharge  CV: NEGATIVE for chest pain, palpitations or peripheral edema  GI: NEGATIVE for nausea, abdominal pain, heartburn, or change in bowel habits  :NEGATIVE for frequency, dysuria, or hematuria  :NEGATIVE for frequency, dysuria, or hematuria  NEURO: NEGATIVE for weakness, dizziness or paresthesias  ENDOCRINE: NEGATIVE for temperature intolerance, skin/hair changes  HEME/ALLERGY/IMMUNE: NEGATIVE for bleeding problems  PSYCHIATRIC: NEGATIVE for changes in mood or affect      Subjective: This 49-year-old male who 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  It depends upon angles and positions.  He notes infarct abduction and far internal rotation he can notice some discomfort.  He is also noted some discomfort about the lateral epicondyle of his right elbow with gripping and grasping.    Objective overhead impingement signs of the left shoulder are positive but he has full active and passive range of motion.  He is nontender of the AC joint anterior cuff or biceps tendon.  Strength is intact bilaterally the deltoid, supraspinatus, infraspinatus and subscapularis.  Hunt's test is negative.  Peripheral pulses strong and symmetrical.  He has had forward, shoulder forward posture.  No scapular winging.  Distal pulses and sensation are intact.  The left elbow has full range of motion through flexion and extension that  is symmetrical to his nonaffected elbow.  He is tender at the lateral epicondyle but nontender over the ulnar groove or medial epicondyle.  Wrist extension against resistance can reproduce lateral cysts elbow discomfort.  Third finger extension strength is intact.  Peripheral pulses strong and symmetrical sensation is intact distally.  Appropriate in conversation affect.    An x-ray of the left shoulder shows good maintenance of the glenohumeral joint space with some very small peripheral spurs at the inferior glenohumeral joint but overall no significant signs of DJD.  Some mild AC joint DJD with good maintenance of the joint space.  Type I-II acromion.    Assessment rotator cuff tendinitis left shoulder.  Lateral epicondylitis right elbow.      Plan: For the elbow we discussed the option of blood base injections but he understands that these are not paid for by insurance and I cannot guarantee their efficacy.  We discussed the possibility of a single cortisone injection but he knows I would not recommend repetitive ones over time.  For the shoulder we discussed the option of physical therapy to focus on posterior shoulder and scapular strength protocol with postural cues.  It was not improving a subacromial injection could be considered.  He would like to see physical therapy in [place] and would like a counterforce brace to use for the next 6 weeks for his right elbow.  Localized friction massage to the tennis elbow attachment.  Follow-up if not improved.    This note was created with the use of Dragon software and unintentional spelling or errors may have occurred.

## 2019-09-16 NOTE — Clinical Note
Thank you for allowing me to see your patient in Sports Medicine Clinic.  Please see the attached copy of our visit.Sincerely,Jan Cox MD

## 2019-09-20 ENCOUNTER — HOSPITAL ENCOUNTER (OUTPATIENT)
Facility: AMBULATORY SURGERY CENTER | Age: 49
End: 2019-09-20
Attending: INTERNAL MEDICINE
Payer: COMMERCIAL

## 2019-09-20 VITALS
OXYGEN SATURATION: 97 % | DIASTOLIC BLOOD PRESSURE: 73 MMHG | SYSTOLIC BLOOD PRESSURE: 118 MMHG | HEART RATE: 80 BPM | TEMPERATURE: 98.4 F | RESPIRATION RATE: 16 BRPM | WEIGHT: 193 LBS | HEIGHT: 68 IN | BODY MASS INDEX: 29.25 KG/M2

## 2019-09-20 LAB — COLONOSCOPY: NORMAL

## 2019-09-20 RX ORDER — ONDANSETRON 4 MG/1
4 TABLET, ORALLY DISINTEGRATING ORAL EVERY 6 HOURS PRN
Status: DISCONTINUED | OUTPATIENT
Start: 2019-09-20 | End: 2019-09-21 | Stop reason: HOSPADM

## 2019-09-20 RX ORDER — FLUMAZENIL 0.1 MG/ML
0.2 INJECTION, SOLUTION INTRAVENOUS
Status: ACTIVE | OUTPATIENT
Start: 2019-09-20 | End: 2019-09-20

## 2019-09-20 RX ORDER — LIDOCAINE 40 MG/G
CREAM TOPICAL
Status: DISCONTINUED | OUTPATIENT
Start: 2019-09-20 | End: 2019-09-20 | Stop reason: HOSPADM

## 2019-09-20 RX ORDER — NALOXONE HYDROCHLORIDE 0.4 MG/ML
.1-.4 INJECTION, SOLUTION INTRAMUSCULAR; INTRAVENOUS; SUBCUTANEOUS
Status: DISCONTINUED | OUTPATIENT
Start: 2019-09-20 | End: 2019-09-21 | Stop reason: HOSPADM

## 2019-09-20 RX ORDER — ONDANSETRON 2 MG/ML
4 INJECTION INTRAMUSCULAR; INTRAVENOUS EVERY 6 HOURS PRN
Status: DISCONTINUED | OUTPATIENT
Start: 2019-09-20 | End: 2019-09-21 | Stop reason: HOSPADM

## 2019-09-20 RX ORDER — SIMETHICONE
LIQUID (ML) MISCELLANEOUS PRN
Status: DISCONTINUED | OUTPATIENT
Start: 2019-09-20 | End: 2019-09-20 | Stop reason: HOSPADM

## 2019-09-20 RX ORDER — FENTANYL CITRATE 50 UG/ML
INJECTION, SOLUTION INTRAMUSCULAR; INTRAVENOUS PRN
Status: DISCONTINUED | OUTPATIENT
Start: 2019-09-20 | End: 2019-09-20 | Stop reason: HOSPADM

## 2019-09-20 RX ORDER — ONDANSETRON 2 MG/ML
4 INJECTION INTRAMUSCULAR; INTRAVENOUS
Status: DISCONTINUED | OUTPATIENT
Start: 2019-09-20 | End: 2019-09-20 | Stop reason: HOSPADM

## 2019-09-20 ASSESSMENT — MIFFLIN-ST. JEOR: SCORE: 1704.94

## 2019-09-23 ENCOUNTER — THERAPY VISIT (OUTPATIENT)
Dept: PHYSICAL THERAPY | Facility: CLINIC | Age: 49
End: 2019-09-23
Attending: FAMILY MEDICINE
Payer: COMMERCIAL

## 2019-09-23 DIAGNOSIS — M75.82 ROTATOR CUFF TENDINITIS, LEFT: ICD-10-CM

## 2019-09-23 LAB — COPATH REPORT: NORMAL

## 2019-09-23 PROCEDURE — 97530 THERAPEUTIC ACTIVITIES: CPT | Mod: GP | Performed by: PHYSICAL THERAPIST

## 2019-09-23 PROCEDURE — 97110 THERAPEUTIC EXERCISES: CPT | Mod: GP | Performed by: PHYSICAL THERAPIST

## 2019-09-23 PROCEDURE — 97161 PT EVAL LOW COMPLEX 20 MIN: CPT | Mod: GP | Performed by: PHYSICAL THERAPIST

## 2019-09-23 NOTE — PROGRESS NOTES
[facility] Initial Evaluation  Subjective:  Pt presents to PT with c/o L shoulder pain with insidious onset.  Pt reports he enjoys playing hockey and is not sure of its related.      Pt has a desk job.   He is currently avoiding weight lifting in the gym.  He is working more on cardio.  Pt enjoys playing hockey.        PMH: R Tennis elbow, R shoulder pain    The history is provided by the patient. No  was used.   Gregory Aguilera being seen for shoulder pain.   Problem began 1/1/2019. Where condition occurred: for unknown reasons.Problem occurred: unknown  and reported as 1/10 on pain scale. General health as reported by patient is good. Pertinent medical history includes:  High blood pressure.    Surgeries include:  Other. Other surgery history details: thumb.  Current medications:  High blood pressure medication and other. Other medications details: heartburn.   Primary job tasks include:  Computer work.  Pain is described as aching, burning, sharp, shooting and stabbing and is intermittent. Pain is the same all the time. Since onset symptoms are gradually worsening. Special tests:  X-ray.     Patient is Legal. Restrictions include:  Working in normal job without restrictions.    Barriers include:  None as reported by patient.    Gregory Aguilera being seen for shoulder pain.   Problem began 1/1/2019. Where condition occurred: for unknown reasons.Problem occurred: unknown  and reported as 1/10 on pain scale. General health as reported by patient is good. Pertinent medical history includes:  High blood pressure.    Surgeries include:  Other. Other surgery history details: thumb.  Current medications:  High blood pressure medication and other. Other medications details: heartburn.   Primary job tasks include:  Computer work.  Pain is described as aching, burning, sharp, shooting and stabbing and is intermittent. Pain is the same all the time. Since onset symptoms are  gradually worsening. Special tests:  X-ray.     Patient is Legal. Restrictions include:  Working in normal job without restrictions.    Barriers include:  None as reported by patient.    Type of problem:  Left shoulder   Condition occurred with:  Repetition/overuse and unknown cause. This is a new condition    Patient reports pain:  Posterior and anterior. Radiates to: n/a. Associated symptoms:  Painful arc. Exacerbated by: Reaching out to th side, lifting, reaching. and relieved by rest.                      Objective:  System              Cervical/Thoracic Evaluation  Cervical AROM: normal                                  Shoulder Evaluation:  ROM:  AROM:  normal (pain reaching behind back)                            PROM:    Flexion:  Left:  WNL    Right: WNL          Internal Rotation:  Left:  Unable    Right:  1 inch off sacrum  External Rotation:  Left:  20    Right:  45                    Strength:    Flexion: Left:5-/5   Pain:    Right: 5-/5     Pain:     Abduction:  Left: 5-/5  Pain:    Right: 5-/5     Pain:                Stability Testing:  not assessed      Special Tests:  not assessed      Palpation:  Palpation assessed shoulder: TTP L AC joint.                                         General     ROS    Assessment/Plan:    Patient is a 49 year old male with left side shoulder complaints.    Patient has the following significant findings with corresponding treatment plan.                Diagnosis 1:  L shoulder pain  Pain -  hot/cold therapy  Decreased ROM/flexibility - manual therapy and therapeutic exercise  Decreased joint mobility - manual therapy and therapeutic exercise  Decreased strength - therapeutic exercise and therapeutic activities  Decreased proprioception - neuro re-education and therapeutic activities  Impaired muscle performance - neuro re-education  Decreased function - therapeutic activities  Impaired posture - neuro re-education    Therapy Evaluation Codes:   1) History comprised  of:   Personal factors that impact the plan of care:      None.    Comorbidity factors that impact the plan of care are:      None.     Medications impacting care: None.  2) Examination of Body Systems comprised of:   Body structures and functions that impact the plan of care:      Shoulder.   Activity limitations that impact the plan of care are:      Bathing, Bending, Dressing and Lifting.  3) Clinical presentation characteristics are:   Stable/Uncomplicated.  4) Decision-Making    Low complexity using standardized patient assessment instrument and/or measureable assessment of functional outcome.  Cumulative Therapy Evaluation is: Low complexity.    Previous and current functional limitations:  (See Goal Flow Sheet for this information)    Short term and Long term goals: (See Goal Flow Sheet for this information)     Communication ability:  Patient appears to be able to clearly communicate and understand verbal and written communication and follow directions correctly.  Treatment Explanation - The following has been discussed with the patient:   RX ordered/plan of care  Anticipated outcomes  Possible risks and side effects  This patient would benefit from PT intervention to resume normal activities.   Rehab potential is good.    Frequency:  1 X week, once daily  Duration:  for 6 weeks  Discharge Plan:  Achieve all LTG.  Independent in home treatment program.  Return to previous functional level by discharge.  Reach maximal therapeutic benefit.    Please refer to the daily flowsheet for treatment today, total treatment time and time spent performing 1:1 timed codes.

## 2019-10-02 ENCOUNTER — THERAPY VISIT (OUTPATIENT)
Dept: PHYSICAL THERAPY | Facility: CLINIC | Age: 49
End: 2019-10-02
Payer: COMMERCIAL

## 2019-10-02 DIAGNOSIS — M75.82 ROTATOR CUFF TENDINITIS, LEFT: Primary | ICD-10-CM

## 2019-10-02 PROCEDURE — 97110 THERAPEUTIC EXERCISES: CPT | Mod: GP | Performed by: PHYSICAL THERAPIST

## 2019-10-02 PROCEDURE — 97530 THERAPEUTIC ACTIVITIES: CPT | Mod: GP | Performed by: PHYSICAL THERAPIST

## 2019-10-30 ENCOUNTER — THERAPY VISIT (OUTPATIENT)
Dept: PHYSICAL THERAPY | Facility: CLINIC | Age: 49
End: 2019-10-30
Payer: COMMERCIAL

## 2019-10-30 DIAGNOSIS — M75.82 ROTATOR CUFF TENDINITIS, LEFT: Primary | ICD-10-CM

## 2019-10-30 PROCEDURE — 97530 THERAPEUTIC ACTIVITIES: CPT | Mod: GP | Performed by: PHYSICAL THERAPIST

## 2019-10-30 PROCEDURE — 97110 THERAPEUTIC EXERCISES: CPT | Mod: GP | Performed by: PHYSICAL THERAPIST

## 2019-11-05 ENCOUNTER — HEALTH MAINTENANCE LETTER (OUTPATIENT)
Age: 49
End: 2019-11-05

## 2019-11-11 ENCOUNTER — E-VISIT (OUTPATIENT)
Dept: FAMILY MEDICINE | Facility: CLINIC | Age: 49
End: 2019-11-11
Payer: COMMERCIAL

## 2019-11-11 DIAGNOSIS — R51.9 NONINTRACTABLE HEADACHE, UNSPECIFIED CHRONICITY PATTERN, UNSPECIFIED HEADACHE TYPE: Primary | ICD-10-CM

## 2019-11-11 PROCEDURE — 99444 ZZC PHYSICIAN ONLINE EVALUATION & MANAGEMENT SERVICE: CPT | Performed by: FAMILY MEDICINE

## 2020-01-15 ENCOUNTER — OFFICE VISIT (OUTPATIENT)
Dept: GASTROENTEROLOGY | Facility: CLINIC | Age: 50
End: 2020-01-15
Attending: FAMILY MEDICINE
Payer: COMMERCIAL

## 2020-01-15 VITALS
BODY MASS INDEX: 29.02 KG/M2 | HEIGHT: 68 IN | WEIGHT: 191.5 LBS | DIASTOLIC BLOOD PRESSURE: 92 MMHG | HEART RATE: 74 BPM | OXYGEN SATURATION: 99 % | SYSTOLIC BLOOD PRESSURE: 137 MMHG

## 2020-01-15 DIAGNOSIS — R10.11 ABDOMINAL PAIN, RIGHT UPPER QUADRANT: Primary | ICD-10-CM

## 2020-01-15 PROCEDURE — 99204 OFFICE O/P NEW MOD 45 MIN: CPT | Performed by: INTERNAL MEDICINE

## 2020-01-15 ASSESSMENT — PAIN SCALES - GENERAL: PAINLEVEL: MILD PAIN (2)

## 2020-01-15 ASSESSMENT — MIFFLIN-ST. JEOR: SCORE: 1698.14

## 2020-01-15 NOTE — NURSING NOTE
"Gregory Aguilera's goals for this visit include:   Chief Complaint   Patient presents with     Consult     Epigastric pain; Esophageal discomfort; Was seen by [facility] and was found to have eosinophilic esophagitis; denies constipation or diarrhea; was taking omeprazole and has stopped this; patient is taking noratriptyline       He requests these members of his care team be copied on today's visit information: PCP    PCP: David Santoyo    Referring Provider:  David Santoyo MD  [address] [place] [address]    BP (!) 137/92 (BP Location: Left arm, Patient Position: Sitting, Cuff Size: Adult Regular)   Pulse 74   Ht 1.727 m (5' 8\")   Wt 86.9 kg (191 lb 8 oz)   SpO2 99%   BMI 29.12 kg/m      Do you need any medication refills at today's visit? No    Marycruz Moreno LPN      "

## 2020-01-15 NOTE — PATIENT INSTRUCTIONS
Continue pepcid as needed for reflux.  You can also try adding gaviscon.  If you are still having persistent symptoms after a few weeks, I would recommend going back on omeprazole 20mg daily.  Please have an abdominal ultrasound done. I'll review your previous records and let you know if we need to change anything.

## 2020-01-15 NOTE — PROGRESS NOTES
"GI CLINIC VISIT    CC/REFERRING MD:  David Santoyo  REASON FOR CONSULTATION:   Davidgolden Guthrie Natanael for   Chief Complaint   Patient presents with     Consult     Epigastric pain; Esophageal discomfort; Was seen by [facility] and was found to have eosinophilic esophagitis; denies constipation or diarrhea; was taking omeprazole and has stopped this; patient is taking noratriptyline         HPI    Patient here today with multiple GI complaints. Has previously been followed primarily by Mariluz Goldsmith. Symptoms are chest pain and epigastric pain.  Says he feels as though he is \"breathing in dry air\" from time to time.  Also has intermittent back/neck pain that sometimes radiates around to the front of his abdomen and chest and is wondering if its related to his reflux. Says he was diagnosed with EoE.  Was on PPI with good response but quit it because he was concerned about long term side effects so he  tapered off of it slowly - last dose was a few days ago.  Has been using pepcid as needed but doesn't seem to be helping much and does think his symptoms are worse since stopping it. Has had dysphagia in the past but none recently.  Does take nortriptyline which he was given by GI but thinks he has some side effects from it (feels as though his heart is racing but when he checks his pulse its normal).     Has intermittent LLQ pain - no association with eating or BMs and states pain comes and goes sporadically.  Also has intermittent RUQ pain/epigastric pain - sometimes associated with eating but not always. Seems to be worse with tomato based foods, coffee, spicy foods and alcohol.    Normal BMs. Recent c-scope with multiple small TAs but otherwise normal.      Unsure if anxiety is an issue.  Does admit that he \"internalizes\" things.  States most of his symptoms improve when he exercises more frequently.     ROS:    No fevers or chills  No weight loss  No blurry vision, double vision or change in vision  No sore throat  No " lymphadenopathy  No headache, paraesthesias, or weakness in a limb  No shortness of breath or wheezing  No chest pain or pressure  No arthralgias or myalgias  No rashes or skin changes  No odynophagia or dysphagia  No BRBPR, hematochezia, melena  No dysuria, frequency or urgency  No hot/cold intolerance or polyria  No anxiety or depression    PROBLEM LIST  Patient Active Problem List    Diagnosis Date Noted     Testis pain 10/29/2014     Priority: Medium     Chronic abdominal pain 03/13/2014     Priority: Medium     Hyperlipidemia LDL goal <130 05/02/2013     Priority: Medium     Hypertension goal BP (blood pressure) < 140/90 09/01/2011     Priority: Medium     Allergic rhinitis 03/18/2005     Priority: Medium     Allergic rhinitis  Problem list name updated by automated process. Provider to review       Other chronic allergic conjunctivitis 03/18/2005     Priority: Medium     Allergic conjunctivitis         PERTINENT PAST MEDICAL HISTORY:  Past Medical History:   Diagnosis Date     Allergic rhinitis, cause unspecified     Allergic rhinitis     Essential hypertension, benign      History of blood transfusion      Other chronic allergic conjunctivitis     Allergic conjunctivitis     Tubular adenoma x3 09/20/2019    Surveillance colonoscopy recommended 9/2022       PREVIOUS SURGERIES:  Past Surgical History:   Procedure Laterality Date     CLOSED REDUCTION, PERCUTANEOUS PINNING FINGER, COMBINED  5/25/2012    Procedure:COMBINED CLOSED REDUCTION, PERCUTANEOUS PINNING FINGER; Closed reductionand fixation by pinning of Right Thumb fracture       COLONOSCOPY N/A 8/25/2014    Procedure: COMBINED COLONOSCOPY, SINGLE BIOPSY/POLYPECTOMY BY BIOPSY;  Surgeon: Misael Jarvis MD;  Location:  GI     COLONOSCOPY N/A 9/20/2019    Procedure: COLONOSCOPY, WITH POLYPECTOMY AND BIOPSY;  Surgeon: Fred Edge MD;  Location:  OR     DENTAL SURGERY       ESOPHAGOSCOPY, GASTROSCOPY, DUODENOSCOPY (EGD), COMBINED  6/17/2013     Procedure: COMBINED ESOPHAGOSCOPY, GASTROSCOPY, DUODENOSCOPY (EGD), BIOPSY SINGLE OR MULTIPLE;;  Surgeon: Benjy Marinelli MD;  Location:  GI     ESOPHAGOSCOPY, GASTROSCOPY, DUODENOSCOPY (EGD), COMBINED N/A 8/25/2014    Procedure: COMBINED ESOPHAGOSCOPY, GASTROSCOPY, DUODENOSCOPY (EGD), BIOPSY SINGLE OR MULTIPLE;  Surgeon: Misael Jarvis MD;  Location:  GI     LASIK BILATERAL       thumb surgery       wisdom teeth removed  8-2014       PREVIOUS ENDOSCOPY:    ALLERGIES:     Allergies   Allergen Reactions     Dust Mites      Grass      Mold      No Known Drug Allergies      Trees        PERTINENT MEDICATIONS:    Current Outpatient Medications:      Alum Hydroxide-Mag Carbonate (GAVISCON PO), Take 1 tablet by mouth as needed UK version, Disp: , Rfl:      lisinopril (PRINIVIL/ZESTRIL) 20 MG tablet, TAKE 1 TABLET (20 MG) BY MOUTH DAILY, Disp: 90 tablet, Rfl: 3     NORTRIPTYLINE HCL PO, Take 25 mg by mouth At Bedtime, Disp: , Rfl:      Probiotic Product (PROBIOTIC DAILY PO), Take 1 capsule by mouth as needed, Disp: , Rfl:      Wheat Dextrin (BENEFIBER DRINK MIX PO), Take by mouth as needed, Disp: , Rfl:      cyclobenzaprine (FLEXERIL) 10 MG tablet, Take 10 mg by mouth, Disp: , Rfl:      Esomeprazole Magnesium (NEXIUM PO), Take 40 mg by mouth 2 times daily, Disp: , Rfl:      fluticasone (FLOVENT DISKUS) 50 MCG/BLIST AEPB, Inhale 1 puff into the lungs daily as needed , Disp: , Rfl:      loratadine (CLARITIN REDITABS) 10 MG dispersible tablet, Take 10 mg by mouth as needed , Disp: , Rfl:      metroNIDAZOLE (METROGEL) 0.75 % external gel, Apply topically 2 times daily (Patient not taking: Reported on 1/15/2020), Disp: 45 g, Rfl: 0     Montelukast Sodium (SINGULAIR PO), Take 10 mg by mouth as needed , Disp: , Rfl:     SOCIAL HISTORY:  Social History     Socioeconomic History     Marital status:      Spouse name: Not on file     Number of children: Not on file     Years of education: Not on file      Highest education level: Not on file   Occupational History     Not on file   Social Needs     Financial resource strain: Not on file     Food insecurity:     Worry: Not on file     Inability: Not on file     Transportation needs:     Medical: Not on file     Non-medical: Not on file   Tobacco Use     Smoking status: Never Smoker     Smokeless tobacco: Never Used   Substance and Sexual Activity     Alcohol use: Yes     Comment: sporatically on weekends     Drug use: No     Sexual activity: Yes     Partners: Female   Lifestyle     Physical activity:     Days per week: Not on file     Minutes per session: Not on file     Stress: Not on file   Relationships     Social connections:     Talks on phone: Not on file     Gets together: Not on file     Attends Voodoo service: Not on file     Active member of club or organization: Not on file     Attends meetings of clubs or organizations: Not on file     Relationship status: Not on file     Intimate partner violence:     Fear of current or ex partner: Not on file     Emotionally abused: Not on file     Physically abused: Not on file     Forced sexual activity: Not on file   Other Topics Concern     Parent/sibling w/ CABG, MI or angioplasty before 65F 55M? No   Social History Narrative    Dairy/d 2-3 servings/d     Caffeine 1-2 servings/d    Exercise 2 x week-gym regularly treadmill/hockey    Sunscreen used - No    Seatbelts used - Yes    Working smoke/CO detectors in the home - Yes    Guns stored in the home - No    Self Breast Exams - NA    Self Testicular Exam - yes    Eye Exam up to date - Yes   2008    Dental Exam up to date - Yes  2008    Pap Smear up to date - NA    Mammogram up to date - NA    PSA up to date - No    Dexa Scan up to date - NA    Flex Sig / Colonoscopy up to date - No    Immunizations up to date - 2006 td    Abuse: Current or Past(Physical, Sexual or Emotional)- No    Do you feel safe in your environment - Yes    Updated 5/2009       FAMILY  "HISTORY:  Family History   Problem Relation Age of Onset     Breast Cancer Mother      Arthritis Father      Genitourinary Problems Father         stones     Cancer Father         skin     C.A.D. Father         stents/age late 60s     Coronary Artery Disease Father         Stents     Prostate Cancer Father         late 50s     Asthma Sister      Depression Sister      Cancer Paternal Grandfather         ? type     Eye Disorder Paternal Grandfather         cateracts       Past/family/social history reviewed and no changes    PHYSICAL EXAMINATION:  Constitutional: aaox3, cooperative, pleasant, not dyspneic/diaphoretic, no acute distress  Vitals reviewed: BP (!) 137/92 (BP Location: Left arm, Patient Position: Sitting, Cuff Size: Adult Regular)   Pulse 74   Ht 1.727 m (5' 8\")   Wt 86.9 kg (191 lb 8 oz)   SpO2 99%   BMI 29.12 kg/m    Wt:   Wt Readings from Last 2 Encounters:   01/15/20 86.9 kg (191 lb 8 oz)   09/20/19 87.5 kg (193 lb)      Eyes: Sclera anicteric/injected  Ears/nose/mouth/throat: Normal oropharynx without ulcers or exudate, mucus membranes moist, hearing intact  Neck: supple, thyroid normal size  CV: No edema  Respiratory: Unlabored breathing  Lymph: No axillary, submandibular, supraclavicular or inguinal lymphadenopathy  Abd: Soft, Nondistended, +bs, no hepatosplenomegaly, nontender, no peritoneal signs  Skin: warm, perfused, no jaundice  Psych: Normal affect  MSK: Normal gait      PERTINENT STUDIES:  Most recent CBC:  Recent Labs   Lab Test 10/30/18  2135 07/07/16  1530   WBC 5.8 6.1   HGB 16.1 14.9   HCT 46.9 42.9    163     Most recent hepatic panel:  Recent Labs   Lab Test 02/03/17  1538 07/07/16  1530   ALT 19 44   AST 11 24     Most recent creatinine:  Recent Labs   Lab Test 02/01/19  1425 10/30/18  2135   CR 1.06 0.94       ASSESSMENT/PLAN:    1. Chest/epigastric pain - ?history of EoE - one pathology report with mildly increased esophageal eosinophils (15 per hpf) but others that I " have available were normal.  Does report good response in the past to PPIs but has currently weaned off of them.  Has also previously had a Bravo done which patient states was normal, although I do not have reports regarding this. Is on nortriptyline from his previous GI provider which he thinks has helped.  Will request previous records.  May benefit from resuming PPI although patient would prefer to avoid.  In the interim, can continue as needed pepcid and try gaviscon.  Pending review of previous endoscopy and biopsy reports, maybe beneficial to repeat EGD with biopsy to assess for EoE off therapy.  Given intermittent RUQ and epigastric pain, will get abdominal US for further evaluation.    RTC after testing    Thank you for this consultation.  It was a pleasure to participate in the care of this patient; please contact us with any further questions.  A total of 30 minutes, face to face, was spent with this patient, >50% of which was counseling regarding the above delineated issues.    This note was created with voice recognition software, and while reviewed for accuracy, typos may remain.     Alexa Cordero DO

## 2020-01-16 ENCOUNTER — TELEPHONE (OUTPATIENT)
Dept: GASTROENTEROLOGY | Facility: CLINIC | Age: 50
End: 2020-01-16

## 2020-01-16 NOTE — TELEPHONE ENCOUNTER
1/16 Provided phone number [phone] to schedule in about 3 months (around 4/15/2020).    Soila Skinner   Procedure    Ortho/Sports Med/Ent/Eye   [facility]   [phone]

## 2020-01-17 ENCOUNTER — ANCILLARY PROCEDURE (OUTPATIENT)
Dept: ULTRASOUND IMAGING | Facility: CLINIC | Age: 50
End: 2020-01-17
Attending: INTERNAL MEDICINE
Payer: COMMERCIAL

## 2020-01-17 DIAGNOSIS — R10.11 ABDOMINAL PAIN, RIGHT UPPER QUADRANT: ICD-10-CM

## 2020-02-01 ENCOUNTER — HOSPITAL ENCOUNTER (EMERGENCY)
Facility: CLINIC | Age: 50
Discharge: HOME | End: 2020-02-01
Attending: EMERGENCY MEDICINE | Admitting: EMERGENCY MEDICINE
Payer: COMMERCIAL

## 2020-02-01 ENCOUNTER — APPOINTMENT (OUTPATIENT)
Dept: GENERAL RADIOLOGY | Facility: CLINIC | Age: 50
End: 2020-02-01
Attending: EMERGENCY MEDICINE
Payer: COMMERCIAL

## 2020-02-01 ENCOUNTER — NURSE TRIAGE (OUTPATIENT)
Dept: NURSING | Facility: CLINIC | Age: 50
End: 2020-02-01

## 2020-02-01 VITALS
OXYGEN SATURATION: 99 % | SYSTOLIC BLOOD PRESSURE: 137 MMHG | HEART RATE: 89 BPM | BODY MASS INDEX: 28.13 KG/M2 | WEIGHT: 185 LBS | RESPIRATION RATE: 16 BRPM | DIASTOLIC BLOOD PRESSURE: 98 MMHG | TEMPERATURE: 98.5 F

## 2020-02-01 DIAGNOSIS — R50.9 FEBRILE ILLNESS: ICD-10-CM

## 2020-02-01 LAB
ALBUMIN SERPL-MCNC: 3.9 G/DL (ref 3.4–5)
ALBUMIN UR-MCNC: 30 MG/DL
ALP SERPL-CCNC: 69 U/L (ref 40–150)
ALT SERPL W P-5'-P-CCNC: 29 U/L (ref 0–70)
ANION GAP SERPL CALCULATED.3IONS-SCNC: 6 MMOL/L (ref 3–14)
APPEARANCE UR: CLEAR
AST SERPL W P-5'-P-CCNC: 23 U/L (ref 0–45)
BASOPHILS # BLD AUTO: 0 10E9/L (ref 0–0.2)
BASOPHILS NFR BLD AUTO: 0.2 %
BILIRUB SERPL-MCNC: 0.8 MG/DL (ref 0.2–1.3)
BILIRUB UR QL STRIP: NEGATIVE
BUN SERPL-MCNC: 16 MG/DL (ref 7–30)
CALCIUM SERPL-MCNC: 8.7 MG/DL (ref 8.5–10.1)
CHLORIDE SERPL-SCNC: 104 MMOL/L (ref 94–109)
CO2 SERPL-SCNC: 29 MMOL/L (ref 20–32)
COLOR UR AUTO: YELLOW
CREAT SERPL-MCNC: 1.18 MG/DL (ref 0.66–1.25)
CRP SERPL-MCNC: 71.2 MG/L (ref 0–8)
DIFFERENTIAL METHOD BLD: ABNORMAL
EOSINOPHIL # BLD AUTO: 0 10E9/L (ref 0–0.7)
EOSINOPHIL NFR BLD AUTO: 0 %
ERYTHROCYTE [DISTWIDTH] IN BLOOD BY AUTOMATED COUNT: 12.1 % (ref 10–15)
FLUAV+FLUBV AG SPEC QL: NEGATIVE
FLUAV+FLUBV AG SPEC QL: NEGATIVE
GFR SERPL CREATININE-BSD FRML MDRD: 71 ML/MIN/{1.73_M2}
GLUCOSE SERPL-MCNC: 108 MG/DL (ref 70–99)
GLUCOSE UR STRIP-MCNC: NEGATIVE MG/DL
GRAN CASTS #/AREA URNS LPF: 1 /LPF
HCT VFR BLD AUTO: 47.5 % (ref 40–53)
HGB BLD-MCNC: 16.4 G/DL (ref 13.3–17.7)
HGB UR QL STRIP: NEGATIVE
IMM GRANULOCYTES # BLD: 0 10E9/L (ref 0–0.4)
IMM GRANULOCYTES NFR BLD: 0.2 %
KETONES UR STRIP-MCNC: NEGATIVE MG/DL
LEUKOCYTE ESTERASE UR QL STRIP: NEGATIVE
LYMPHOCYTES # BLD AUTO: 0.5 10E9/L (ref 0.8–5.3)
LYMPHOCYTES NFR BLD AUTO: 10.7 %
MCH RBC QN AUTO: 30.3 PG (ref 26.5–33)
MCHC RBC AUTO-ENTMCNC: 34.5 G/DL (ref 31.5–36.5)
MCV RBC AUTO: 88 FL (ref 78–100)
MONOCYTES # BLD AUTO: 0.3 10E9/L (ref 0–1.3)
MONOCYTES NFR BLD AUTO: 5.1 %
MUCOUS THREADS #/AREA URNS LPF: PRESENT /LPF
NEUTROPHILS # BLD AUTO: 4.1 10E9/L (ref 1.6–8.3)
NEUTROPHILS NFR BLD AUTO: 83.8 %
NITRATE UR QL: NEGATIVE
NRBC # BLD AUTO: 0 10*3/UL
NRBC BLD AUTO-RTO: 0 /100
PH UR STRIP: 5.5 PH (ref 5–7)
PLATELET # BLD AUTO: 129 10E9/L (ref 150–450)
POTASSIUM SERPL-SCNC: 3.9 MMOL/L (ref 3.4–5.3)
PROT SERPL-MCNC: 7.6 G/DL (ref 6.8–8.8)
RBC # BLD AUTO: 5.42 10E12/L (ref 4.4–5.9)
RBC #/AREA URNS AUTO: 1 /HPF (ref 0–2)
SODIUM SERPL-SCNC: 139 MMOL/L (ref 133–144)
SOURCE: ABNORMAL
SP GR UR STRIP: 1.02 (ref 1–1.03)
SPECIMEN SOURCE: NORMAL
SQUAMOUS #/AREA URNS AUTO: <1 /HPF (ref 0–1)
UROBILINOGEN UR STRIP-MCNC: NORMAL MG/DL (ref 0–2)
WBC # BLD AUTO: 4.9 10E9/L (ref 4–11)
WBC #/AREA URNS AUTO: 4 /HPF (ref 0–5)

## 2020-02-01 PROCEDURE — 85025 COMPLETE CBC W/AUTO DIFF WBC: CPT | Performed by: EMERGENCY MEDICINE

## 2020-02-01 PROCEDURE — 80053 COMPREHEN METABOLIC PANEL: CPT | Performed by: EMERGENCY MEDICINE

## 2020-02-01 PROCEDURE — 87804 INFLUENZA ASSAY W/OPTIC: CPT | Performed by: EMERGENCY MEDICINE

## 2020-02-01 PROCEDURE — 99284 EMERGENCY DEPT VISIT MOD MDM: CPT | Performed by: EMERGENCY MEDICINE

## 2020-02-01 PROCEDURE — 72100 X-RAY EXAM L-S SPINE 2/3 VWS: CPT

## 2020-02-01 PROCEDURE — 81001 URINALYSIS AUTO W/SCOPE: CPT | Performed by: EMERGENCY MEDICINE

## 2020-02-01 PROCEDURE — 86140 C-REACTIVE PROTEIN: CPT | Performed by: EMERGENCY MEDICINE

## 2020-02-01 PROCEDURE — 99284 EMERGENCY DEPT VISIT MOD MDM: CPT | Mod: Z6 | Performed by: EMERGENCY MEDICINE

## 2020-02-01 RX ORDER — ACETAMINOPHEN 325 MG/1
325-650 TABLET ORAL EVERY 6 HOURS PRN
COMMUNITY

## 2020-02-01 RX ORDER — LISINOPRIL 20 MG/1
20 TABLET ORAL DAILY
COMMUNITY
End: 2020-04-21

## 2020-02-01 RX ORDER — IBUPROFEN 200 MG
400 TABLET ORAL EVERY 4 HOURS PRN
COMMUNITY

## 2020-02-01 ASSESSMENT — ENCOUNTER SYMPTOMS
NECK PAIN: 1
ABDOMINAL PAIN: 0
BACK PAIN: 1
SHORTNESS OF BREATH: 0
MYALGIAS: 1
SORE THROAT: 0
SINUS PRESSURE: 0
SINUS PAIN: 0
NAUSEA: 1
COUGH: 0
RHINORRHEA: 0
APPETITE CHANGE: 1
SLEEP DISTURBANCE: 1

## 2020-02-01 NOTE — ED AVS SNAPSHOT
[facility], [place], Emergency Department  [address]  [facility] [place] [address]  Phone:  [phone]  Fax:  [phone]                                    Gregory Aguilera   MRN: [id number]    Department:  [facility], Emergency Department   Date of Visit:  2/1/2020           After Visit Summary Signature Page    I have received my discharge instructions, and my questions have been answered. I have discussed any challenges I see with this plan with the nurse or doctor.    ..........................................................................................................................................  Patient/Patient Representative Signature      ..........................................................................................................................................  Patient Representative Print Name and Relationship to Patient    ..................................................               ................................................  Date                                   Time    ..........................................................................................................................................  Reviewed by Signature/Title    ...................................................              ..............................................  Date                                               Time          22EPIC Rev 08/18

## 2020-02-01 NOTE — PATIENT INSTRUCTIONS
Preventive Health Recommendations  Male Ages 40 to 49    Yearly exam:             See your health care provider every year in order to  o   Review health changes.   o   Discuss preventive care.    o   Review your medicines if your doctor has prescribed any.    You should be tested each year for STDs (sexually transmitted diseases) if you re at risk.     Have a cholesterol test every 5 years.     Have a colonoscopy (test for colon cancer) if someone in your family has had colon cancer or polyps before age 50.     After age 45, have a diabetes test (fasting glucose). If you are at risk for diabetes, you should have this test every 3 years.      Talk with your health care provider about whether or not a prostate cancer screening test (PSA) is right for you.    Shots: Get a flu shot each year. Get a tetanus shot every 10 years.     Nutrition:    Eat at least 5 servings of fruits and vegetables daily.     Eat whole-grain bread, whole-wheat pasta and brown rice instead of white grains and rice.     Talk to your provider about Calcium and Vitamin D.     Lifestyle    Exercise for at least 150 minutes a week (30 minutes a day, 5 days a week). This will help you control your weight and prevent disease.     Limit alcohol to one drink per day.     No smoking.     Wear sunscreen to prevent skin cancer.     See your dentist every six months for an exam and cleaning.       Pharmacy Medication History  Admission medication history interview status for the 1/31/2020  admission is complete. See EPIC admission navigator for prior to admission medications     Medication history sources: Patient  Medication history source reliability: Good  Adherence assessment: na    Significant changes made to the medication list:        Additional medication history information:       Medication reconciliation completed by provider prior to medication history? No    Time spent in this activity: 5 min      Prior to Admission medications    Not on File

## 2020-02-01 NOTE — ED PROVIDER NOTES
[facility] EMERGENCY DEPARTMENT ([facility])    2/01/20       History     Chief Complaint   Patient presents with     Headache     fever, no cough, headache, body ache, upper back pain, started thursday night, was taking Ibopurofen an tylenol. last use 10:30 am, poor appetite, no travel outside the country     HPI  Gregory Aguilera is a 49 year old male who presents to the Emergency Department for headache, fever and body aches.  Patient reports that on Thursday evening (2 nights ago) he had a fever of 102 with chills and shakes.  He states he took ibuprofen for this which helped but spent most of the following day in bed with a fever.  He was alternating between ibuprofen and acetaminophen but continued to feel febrile.  He states that his symptoms have continued through today and so he decided to present.    Here in the ED, he now reports that he has body aches, feels warm and is sweating. He reports that he has had low thoracic back pain along the midline for a few months now that is not changed in character.  He also has a bit of bilateral flank discomfort and abdominal discomfort.  He has not had upper respiratory symptoms including cough, runny nose or sore throat.  He has not had dysuria, or hematuria but potentially some urinary frequency as he states that he has been drinking fluids to compensate for his symptoms.  He has not had diarrhea but has felt a little nauseous and so has not eaten much since Thursday.  He states that today he is only had a banana.    He denies any known positive contacts.  He states he did have a recent negative abdominal ultrasound to rule out any acute process given his abdominal discomfort.  Additionally, he also reports that he has had discomfort related to his esophagus that manifests in his throat and also the back of his neck.  He states that he has appointment with his provider in April to follow-up with this.    I have reviewed the Medications, Allergies, Past  Medical and Surgical History, and Social History in the [facility] system.    Past Medical History:   Diagnosis Date     Allergic rhinitis, cause unspecified     Allergic rhinitis     Essential hypertension, benign      History of blood transfusion      Other chronic allergic conjunctivitis     Allergic conjunctivitis     Tubular adenoma x3 09/20/2019    Surveillance colonoscopy recommended 9/2022       Past Surgical History:   Procedure Laterality Date     CLOSED REDUCTION, PERCUTANEOUS PINNING FINGER, COMBINED  5/25/2012    Procedure:COMBINED CLOSED REDUCTION, PERCUTANEOUS PINNING FINGER; Closed reductionand fixation by pinning of Right Thumb fracture       COLONOSCOPY N/A 8/25/2014    Procedure: COMBINED COLONOSCOPY, SINGLE BIOPSY/POLYPECTOMY BY BIOPSY;  Surgeon: Misael Jarvis MD;  Location: [facility]     COLONOSCOPY N/A 9/20/2019    Procedure: COLONOSCOPY, WITH POLYPECTOMY AND BIOPSY;  Surgeon: Fred Edge MD;  Location:  OR     DENTAL SURGERY       ESOPHAGOSCOPY, GASTROSCOPY, DUODENOSCOPY (EGD), COMBINED  6/17/2013    Procedure: COMBINED ESOPHAGOSCOPY, GASTROSCOPY, DUODENOSCOPY (EGD), BIOPSY SINGLE OR MULTIPLE;;  Surgeon: Benjy Marinelli MD;  Location: [facility]     ESOPHAGOSCOPY, GASTROSCOPY, DUODENOSCOPY (EGD), COMBINED N/A 8/25/2014    Procedure: COMBINED ESOPHAGOSCOPY, GASTROSCOPY, DUODENOSCOPY (EGD), BIOPSY SINGLE OR MULTIPLE;  Surgeon: Misael Jarvis MD;  Location: [facility]     LASIK BILATERAL       thumb surgery       wisdom teeth removed  8-2014       Family History   Problem Relation Age of Onset     Breast Cancer Mother      Arthritis Father      Genitourinary Problems Father         stones     Cancer Father         skin     C.A.D. Father         stents/age late 60s     Coronary Artery Disease Father         Stents     Prostate Cancer Father         late 50s     Asthma Sister      Depression Sister      Cancer Paternal Grandfather         ? type     Eye Disorder Paternal Grandfather          cateracts       Social History     Tobacco Use     Smoking status: Never Smoker     Smokeless tobacco: Never Used   Substance Use Topics     Alcohol use: Yes     Comment: sporatically on weekends       No current facility-administered medications for this encounter.      Current Outpatient Medications   Medication     acetaminophen (TYLENOL) 325 MG tablet     Esomeprazole Magnesium (NEXIUM PO)     ibuprofen (ADVIL/MOTRIN) 200 MG tablet     lisinopril (PRINIVIL/ZESTRIL) 20 MG tablet     lisinopril (PRINIVIL/ZESTRIL) 20 MG tablet     NORTRIPTYLINE HCL PO     Probiotic Product (PROBIOTIC DAILY PO)     Alum Hydroxide-Mag Carbonate (GAVISCON PO)     cyclobenzaprine (FLEXERIL) 10 MG tablet     fluticasone (FLOVENT DISKUS) 50 MCG/BLIST AEPB     loratadine (CLARITIN REDITABS) 10 MG dispersible tablet     Montelukast Sodium (SINGULAIR PO)     Wheat Dextrin (BENEFIBER DRINK MIX PO)        Allergies   Allergen Reactions     Dust Mites      Grass      Mold      No Known Drug Allergies      Trees         Review of Systems   Constitutional: Positive for appetite change. Fever: subjective.   HENT: Negative for congestion, rhinorrhea, sinus pressure, sinus pain, sneezing and sore throat.    Respiratory: Negative for cough and shortness of breath.    Cardiovascular: Negative for chest pain.   Gastrointestinal: Positive for nausea. Negative for abdominal pain.   Genitourinary: Flank pain: mild.   Musculoskeletal: Positive for back pain, myalgias and neck pain.   Psychiatric/Behavioral: Positive for sleep disturbance.   All other systems reviewed and are negative.      Physical Exam   BP: 113/87  Pulse: 91  Temp: 99.3  F (37.4  C)  Resp: 16  Weight: 83.9 kg (185 lb)  SpO2: 97 %      Physical Exam  Constitutional:       General: He is not in acute distress.     Appearance: He is not diaphoretic.   HENT:      Head: Atraumatic.   Eyes:      General: No scleral icterus.     Pupils: Pupils are equal, round, and reactive to light.    Cardiovascular:      Heart sounds: Normal heart sounds.   Pulmonary:      Effort: No respiratory distress.      Breath sounds: Normal breath sounds.   Abdominal:      General: Bowel sounds are normal.      Palpations: Abdomen is soft.      Tenderness: There is no abdominal tenderness.   Musculoskeletal:      Lumbar back: He exhibits tenderness and bony tenderness.        Back:    Skin:     General: Skin is warm.      Findings: No rash.         ED Course   12:30 PM  The patient was seen and examined by Sg Wilkins MD in Room ED20.       Procedures             Labs Ordered and Resulted from Time of ED Arrival Up to the Time of Departure from the ED   CBC WITH PLATELETS DIFFERENTIAL - Abnormal; Notable for the following components:       Result Value    Platelet Count 129 (*)     Absolute Lymphocytes 0.5 (*)     All other components within normal limits   COMPREHENSIVE METABOLIC PANEL - Abnormal; Notable for the following components:    Glucose 108 (*)     All other components within normal limits   CRP INFLAMMATION - Abnormal; Notable for the following components:    CRP Inflammation 71.2 (*)     All other components within normal limits   UA MACROSCOPIC WITH REFLEX TO MICRO AND CULTURE - Abnormal; Notable for the following components:    Protein Albumin Urine 30 (*)     Mucous Urine Present (*)     Granular Casts 1 (*)     All other components within normal limits   INFLUENZA A/B ANTIGEN            Assessments & Plan (with Medical Decision Making)   49-year-old male who presents with 2-day history of fever.  Differential includes influenza, urinary tract infection, pneumonia, skin infection, other infection.  Patient also mentions 2-month history of midline lower back pain.  Exam in the emergency room is entirely unremarkable.  Laboratories were obtained revealing CBC with normal white blood count but low platelet count.  CRP is elevated at 71.2 and glucose was elevated at 108 consistent with stress response.   Patient was observed for 3 hours and had no recurrence of fever.  I suspect viral etiology currently.  Patient will be discharged with symptomatic treatment and instructions to return with concerning signs or symptoms all of which were thoroughly explained.  I have recommended further evaluation for his ongoing back pain.  Currently I do not feel he warrants more emergent imaging given 2-month history of ongoing low back pain.  If patient continues to have fevers in the setting of low back pain without other etiology for elevated temperature, would consider MRI to rule out discitis versus abscess versus other.    I have reviewed the nursing notes.    I have reviewed the findings, diagnosis, plan and need for follow up with the patient.    New Prescriptions    No medications on file       Final diagnoses:   Febrile illness     Phan WOODWARD, am serving as a trained medical scribe to document services personally performed by Raphael Wilkins MD, based on the provider's statements to me.   Raphael WOODWARD MD, was physically present and have reviewed and verified the accuracy of this note documented by Phan Rodriguez.     2/1/2020   [facility], [place], EMERGENCY DEPARTMENT     Sg Wilkins MD  02/01/20 1546

## 2020-04-15 ENCOUNTER — VIRTUAL VISIT (OUTPATIENT)
Dept: GASTROENTEROLOGY | Facility: CLINIC | Age: 50
End: 2020-04-15
Payer: COMMERCIAL

## 2020-04-15 DIAGNOSIS — K21.9 GASTROESOPHAGEAL REFLUX DISEASE, ESOPHAGITIS PRESENCE NOT SPECIFIED: Primary | ICD-10-CM

## 2020-04-15 PROCEDURE — 99214 OFFICE O/P EST MOD 30 MIN: CPT | Mod: GT | Performed by: INTERNAL MEDICINE

## 2020-04-15 NOTE — PROGRESS NOTES
"Gregory Aguilera is a 50 year old male who is being evaluated via a billable video visit.      The patient has been notified of following:     \"This video visit will be conducted via a call between you and your physician/provider. We have found that certain health care needs can be provided without the need for an in-person physical exam.  This service lets us provide the care you need with a video conversation.  If a prescription is necessary we can send it directly to your pharmacy.  If lab work is needed we can place an order for that and you can then stop by our lab to have the test done at a later time.    If during the course of the call the physician/provider feels a video visit is not appropriate, you will not be charged for this service.\"     Patient has given verbal consent for Video visit? Yes    Patient would like the video invitation sent by: Send to e-mail at: [email or web address]    Video Start Time: Gregory Aguilera complains of    Chief Complaint   Patient presents with     RECHECK     3 month follow up     Follow-up today for esophageal discomfort.  Does feel better since switching back to omeprazole 40mg daily which he thinks has helped significantly but symptoms haven't completely resolved. Is also taking pepcid as needed and gaviscon.    Continues to wake up with a sore throat. Also feels like his throat is \"sensitive\" and irritated with breathing. No dysphagia.  No weight loss    Takes omeprazole in the morning usually takes pepcid at the same time.  Hasn't resumed nortriptyline - but may try it again (thinks it may have made him anxious).    Has been taking ibuprofen, aleve and acetaminophen lately for a shoulder issue.     I have reviewed and updated the patient's Past Medical History, Social History, Family History and Medication List.    ALLERGIES  Dust mites; Grass; Mold; No known drug allergies; and Trees    Additional provider notes:    Assessment/Plan:    1. GERD - improved with resuming " PPI with still some breakthrough reflux.  Discussed risks/benefits of longterm PPI use with patient - will try to decrease to 20mg daily and add nighttime H2 blocker and monitor response.  May also benefit from resuming TCA - patient says he will consider adding back nortriptyline but will stop if develops recurrent symptoms of anxiety with it.  Also discussed stopping NSAIDs if possible    RTC 6 months      Video-Visit Details    Type of service:  Video Visit  Video start time: 9:03 AM  Video End Time (time video stopped): 9:11AM - converted to telephone visit at that time as patient unable to get audio to work. - time spent on the phone - 20 minutes    Originating Location (pt. Location):   Distant Location (provider location):  [facility]     Mode of Communication:  Video Conference via Wali Cordero DO

## 2020-04-15 NOTE — PATIENT INSTRUCTIONS
Try to avoid NSAIDs (ibuprofen, motrin, aleve, naproxen).  Continue omeprazole daily but decrease to 20mg  - you can also pepcid at night time to see if it helps.      You may also want to try your nortriptyline again as it has helped you in the past.  I would only change one medication at a time

## 2020-04-21 ENCOUNTER — VIRTUAL VISIT (OUTPATIENT)
Dept: FAMILY MEDICINE | Facility: CLINIC | Age: 50
End: 2020-04-21
Payer: COMMERCIAL

## 2020-04-21 DIAGNOSIS — M75.82 ROTATOR CUFF TENDINITIS, LEFT: Primary | ICD-10-CM

## 2020-04-21 DIAGNOSIS — I10 HYPERTENSION GOAL BP (BLOOD PRESSURE) < 140/90: ICD-10-CM

## 2020-04-21 PROCEDURE — 99213 OFFICE O/P EST LOW 20 MIN: CPT | Mod: TEL | Performed by: NURSE PRACTITIONER

## 2020-04-21 RX ORDER — LISINOPRIL 20 MG/1
TABLET ORAL
Qty: 90 TABLET | Refills: 3 | Status: SHIPPED | OUTPATIENT
Start: 2020-04-21 | End: 2021-04-26

## 2020-04-21 NOTE — PROGRESS NOTES
"Gregory Aguilera is a 50 year old male who is being evaluated via a billable telephone visit.      The patient has been notified of following:     \"This telephone visit will be conducted via a call between you and your physician/provider. We have found that certain health care needs can be provided without the need for a physical exam.  This service lets us provide the care you need with a short phone conversation.  If a prescription is necessary we can send it directly to your pharmacy.  If lab work is needed we can place an order for that and you can then stop by our lab to have the test done at a later time.    Telephone visits are billed at different rates depending on your insurance coverage. During this emergency period, for some insurers they may be billed the same as an in-person visit.  Please reach out to your insurance provider with any questions.    If during the course of the call the physician/provider feels a telephone visit is not appropriate, you will not be charged for this service.\"    Patient has given verbal consent for Telephone visit?  Yes    How would you like to obtain your AVS? MyChart    Subjective     Gregory Aguilera is a 50 year old male who presents to clinic today for the following health issues:    HPI     He has had left shoulder pain for several months.  He saw sports medicine last September and did PT.  He has continued with home exercises, but his shoulder pain has been worsening.    He is doing well on Lisinopril and denies any side effects.  He does not check his blood pressure at home, but it has been normal when checked at the dentist and clinic recently.               Patient Active Problem List   Diagnosis     Allergic rhinitis     Other chronic allergic conjunctivitis     Hypertension goal BP (blood pressure) < 140/90     Hyperlipidemia LDL goal <130     Chronic abdominal pain     Testis pain     Past Surgical History:   Procedure Laterality Date     CLOSED REDUCTION, " PERCUTANEOUS PINNING FINGER, COMBINED  5/25/2012    Procedure:COMBINED CLOSED REDUCTION, PERCUTANEOUS PINNING FINGER; Closed reductionand fixation by pinning of Right Thumb fracture       COLONOSCOPY N/A 8/25/2014    Procedure: COMBINED COLONOSCOPY, SINGLE BIOPSY/POLYPECTOMY BY BIOPSY;  Surgeon: Misael Jarvis MD;  Location: [facility]     COLONOSCOPY N/A 9/20/2019    Procedure: COLONOSCOPY, WITH POLYPECTOMY AND BIOPSY;  Surgeon: Fred Edge MD;  Location: [facility]     DENTAL SURGERY       ESOPHAGOSCOPY, GASTROSCOPY, DUODENOSCOPY (EGD), COMBINED  6/17/2013    Procedure: COMBINED ESOPHAGOSCOPY, GASTROSCOPY, DUODENOSCOPY (EGD), BIOPSY SINGLE OR MULTIPLE;;  Surgeon: Benjy Marinelli MD;  Location: [facility]     ESOPHAGOSCOPY, GASTROSCOPY, DUODENOSCOPY (EGD), COMBINED N/A 8/25/2014    Procedure: COMBINED ESOPHAGOSCOPY, GASTROSCOPY, DUODENOSCOPY (EGD), BIOPSY SINGLE OR MULTIPLE;  Surgeon: Misael Jarvis MD;  Location: [facility]     LASIK BILATERAL       thumb surgery       wisdom teeth removed  8-2014       Social History     Tobacco Use     Smoking status: Never Smoker     Smokeless tobacco: Never Used   Substance Use Topics     Alcohol use: Yes     Comment: couple drinks a week     Family History   Problem Relation Age of Onset     Breast Cancer Mother      Arthritis Father      Genitourinary Problems Father         stones     Cancer Father         skin     C.A.D. Father         stents/age late 60s     Coronary Artery Disease Father         Stents     Prostate Cancer Father         late 50s     Asthma Sister      Depression Sister      Cancer Paternal Grandfather         ? type     Eye Disorder Paternal Grandfather         cateracts           Reviewed and updated as needed this visit by Provider         Review of Systems   ROS COMP: CONSTITUTIONAL: NEGATIVE for fever, chills, change in weight  ENT/MOUTH: NEGATIVE for ear, mouth and throat problems  RESP: NEGATIVE for significant cough or SOB  CV: NEGATIVE  for chest pain, palpitations or peripheral edema  GI: NEGATIVE for nausea, abdominal pain, heartburn, or change in bowel habits  MUSCULOSKELETAL: see HPI  NEURO: NEGATIVE for weakness, dizziness or paresthesias  PSYCHIATRIC: NEGATIVE for changes in mood or affect       Objective   Reported vitals:  There were no vitals taken for this visit.     PSYCH: Alert and oriented times 3; coherent speech, normal   rate and volume, able to articulate logical thoughts, able   to abstract reason, no tangential thoughts, no hallucinations   or delusions  His affect is normal  RESP: No cough, no audible wheezing, able to talk in full sentences  Remainder of exam unable to be completed due to telephone visits            Assessment/Plan:  1. Rotator cuff tendinitis, left  Will refer back to sports medicine to see if he can be seen at this time for possible steroid injection.   - SPORTS MEDICINE REFERRAL    2. Hypertension goal BP (blood pressure) < 140/90  At goal  The current medical regimen is effective;  continue present plan and medications.   Labs were done in February at an ED visit.   Follow up in one month.   - lisinopril (ZESTRIL) 20 MG tablet; TAKE 1 TABLET (20 MG) BY MOUTH DAILY  Dispense: 90 tablet; Refill: 3    No follow-ups on file.      Phone call duration:  11 minutes    Sheila Mason NP

## 2020-04-22 ENCOUNTER — TELEPHONE (OUTPATIENT)
Dept: ORTHOPEDICS | Facility: CLINIC | Age: 50
End: 2020-04-22

## 2020-05-19 ENCOUNTER — TELEPHONE (OUTPATIENT)
Dept: ORTHOPEDICS | Facility: CLINIC | Age: 50
End: 2020-05-19

## 2020-05-19 NOTE — TELEPHONE ENCOUNTER
Called Hemant to inform him that Dr. Cox is doing virtual visits on Tuesday and would not be available for that potential CSI.  He was OK waiting for a few days to see Dr. Oakley on 5/28 at 1PM for an evaluation and potential CSI.    He thanked me for the call.   - Austin EDDY ATC

## 2020-05-28 ENCOUNTER — OFFICE VISIT (OUTPATIENT)
Dept: ORTHOPEDICS | Facility: CLINIC | Age: 50
End: 2020-05-28
Payer: COMMERCIAL

## 2020-05-28 VITALS — WEIGHT: 186.4 LBS | HEIGHT: 68 IN | BODY MASS INDEX: 28.25 KG/M2

## 2020-05-28 DIAGNOSIS — M75.02 ADHESIVE CAPSULITIS OF LEFT SHOULDER: Primary | ICD-10-CM

## 2020-05-28 ASSESSMENT — MIFFLIN-ST. JEOR: SCORE: 1670

## 2020-05-28 NOTE — PROGRESS NOTES
Subjective:   Gregory Aguilera is a 50 year old RHD male who presents with bilateral shoulder pain. The left shoulder is the worst. He reports limited motion in left shoulder.     Background:   Date of injury: None  Duration of symptoms: 1 year left shoulder and 2 months for right shoulder  Mechanism of Injury: Chronic; Unknown   Intensity: 0/10 at rest, 9/10 with activity   Aggravating factors: Movements >90 degrees; He reports radiating pain down his left upper extremity and numbness in left finger when he sleeps on left shoulder at night.  Relieving Factors: Nothing- tried tylenol and ibuprofen   Prior Evaluation: Dr. Cox, xray of left shoulder, physical therapy    Patient has been having pain for quite a few months, but has been unable to be seen due to COVID.  He reports that his pain has somewhat plateaued.  Stiffness is getting worse and he notices that his shoulder is elevating in order to try and increase his range of motion.  He does have occasional numbness and tingling in the ulnar distribution into his hand that seems to start of his elbow.  No neck pain or radicular symptoms that travel his entire arm.  He tends to have numbness and tingling in his pinky and ring finger when he wakes up or when he is driving holding the steering wheel.  He did do physical therapy this last fall and felt like it was improving, but when he tried to do it again earlier this year it was too painful for him and he did not feel like it was helping.  He takes occasional ibuprofen for his pain which is only minimally helpful.  No mechanical symptoms with shoulder range of motion.  He has never previously injured the shoulder that he is aware of.  He is very limited in all activities.  He also notes that his right shoulder has started to hurt in similar ways, and wonders if it is because he is compensating.    PAST MEDICAL, SOCIAL, SURGICAL AND FAMILY HISTORY: He  has a past medical history of Allergic rhinitis, cause  "unspecified, Essential hypertension, benign, History of blood transfusion, Other chronic allergic conjunctivitis, and Tubular adenoma x3 (09/20/2019). He also has no past medical history of Arthritis, Asthma, Cerebral infarction (H), Congestive heart failure (H), Diabetes mellitus (H), Heart disease, Malignant neoplasm (H), or Thyroid disease.  He  has a past surgical history that includes Closed reduction, percutaneous pinning finger, combined (5/25/2012); Esophagoscopy, gastroscopy, duodenoscopy (EGD), combined (6/17/2013); Lasik bilateral; wisdom teeth removed (8-2014); Esophagoscopy, gastroscopy, duodenoscopy (EGD), combined (N/A, 8/25/2014); Colonoscopy (N/A, 8/25/2014); thumb surgery; Dental surgery; and Colonoscopy (N/A, 9/20/2019).  His family history includes Arthritis in his father; Asthma in his sister; Breast Cancer in his mother; C.A.D. in his father; Cancer in his father and paternal grandfather; Coronary Artery Disease in his father; Depression in his sister; Eye Disorder in his paternal grandfather; Genitourinary Problems in his father; Prostate Cancer in his father.  He reports that he has never smoked. He has never used smokeless tobacco. He reports current alcohol use. He reports that he does not use drugs.    ALLERGIES: He is allergic to dust mites; grass; mold; no known drug allergies; and trees.    CURRENT MEDICATIONS: He has a current medication list which includes the following prescription(s): acetaminophen, alum hydroxide-mag carbonate, cyclobenzaprine, diclofenac, esomeprazole magnesium, fluticasone, ibuprofen, lisinopril, loratadine, montelukast sodium, nortriptyline hcl, probiotic product, and wheat dextrin.     REVIEW OF SYSTEMS: 6 point review of systems is negative except as noted above.     Exam:   Ht 1.727 m (5' 8\")   Wt 84.6 kg (186 lb 6.4 oz)   BMI 28.34 kg/m       CONSTITUTIONAL: healthy, alert and no distress  HEAD: Normocephalic. No masses, lesions, tenderness or " abnormalities  SKIN: no suspicious lesions or rashes  GAIT: normal  NEUROLOGIC: Non-focal  PSYCHIATRIC: affect normal/bright and mentation appears normal.    MUSCULOSKELETAL:   NECK: Full range of motion.  Mild spasm of upper traps on the left.  Negative Spurling bilaterally.  Full strength.  Subjective decreased sensation in C8 distribution of the hand.  Reflexes 2+ at biceps, triceps and brachial radialis.  RIGHT SHOULDER: Active forward flexion approximately 50 degrees, abduction approximately 30 degrees, external rotation approximately 10 degrees and internal rotation to side of hip.  Passive range of motion very similar to active range of motion with considerable restriction.  Strength relatively intact despite difficulties with range of motion.  Empty can negative.  NTTP diffusely.  No pain to palpation over the biceps tendon.  Negative speeds and Yergason's.  RIGHT ELBOW: Full range of motion.  Negative Tinel's at the cubital tunnel.     Assessment/Plan:   Gregory Aguilera is here for continued left shoulder pain.  He has noted increased stiffness over the last few weeks, but the pain has somewhat plateaued.  He has significant limitations given his restricted range of motion.  Neck exam unremarkable, and it does not appear to be his pain generator.  He has exam findings consistent with adhesive capsulitis.  His intermittent ulnar paresthesias could be from cubital tunnel compression, or secondary to upper trapezius spasm compressing on his nerve.  We will continue to monitor this.    -Physical therapy ordered  -Diclofenac 50 mg twice daily for 2 weeks, then as needed  -Given his significant GERD discussed taking omeprazole twice daily while taking diclofenac.  He will only take diclofenac as needed if he has significant GI upset while taking scheduled.  -Follow-up in approximately 1 month  -Could consider intra-articular glenohumeral joint injection at follow-up if he is still having issues  -If still  having issues, or worsening symptoms could consider advanced imaging at follow-up if appropriate    Seen and discussed with Dr. Zuleta.    Gisele Oakley DO  Primary Care Sports Medicine Fellow

## 2020-05-28 NOTE — PROGRESS NOTES
Attending Note:   I have personally examined this patient and have reviewed the clinical presentation and progress note with the fellow. I agree with the treatment plan as outlined. The plan was formulated with the fellow on the day of the patient's visit. I have reviewed all imaging with the fellow and agree with the findings in the documentation.     Fallon Zuleta MD, CAQ, CCD  [facility]  Sports Medicine and Bone Health

## 2020-05-28 NOTE — LETTER
5/28/2020      RE: Gregory Aguilera  [address]  [place] [address]        Subjective:   Gregory Aguilera is a 50 year old RHD male who presents with bilateral shoulder pain. The left shoulder is the worst. He reports limited motion in left shoulder.     Background:   Date of injury: None  Duration of symptoms: 1 year left shoulder and 2 months for right shoulder  Mechanism of Injury: Chronic; Unknown   Intensity: 0/10 at rest, 9/10 with activity   Aggravating factors: Movements >90 degrees; He reports radiating pain down his left upper extremity and numbness in left finger when he sleeps on left shoulder at night.  Relieving Factors: Nothing- tried tylenol and ibuprofen   Prior Evaluation: Dr. Cox, xray of left shoulder, physical therapy    Patient has been having pain for quite a few months, but has been unable to be seen due to COVID.  He reports that his pain has somewhat plateaued.  Stiffness is getting worse and he notices that his shoulder is elevating in order to try and increase his range of motion.  He does have occasional numbness and tingling in the ulnar distribution into his hand that seems to start of his elbow.  No neck pain or radicular symptoms that travel his entire arm.  He tends to have numbness and tingling in his pinky and ring finger when he wakes up or when he is driving holding the steering wheel.  He did do physical therapy this last fall and felt like it was improving, but when he tried to do it again earlier this year it was too painful for him and he did not feel like it was helping.  He takes occasional ibuprofen for his pain which is only minimally helpful.  No mechanical symptoms with shoulder range of motion.  He has never previously injured the shoulder that he is aware of.  He is very limited in all activities.  He also notes that his right shoulder has started to hurt in similar ways, and wonders if it is because he is compensating.    PAST MEDICAL, SOCIAL,  "SURGICAL AND FAMILY HISTORY: He  has a past medical history of Allergic rhinitis, cause unspecified, Essential hypertension, benign, History of blood transfusion, Other chronic allergic conjunctivitis, and Tubular adenoma x3 (09/20/2019). He also has no past medical history of Arthritis, Asthma, Cerebral infarction (H), Congestive heart failure (H), Diabetes mellitus (H), Heart disease, Malignant neoplasm (H), or Thyroid disease.  He  has a past surgical history that includes Closed reduction, percutaneous pinning finger, combined (5/25/2012); Esophagoscopy, gastroscopy, duodenoscopy (EGD), combined (6/17/2013); Lasik bilateral; wisdom teeth removed (8-2014); Esophagoscopy, gastroscopy, duodenoscopy (EGD), combined (N/A, 8/25/2014); Colonoscopy (N/A, 8/25/2014); thumb surgery; Dental surgery; and Colonoscopy (N/A, 9/20/2019).  His family history includes Arthritis in his father; Asthma in his sister; Breast Cancer in his mother; C.A.D. in his father; Cancer in his father and paternal grandfather; Coronary Artery Disease in his father; Depression in his sister; Eye Disorder in his paternal grandfather; Genitourinary Problems in his father; Prostate Cancer in his father.  He reports that he has never smoked. He has never used smokeless tobacco. He reports current alcohol use. He reports that he does not use drugs.    ALLERGIES: He is allergic to dust mites; grass; mold; no known drug allergies; and trees.    CURRENT MEDICATIONS: He has a current medication list which includes the following prescription(s): acetaminophen, alum hydroxide-mag carbonate, cyclobenzaprine, diclofenac, esomeprazole magnesium, fluticasone, ibuprofen, lisinopril, loratadine, montelukast sodium, nortriptyline hcl, probiotic product, and wheat dextrin.     REVIEW OF SYSTEMS: 6 point review of systems is negative except as noted above.     Exam:   Ht 1.727 m (5' 8\")   Wt 84.6 kg (186 lb 6.4 oz)   BMI 28.34 kg/m       CONSTITUTIONAL: healthy, " alert and no distress  HEAD: Normocephalic. No masses, lesions, tenderness or abnormalities  SKIN: no suspicious lesions or rashes  GAIT: normal  NEUROLOGIC: Non-focal  PSYCHIATRIC: affect normal/bright and mentation appears normal.    MUSCULOSKELETAL:   NECK: Full range of motion.  Mild spasm of upper traps on the left.  Negative Spurling bilaterally.  Full strength.  Subjective decreased sensation in C8 distribution of the hand.  Reflexes 2+ at biceps, triceps and brachial radialis.  RIGHT SHOULDER: Active forward flexion approximately 50 degrees, abduction approximately 30 degrees, external rotation approximately 10 degrees and internal rotation to side of hip.  Passive range of motion very similar to active range of motion with considerable restriction.  Strength relatively intact despite difficulties with range of motion.  Empty can negative.  NTTP diffusely.  No pain to palpation over the biceps tendon.  Negative speeds and Yergason's.  RIGHT ELBOW: Full range of motion.  Negative Tinel's at the cubital tunnel.     Assessment/Plan:   Gregory Aguilera is here for continued left shoulder pain.  He has noted increased stiffness over the last few weeks, but the pain has somewhat plateaued.  He has significant limitations given his restricted range of motion.  Neck exam unremarkable, and it does not appear to be his pain generator.  He has exam findings consistent with adhesive capsulitis.  His intermittent ulnar paresthesias could be from cubital tunnel compression, or secondary to upper trapezius spasm compressing on his nerve.  We will continue to monitor this.    -Physical therapy ordered  -Diclofenac 50 mg twice daily for 2 weeks, then as needed  -Given his significant GERD discussed taking omeprazole twice daily while taking diclofenac.  He will only take diclofenac as needed if he has significant GI upset while taking scheduled.  -Follow-up in approximately 1 month  -Could consider intra-articular  glenohumeral joint injection at follow-up if he is still having issues  -If still having issues, or worsening symptoms could consider advanced imaging at follow-up if appropriate    Seen and discussed with Dr. Zuleta.    Gisele Oakley DO  Primary Care Sports Medicine Fellow      Attending Note:   I have personally examined this patient and have reviewed the clinical presentation and progress note with the fellow. I agree with the treatment plan as outlined. The plan was formulated with the fellow on the day of the patient's visit. I have reviewed all imaging with the fellow and agree with the findings in the documentation.     Fallon Zuleta MD, CAQ, CCD  [facility]  Sports Medicine and Bone Health    Gisele Oakley, DO

## 2020-06-17 ENCOUNTER — TELEPHONE (OUTPATIENT)
Dept: ORTHOPEDICS | Facility: CLINIC | Age: 50
End: 2020-06-17

## 2020-07-01 ENCOUNTER — THERAPY VISIT (OUTPATIENT)
Dept: PHYSICAL THERAPY | Facility: CLINIC | Age: 50
End: 2020-07-01
Attending: STUDENT IN AN ORGANIZED HEALTH CARE EDUCATION/TRAINING PROGRAM
Payer: COMMERCIAL

## 2020-07-01 DIAGNOSIS — M75.02 ADHESIVE CAPSULITIS OF LEFT SHOULDER: ICD-10-CM

## 2020-07-01 PROCEDURE — 97140 MANUAL THERAPY 1/> REGIONS: CPT | Mod: GP | Performed by: PHYSICAL THERAPIST

## 2020-07-01 PROCEDURE — 97110 THERAPEUTIC EXERCISES: CPT | Mod: GP | Performed by: PHYSICAL THERAPIST

## 2020-07-01 PROCEDURE — 97161 PT EVAL LOW COMPLEX 20 MIN: CPT | Mod: GP | Performed by: PHYSICAL THERAPIST

## 2020-07-01 NOTE — PROGRESS NOTES
KEY PT FINDINGS:  1) Significantly restricted range of motion in all planes  2) Deferred resisted testing due to lack of ROM  3) Did not tolerate stretching due to painful end point - initiated isometrics.     Physical Therapy Initial Evaluation: Subjective History     Injury/Condition Details:  Presenting Complaint Left shoulder pain + stiffness   Onset Timing/Date Years   Mechanism Gradual onset, no injury or trauma that he can recall. Maybe playing more hockey. Has had to stop playing hockey due to shoulder pain.   Has a history of tennis elbow on his right side.      Symptom Behavior Details    Primary Symptoms Constant symptoms; worsen with activity, pain (Location: Posterior shoulder, down into the triceps, no radiations into the lower arm, hand.  Quality: Sharp and Aching/Throbbing), stiffness, weakness   Worst Pain 8/10 (with see below)   Symptom Provocators Sleeping on his left side  Swinging a bat when playing with his kids  Weakness   Walking is irritating   Best Pain 0/10    Symptom Relievers Activity modification  Anti-inflammatory   Time of day dependent? No   Recent symptom change? no change in symptoms     Prior Testing/Intervention for current condition:  Prior Tests  x-ray   Prior Treatment PT      Lifestyle & General Medical History:  Employment Computer work at [employer]    Usual physical activities  (within past year) Minimal currently due to shoulder pain + pandemic.   Previously played hockey 1x/week   Orthopaedic history See Epic Chart   Notable medical history See Epic Chart   Patient Reported Health good     Answers for HPI/ROS submitted by the patient on 6/29/2020   History Reported by Patient  Reason for Visit:: Shoulder pain/lack of flexability  When problem began:: 6/29/2020  How problem occurred:: Unknown  Number scale: 2/10  General health as reported by patient: good  Please check all that apply to your current or past medical history: none  Medical allergies:  none  Surgeries: other  Other Surgery Detail: Thumb  Medications you are currently taking: anti-inflammatory, high blood pressure medication  Occupation:: Legal   What are your primary job tasks: computer work      Physical Therapy Initial Evaluation: Objective Testing  SHOULDER:    Cervical Screen: Negative, full motion    Posture: Forward head, rounded shoulders    Shoulder:   ROM L ROM R MMT/Resisted Testing L MMT/Resisted Testing R   Flexion 115 160     Abduction 65 170     IR Sacral boarder T12     ER 15 70     Mid Trap MMT: deferred /5 - lacking ROM to get into testing position  Low Trap MMT: deferred/5 - lacking ROM to get into testing position    Scapulothoracic Screen: Mild winging noted on the right side, left unable to assess.     Palpation: Anterior shoulder tenderness, muscular tightness noted at posterior shoulder    Assessment/Plan:  Patient is a 50 year old male with left side shoulder complaints.    Patient has the following significant findings with corresponding treatment plan.                Diagnosis 1:  Shoulder pain + stiffness  Pain -  hot/cold therapy, manual therapy, STS, splint/taping/bracing/orthotics, self management and education  Decreased ROM/flexibility - manual therapy, therapeutic exercise and home program  Decreased joint mobility - manual therapy, therapeutic exercise and home program  Decreased strength - therapeutic exercise, therapeutic activities and home program  Decreased function - therapeutic activities and home program    Therapy Evaluation Codes:   1) History comprised of:   Personal factors that impact the plan of care:      None.    Comorbidity factors that impact the plan of care are:      None.     Medications impacting care: None.  2) Examination of Body Systems comprised of:   Body structures and functions that impact the plan of care:      Shoulder.   Activity limitations that impact the plan of care are:      Bathing, Dressing, Grasping, Lifting,  Throwing and Sleeping.  3) Clinical presentation characteristics are:   Stable/Uncomplicated.  4) Decision-Making    Low complexity using standardized patient assessment instrument and/or measureable assessment of functional outcome.  Cumulative Therapy Evaluation is: Low complexity.    Previous and current functional limitations:  (See Goal Flow Sheet for this information)    Short term and Long term goals: (See Goal Flow Sheet for this information)     Communication ability:  Patient appears to be able to clearly communicate and understand verbal and written communication and follow directions correctly.  Treatment Explanation - The following has been discussed with the patient:   RX ordered/plan of care  Anticipated outcomes  Possible risks and side effects  This patient would benefit from PT intervention to resume normal activities.   Rehab potential is good.    Frequency:  1 X week, once daily  Duration:  for 6 weeks  Discharge Plan:  Achieve all LTG.  Independent in home treatment program.  Reach maximal therapeutic benefit.    Please refer to the daily flowsheet for treatment today, total treatment time and time spent performing 1:1 timed codes.

## 2020-07-07 ENCOUNTER — TELEPHONE (OUTPATIENT)
Dept: ORTHOPEDICS | Facility: CLINIC | Age: 50
End: 2020-07-07

## 2020-07-07 NOTE — TELEPHONE ENCOUNTER
Confirmed appointment time on 7/16 with Dr. Thrasher. Reviewed current restrictions and the patient verbalized understanding.

## 2020-07-16 ENCOUNTER — THERAPY VISIT (OUTPATIENT)
Dept: PHYSICAL THERAPY | Facility: CLINIC | Age: 50
End: 2020-07-16
Payer: COMMERCIAL

## 2020-07-16 ENCOUNTER — OFFICE VISIT (OUTPATIENT)
Dept: ORTHOPEDICS | Facility: CLINIC | Age: 50
End: 2020-07-16
Payer: COMMERCIAL

## 2020-07-16 VITALS — HEIGHT: 68 IN | WEIGHT: 186 LBS | BODY MASS INDEX: 28.19 KG/M2

## 2020-07-16 DIAGNOSIS — M75.02 ADHESIVE CAPSULITIS OF LEFT SHOULDER: ICD-10-CM

## 2020-07-16 PROCEDURE — 97140 MANUAL THERAPY 1/> REGIONS: CPT | Mod: GP | Performed by: PHYSICAL THERAPIST

## 2020-07-16 PROCEDURE — 97110 THERAPEUTIC EXERCISES: CPT | Mod: GP | Performed by: PHYSICAL THERAPIST

## 2020-07-16 ASSESSMENT — MIFFLIN-ST. JEOR: SCORE: 1668.19

## 2020-07-16 NOTE — PROGRESS NOTES
SUBJECTIVE: Gregory Aguilera is a 50 year old male who is presenting for follow-up of his adhesive capsulitis. He is improving subjectively. He has been enrolled in PT and he thinks his ROM is improving. His pain has improved with active rom to his full extent. Pain at extreme ranges. He is continuing to have numbness in his left ring finger and pinky when he awakens after sleeping on that side. Denies fever, chills, n/v/d, appetite changes, back pain, visino changes, cough, chest pain, sob, urinary incotninence, dysuria, bowel problems, or back pain    PAST MEDICAL, SOCIAL, SURGICAL AND FAMILY HISTORY: He  has a past medical history of Adhesive capsulitis of left shoulder (7/1/2020), Allergic rhinitis, cause unspecified, Essential hypertension, benign, History of blood transfusion, Other chronic allergic conjunctivitis, and Tubular adenoma x3 (09/20/2019). He also has no past medical history of Asthma, Cerebral infarction (H), Congestive heart failure (H), Diabetes mellitus (H), Heart disease, Malignant neoplasm (H), or Thyroid disease.  He  has a past surgical history that includes Closed reduction, percutaneous pinning finger, combined (5/25/2012); Esophagoscopy, gastroscopy, duodenoscopy (EGD), combined (6/17/2013); Lasik bilateral; wisdom teeth removed (8-2014); Esophagoscopy, gastroscopy, duodenoscopy (EGD), combined (N/A, 8/25/2014); Colonoscopy (N/A, 8/25/2014); thumb surgery; Dental surgery; and Colonoscopy (N/A, 9/20/2019).  His family history includes Arthritis in his father; Asthma in his sister; Breast Cancer in his mother; C.A.D. in his father; Cancer in his father and paternal grandfather; Coronary Artery Disease in his father; Depression in his sister; Eye Disorder in his paternal grandfather; Genitourinary Problems in his father; Prostate Cancer in his father.  He reports that he has never smoked. He has never used smokeless tobacco. He reports current alcohol use. He reports that he does not use  "drugs.      ALLERGIES: He is allergic to dust mites; grass; mold; no known drug allergies; and trees.    CURRENT MEDICATIONS: He has a current medication list which includes the following prescription(s): acetaminophen, alum hydroxide-mag carbonate, diclofenac, esomeprazole magnesium, fluticasone, ibuprofen, lisinopril, loratadine, montelukast sodium, nortriptyline hcl, probiotic product, wheat dextrin, and cyclobenzaprine.     REVIEW OF SYSTEMS: 9 point review of systems is negative except as noted above.    EXAM:  Ht 1.727 m (5' 8\")   Wt 84.4 kg (186 lb)   BMI 28.28 kg/m    CONSTITUTIONIAL: healthy, alert and no distress  HEAD: Normocephalic. No masses, lesions, tenderness or abnormalities  ENT: ENT exam normal, no neck nodes or sinus tenderness  SKIN: no suspicious lesions or rashes  NEUROLOGIC: Normal muscle tone and strength, reflexes normal, sensation grossly normal.  PSYCHIATRIC: affect normal/bright and mentation appears normal.    MUSCULOSKELETAL: Left shoulder active rom to 170 degrees flexion, 80 degress abduction, 20 degrees extension. Active rom the same. Negative speeds, yergasons, and obriens. Strenth 4/5 of left shoulder compared to right, likely secondary to pain.       ASSESSMENT/PLAN:  50 year old male presenting with adhesive capsulitis wuith no inciting event. He is otherwise improving at this time. Enrolled in PT, pain controlled with oral NSAID's.     Plan  1. Refilled diclofenac, #45 ts use as directed. Script printed and iven to patient  2. Continue PT as scheduled  3. Hold on injection at this time as patient appears to be improvintg. Patient to contact if he regresses during this next cycyle of PT or his pain is not respinding to oral pain medications    D/w Dr. Merlyn Thrasher, DO  Sports Medicine Fellow      "

## 2020-07-16 NOTE — PROGRESS NOTES
Attending Note:   I have personally examined this patient and have reviewed the clinical presentation and progress note with the fellow. I agree with the treatment plan as outlined. The plan was formulated with the fellow on the day of the patient's visit. I have reviewed all imaging with the fellow and agree with the findings in the documentation.     Fallon Zuleta MD, CAQ, CCD  [facility]  Sports Medicine and Bone Health

## 2020-07-16 NOTE — PROGRESS NOTES
Subjective:   Gregory Aguilera is a 50 year old RHD male who presents with bilateral shoulder pain. The left shoulder is the worst. He reports limited motion in left shoulder.     Background:   Date of injury: None  Duration of symptoms: 1 year left shoulder and 2 months for right shoulder  Mechanism of Injury: Chronic; Unknown   Intensity: 0/10 at rest, 9/10 with activity   Aggravating factors: Movements >90 degrees; He reports radiating pain down his left upper extremity and numbness in left finger when he sleeps on left shoulder at night.  Relieving Factors: Nothing- tried tylenol and ibuprofen   Prior Evaluation: Dr. Cox, xray of left shoulder, physical therapy    Patient has been having pain for quite a few months, but has been unable to be seen due to COVID.  He reports that his pain has somewhat plateaued.  Stiffness is getting worse and he notices that his shoulder is elevating in order to try and increase his range of motion.  He does have occasional numbness and tingling in the ulnar distribution into his hand that seems to start of his elbow.  No neck pain or radicular symptoms that travel his entire arm.  He tends to have numbness and tingling in his pinky and ring finger when he wakes up or when he is driving holding the steering wheel.  He did do physical therapy this last fall and felt like it was improving, but when he tried to do it again earlier this year it was too painful for him and he did not feel like it was helping.  He takes occasional ibuprofen for his pain which is only minimally helpful.  No mechanical symptoms with shoulder range of motion.  He has never previously injured the shoulder that he is aware of.  He is very limited in all activities.  He also notes that his right shoulder has started to hurt in similar ways, and wonders if it is because he is compensating.    PAST MEDICAL, SOCIAL, SURGICAL AND FAMILY HISTORY: He  has a past medical history of Adhesive capsulitis of left  "shoulder (7/1/2020), Allergic rhinitis, cause unspecified, Essential hypertension, benign, History of blood transfusion, Other chronic allergic conjunctivitis, and Tubular adenoma x3 (09/20/2019). He also has no past medical history of Asthma, Cerebral infarction (H), Congestive heart failure (H), Diabetes mellitus (H), Heart disease, Malignant neoplasm (H), or Thyroid disease.  He  has a past surgical history that includes Closed reduction, percutaneous pinning finger, combined (5/25/2012); Esophagoscopy, gastroscopy, duodenoscopy (EGD), combined (6/17/2013); Lasik bilateral; wisdom teeth removed (8-2014); Esophagoscopy, gastroscopy, duodenoscopy (EGD), combined (N/A, 8/25/2014); Colonoscopy (N/A, 8/25/2014); thumb surgery; Dental surgery; and Colonoscopy (N/A, 9/20/2019).  His family history includes Arthritis in his father; Asthma in his sister; Breast Cancer in his mother; C.A.D. in his father; Cancer in his father and paternal grandfather; Coronary Artery Disease in his father; Depression in his sister; Eye Disorder in his paternal grandfather; Genitourinary Problems in his father; Prostate Cancer in his father.  He reports that he has never smoked. He has never used smokeless tobacco. He reports current alcohol use. He reports that he does not use drugs.    ALLERGIES: He is allergic to dust mites; grass; mold; no known drug allergies; and trees.    CURRENT MEDICATIONS: He has a current medication list which includes the following prescription(s): acetaminophen, alum hydroxide-mag carbonate, diclofenac, esomeprazole magnesium, fluticasone, ibuprofen, lisinopril, loratadine, montelukast sodium, nortriptyline hcl, probiotic product, wheat dextrin, and cyclobenzaprine.     REVIEW OF SYSTEMS: 6 point review of systems is negative except as noted above.     Exam:   Ht 1.727 m (5' 8\")   Wt 84.4 kg (186 lb)   BMI 28.28 kg/m       CONSTITUTIONAL: healthy, alert and no distress  HEAD: Normocephalic. No masses, " lesions, tenderness or abnormalities  SKIN: no suspicious lesions or rashes  GAIT: normal  NEUROLOGIC: Non-focal  PSYCHIATRIC: affect normal/bright and mentation appears normal.    MUSCULOSKELETAL:   NECK: Full range of motion.  Mild spasm of upper traps on the left.  Negative Spurling bilaterally.  Full strength.  Subjective decreased sensation in C8 distribution of the hand.  Reflexes 2+ at biceps, triceps and brachial radialis.  RIGHT SHOULDER: Active forward flexion approximately 50 degrees, abduction approximately 30 degrees, external rotation approximately 10 degrees and internal rotation to side of hip.  Passive range of motion very similar to active range of motion with considerable restriction.  Strength relatively intact despite difficulties with range of motion.  Empty can negative.  NTTP diffusely.  No pain to palpation over the biceps tendon.  Negative speeds and Yergason's.  RIGHT ELBOW: Full range of motion.  Negative Tinel's at the cubital tunnel.     Assessment/Plan:   Gregory Aguilera is here for continued left shoulder pain.  He has noted increased stiffness over the last few weeks, but the pain has somewhat plateaued.  He has significant limitations given his restricted range of motion.  Neck exam unremarkable, and it does not appear to be his pain generator.  He has exam findings consistent with adhesive capsulitis.  His intermittent ulnar paresthesias could be from cubital tunnel compression, or secondary to upper trapezius spasm compressing on his nerve.  We will continue to monitor this.    -Physical therapy ordered  -Diclofenac 50 mg twice daily for 2 weeks, then as needed  -Given his significant GERD discussed taking omeprazole twice daily while taking diclofenac.  He will only take diclofenac as needed if he has significant GI upset while taking scheduled.  -Follow-up in approximately 1 month  -Could consider intra-articular glenohumeral joint injection at follow-up if he is still having  issues  -If still having issues, or worsening symptoms could consider advanced imaging at follow-up if appropriate    Seen and discussed with Dr. Zuleta.    Gisele Oakley DO  Primary Care Sports Medicine Fellow

## 2020-07-16 NOTE — LETTER
7/16/2020      RE: Gregory Aguilera  [address]  [facility] [address]        Subjective:   Gregory Aguilera is a 50 year old RHD male who presents with bilateral shoulder pain. The left shoulder is the worst. He reports limited motion in left shoulder.     Background:   Date of injury: None  Duration of symptoms: 1 year left shoulder and 2 months for right shoulder  Mechanism of Injury: Chronic; Unknown   Intensity: 0/10 at rest, 9/10 with activity   Aggravating factors: Movements >90 degrees; He reports radiating pain down his left upper extremity and numbness in left finger when he sleeps on left shoulder at night.  Relieving Factors: Nothing- tried tylenol and ibuprofen   Prior Evaluation: Dr. Cox, xray of left shoulder, physical therapy    Patient has been having pain for quite a few months, but has been unable to be seen due to COVID.  He reports that his pain has somewhat plateaued.  Stiffness is getting worse and he notices that his shoulder is elevating in order to try and increase his range of motion.  He does have occasional numbness and tingling in the ulnar distribution into his hand that seems to start of his elbow.  No neck pain or radicular symptoms that travel his entire arm.  He tends to have numbness and tingling in his pinky and ring finger when he wakes up or when he is driving holding the steering wheel.  He did do physical therapy this last fall and felt like it was improving, but when he tried to do it again earlier this year it was too painful for him and he did not feel like it was helping.  He takes occasional ibuprofen for his pain which is only minimally helpful.  No mechanical symptoms with shoulder range of motion.  He has never previously injured the shoulder that he is aware of.  He is very limited in all activities.  He also notes that his right shoulder has started to hurt in similar ways, and wonders if it is because he is compensating.    PAST MEDICAL, SOCIAL,  "SURGICAL AND FAMILY HISTORY: He  has a past medical history of Adhesive capsulitis of left shoulder (7/1/2020), Allergic rhinitis, cause unspecified, Essential hypertension, benign, History of blood transfusion, Other chronic allergic conjunctivitis, and Tubular adenoma x3 (09/20/2019). He also has no past medical history of Asthma, Cerebral infarction (H), Congestive heart failure (H), Diabetes mellitus (H), Heart disease, Malignant neoplasm (H), or Thyroid disease.  He  has a past surgical history that includes Closed reduction, percutaneous pinning finger, combined (5/25/2012); Esophagoscopy, gastroscopy, duodenoscopy (EGD), combined (6/17/2013); Lasik bilateral; wisdom teeth removed (8-2014); Esophagoscopy, gastroscopy, duodenoscopy (EGD), combined (N/A, 8/25/2014); Colonoscopy (N/A, 8/25/2014); thumb surgery; Dental surgery; and Colonoscopy (N/A, 9/20/2019).  His family history includes Arthritis in his father; Asthma in his sister; Breast Cancer in his mother; C.A.D. in his father; Cancer in his father and paternal grandfather; Coronary Artery Disease in his father; Depression in his sister; Eye Disorder in his paternal grandfather; Genitourinary Problems in his father; Prostate Cancer in his father.  He reports that he has never smoked. He has never used smokeless tobacco. He reports current alcohol use. He reports that he does not use drugs.    ALLERGIES: He is allergic to dust mites; grass; mold; no known drug allergies; and trees.    CURRENT MEDICATIONS: He has a current medication list which includes the following prescription(s): acetaminophen, alum hydroxide-mag carbonate, diclofenac, esomeprazole magnesium, fluticasone, ibuprofen, lisinopril, loratadine, montelukast sodium, nortriptyline hcl, probiotic product, wheat dextrin, and cyclobenzaprine.     REVIEW OF SYSTEMS: 6 point review of systems is negative except as noted above.     Exam:   Ht 1.727 m (5' 8\")   Wt 84.4 kg (186 lb)   BMI 28.28 kg/m  "      CONSTITUTIONAL: healthy, alert and no distress  HEAD: Normocephalic. No masses, lesions, tenderness or abnormalities  SKIN: no suspicious lesions or rashes  GAIT: normal  NEUROLOGIC: Non-focal  PSYCHIATRIC: affect normal/bright and mentation appears normal.    MUSCULOSKELETAL:   NECK: Full range of motion.  Mild spasm of upper traps on the left.  Negative Spurling bilaterally.  Full strength.  Subjective decreased sensation in C8 distribution of the hand.  Reflexes 2+ at biceps, triceps and brachial radialis.  RIGHT SHOULDER: Active forward flexion approximately 50 degrees, abduction approximately 30 degrees, external rotation approximately 10 degrees and internal rotation to side of hip.  Passive range of motion very similar to active range of motion with considerable restriction.  Strength relatively intact despite difficulties with range of motion.  Empty can negative.  NTTP diffusely.  No pain to palpation over the biceps tendon.  Negative speeds and Yergason's.  RIGHT ELBOW: Full range of motion.  Negative Tinel's at the cubital tunnel.     Assessment/Plan:   Gregory Aguilera is here for continued left shoulder pain.  He has noted increased stiffness over the last few weeks, but the pain has somewhat plateaued.  He has significant limitations given his restricted range of motion.  Neck exam unremarkable, and it does not appear to be his pain generator.  He has exam findings consistent with adhesive capsulitis.  His intermittent ulnar paresthesias could be from cubital tunnel compression, or secondary to upper trapezius spasm compressing on his nerve.  We will continue to monitor this.    -Physical therapy ordered  -Diclofenac 50 mg twice daily for 2 weeks, then as needed  -Given his significant GERD discussed taking omeprazole twice daily while taking diclofenac.  He will only take diclofenac as needed if he has significant GI upset while taking scheduled.  -Follow-up in approximately 1 month  -Could  consider intra-articular glenohumeral joint injection at follow-up if he is still having issues  -If still having issues, or worsening symptoms could consider advanced imaging at follow-up if appropriate    Seen and discussed with Dr. Zuleta.    Gisele Oakley DO  Primary Care Sports Medicine Fellow    SUBJECTIVE: Gregory Aguilera is a 50 year old male who is presenting for follow-up of his adhesive capsulitis. He is improving subjectively. He has been enrolled in PT and he thinks his ROM is improving. His pain has improved with active rom to his full extent. Pain at extreme ranges. He is continuing to have numbness in his left ring finger and pinky when he awakens after sleeping on that side. Denies fever, chills, n/v/d, appetite changes, back pain, visino changes, cough, chest pain, sob, urinary incotninence, dysuria, bowel problems, or back pain    PAST MEDICAL, SOCIAL, SURGICAL AND FAMILY HISTORY: He  has a past medical history of Adhesive capsulitis of left shoulder (7/1/2020), Allergic rhinitis, cause unspecified, Essential hypertension, benign, History of blood transfusion, Other chronic allergic conjunctivitis, and Tubular adenoma x3 (09/20/2019). He also has no past medical history of Asthma, Cerebral infarction (H), Congestive heart failure (H), Diabetes mellitus (H), Heart disease, Malignant neoplasm (H), or Thyroid disease.  He  has a past surgical history that includes Closed reduction, percutaneous pinning finger, combined (5/25/2012); Esophagoscopy, gastroscopy, duodenoscopy (EGD), combined (6/17/2013); Lasik bilateral; wisdom teeth removed (8-2014); Esophagoscopy, gastroscopy, duodenoscopy (EGD), combined (N/A, 8/25/2014); Colonoscopy (N/A, 8/25/2014); thumb surgery; Dental surgery; and Colonoscopy (N/A, 9/20/2019).  His family history includes Arthritis in his father; Asthma in his sister; Breast Cancer in his mother; C.A.D. in his father; Cancer in his father and paternal grandfather; Coronary Artery  "Disease in his father; Depression in his sister; Eye Disorder in his paternal grandfather; Genitourinary Problems in his father; Prostate Cancer in his father.  He reports that he has never smoked. He has never used smokeless tobacco. He reports current alcohol use. He reports that he does not use drugs.      ALLERGIES: He is allergic to dust mites; grass; mold; no known drug allergies; and trees.    CURRENT MEDICATIONS: He has a current medication list which includes the following prescription(s): acetaminophen, alum hydroxide-mag carbonate, diclofenac, esomeprazole magnesium, fluticasone, ibuprofen, lisinopril, loratadine, montelukast sodium, nortriptyline hcl, probiotic product, wheat dextrin, and cyclobenzaprine.     REVIEW OF SYSTEMS: 9 point review of systems is negative except as noted above.    EXAM:  Ht 1.727 m (5' 8\")   Wt 84.4 kg (186 lb)   BMI 28.28 kg/m    CONSTITUTIONIAL: healthy, alert and no distress  HEAD: Normocephalic. No masses, lesions, tenderness or abnormalities  ENT: ENT exam normal, no neck nodes or sinus tenderness  SKIN: no suspicious lesions or rashes  NEUROLOGIC: Normal muscle tone and strength, reflexes normal, sensation grossly normal.  PSYCHIATRIC: affect normal/bright and mentation appears normal.    MUSCULOSKELETAL: Left shoulder active rom to 170 degrees flexion, 80 degress abduction, 20 degrees extension. Active rom the same. Negative speeds, yergasons, and obriens. Strenth 4/5 of left shoulder compared to right, likely secondary to pain.       ASSESSMENT/PLAN:  50 year old male presenting with adhesive capsulitis wuith no inciting event. He is otherwise improving at this time. Enrolled in PT, pain controlled with oral NSAID's.     Plan  1. Refilled diclofenac, #45 ts use as directed. Script printed and iven to patient  2. Continue PT as scheduled  3. Hold on injection at this time as patient appears to be improvintg. Patient to contact if he regresses during this next cycyle of " PT or his pain is not respinding to oral pain medications    D/w Dr. Merlyn Thrasher, DO  Sports Medicine Fellow        Attending Note:   I have personally examined this patient and have reviewed the clinical presentation and progress note with the fellow. I agree with the treatment plan as outlined. The plan was formulated with the fellow on the day of the patient's visit. I have reviewed all imaging with the fellow and agree with the findings in the documentation.     Fallon Zuleta MD, CAQ, CCD  [facility]  Sports Medicine and Bone Health      Chester Thrasher, DO

## 2020-07-28 ENCOUNTER — THERAPY VISIT (OUTPATIENT)
Dept: PHYSICAL THERAPY | Facility: CLINIC | Age: 50
End: 2020-07-28
Payer: COMMERCIAL

## 2020-07-28 DIAGNOSIS — M75.02 ADHESIVE CAPSULITIS OF LEFT SHOULDER: ICD-10-CM

## 2020-07-28 PROCEDURE — 97140 MANUAL THERAPY 1/> REGIONS: CPT | Mod: GP | Performed by: PHYSICAL THERAPIST

## 2020-07-28 PROCEDURE — 97110 THERAPEUTIC EXERCISES: CPT | Mod: GP | Performed by: PHYSICAL THERAPIST

## 2020-08-20 ENCOUNTER — THERAPY VISIT (OUTPATIENT)
Dept: PHYSICAL THERAPY | Facility: CLINIC | Age: 50
End: 2020-08-20
Payer: COMMERCIAL

## 2020-08-20 DIAGNOSIS — M75.02 ADHESIVE CAPSULITIS OF LEFT SHOULDER: ICD-10-CM

## 2020-08-20 PROCEDURE — 97110 THERAPEUTIC EXERCISES: CPT | Mod: GP | Performed by: PHYSICAL THERAPIST

## 2020-09-04 ENCOUNTER — THERAPY VISIT (OUTPATIENT)
Dept: PHYSICAL THERAPY | Facility: CLINIC | Age: 50
End: 2020-09-04
Payer: COMMERCIAL

## 2020-09-04 DIAGNOSIS — M75.02 ADHESIVE CAPSULITIS OF LEFT SHOULDER: ICD-10-CM

## 2020-09-04 PROCEDURE — 97140 MANUAL THERAPY 1/> REGIONS: CPT | Mod: GP | Performed by: PHYSICAL THERAPIST

## 2020-09-04 PROCEDURE — 97110 THERAPEUTIC EXERCISES: CPT | Mod: GP | Performed by: PHYSICAL THERAPIST

## 2020-09-09 ENCOUNTER — OFFICE VISIT (OUTPATIENT)
Dept: URGENT CARE | Facility: URGENT CARE | Age: 50
End: 2020-09-09
Payer: COMMERCIAL

## 2020-09-09 VITALS
TEMPERATURE: 98.9 F | WEIGHT: 185 LBS | SYSTOLIC BLOOD PRESSURE: 120 MMHG | BODY MASS INDEX: 28.04 KG/M2 | DIASTOLIC BLOOD PRESSURE: 72 MMHG | RESPIRATION RATE: 12 BRPM | HEART RATE: 80 BPM | HEIGHT: 68 IN

## 2020-09-09 DIAGNOSIS — M54.2 NECK PAIN: Primary | ICD-10-CM

## 2020-09-09 DIAGNOSIS — R07.9 CHEST PAIN, UNSPECIFIED TYPE: ICD-10-CM

## 2020-09-09 PROCEDURE — 99214 OFFICE O/P EST MOD 30 MIN: CPT | Performed by: PHYSICIAN ASSISTANT

## 2020-09-09 PROCEDURE — 93000 ELECTROCARDIOGRAM COMPLETE: CPT | Performed by: PHYSICIAN ASSISTANT

## 2020-09-09 ASSESSMENT — MIFFLIN-ST. JEOR: SCORE: 1663.65

## 2020-09-09 ASSESSMENT — ENCOUNTER SYMPTOMS: SORE THROAT: 1

## 2020-09-09 NOTE — PATIENT INSTRUCTIONS
Patient Education     Uncertain Causes of Chest Pain    Chest pain can happen for a number of reasons. Sometimes the cause can't be determined. If your condition does not seem serious, and your pain does not appear to be coming from your heart, your healthcare provider may recommend watching it closely. Sometimes the signs of a serious problem take more time to appear. Many problems not related to your heart can cause chest pain. These include:    Musculoskeletal. Costochondritis is an inflammation of the tissues around the ribs that can occur from trauma or overuse injuries, or a strain of the muscles of the chest wall    Respiratory. Pneumonia, collapsed lung (pneumothorax), or inflammation of the lining of the chest and lungs (pleurisy)    Gastrointestinal. Esophageal reflux, heartburn, ulcers, or gallbladder disease    Anxiety and panic disorders    Nerve compression and inflammation    Rare miscellaneous problems such as aortic aneurysm (a swelling of the large artery coming out of the heart) or pulmonary embolism (a blood clot in the lungs)  Home care  After your visit, follow these recommendations:    Rest today and avoid strenuous activity.    Take any prescribed medicine as directed.    Be aware of any recurrent chest pain and notice any changes  Follow-up care  Follow up with your healthcare provider if you do not start to feel better within 24 hours, or as advised.  Call 911  Call 911 if any of these occur:    A change in the type of pain: if it feels different, becomes more severe, lasts longer, or begins to spread into your shoulder, arm, neck, jaw or back    Shortness of breath or increased pain with breathing    Weakness, dizziness, or fainting    Rapid heart beat    Crushing sensation in your chest  When to seek medical advice  Call your healthcare provider right away if any of the following occur:    Cough with dark colored sputum (phlegm) or blood    Fever of 100.4 F (38 C) or higher, or as  directed by your healthcare provider    Swelling, pain or redness in one leg  Date Last Reviewed: 5/1/2018 2000-2019 [employer]. [address] [place] [address], [place] [address]. All rights reserved. This information is not intended as a substitute for professional medical care. Always follow your healthcare professional's instructions.

## 2020-09-09 NOTE — PROGRESS NOTES
"SUBJECTIVE:   Gregory Aguilera is a 50 year old male presenting with a chief complaint of   Chief Complaint   Patient presents with     Urgent Care     Neck Pain     neck discomfort over past month, gland like. tongue feels weak. Throat feels tired.      Musculoskeletal Problem     when he lifts his son or bags of chicken feed he has discomfort in chest. Feels better when he lays down. Started over past few months.        He is an established patient of [facility].  Patient states neck and throat is uncomfortable - feels like been chewing gum for a while, also feels hoarse more often.  Patient attempted tylenol - sometimes helps.  Progresses as day goes on.  Some days are not as bad as others.      Patient also states that when he picks up things (son or heavy objects), chest feels discomfort and feels tired.  Long walks and exercise does not trigger.  Patient saw a cardiologist 5 years ago with chest \"issues.\"  No SOB during episodes.  Describes as a soreness or tired feeling.  Occurs for a few hours.  Nothing makes better or worse.   No treatment attempted.  Symptoms have been occurring for a year.      No fevers, no coughing,     PMHx: seasonal allergies; eosinophilic esophagitis.      Review of Systems   HENT: Positive for sore throat.    Cardiovascular: Positive for chest pain.       Past Medical History:   Diagnosis Date     Adhesive capsulitis of left shoulder 7/1/2020     Allergic rhinitis, cause unspecified     Allergic rhinitis     Essential hypertension, benign      History of blood transfusion      Other chronic allergic conjunctivitis     Allergic conjunctivitis     Tubular adenoma x3 09/20/2019    Surveillance colonoscopy recommended 9/2022     Family History   Problem Relation Age of Onset     Breast Cancer Mother      Arthritis Father      Genitourinary Problems Father         stones     Cancer Father         skin     C.A.D. Father         stents/age late 60s     Coronary Artery Disease Father         " "Stents     Prostate Cancer Father         late 50s     Asthma Sister      Depression Sister      Cancer Paternal Grandfather         ? type     Eye Disorder Paternal Grandfather         cateracts     Current Outpatient Medications   Medication Sig Dispense Refill     Esomeprazole Magnesium (NEXIUM PO) Take 40 mg by mouth 2 times daily       lisinopril (ZESTRIL) 20 MG tablet TAKE 1 TABLET (20 MG) BY MOUTH DAILY 90 tablet 3     loratadine (CLARITIN REDITABS) 10 MG dispersible tablet Take 10 mg by mouth as needed        acetaminophen (TYLENOL) 325 MG tablet Take 325-650 mg by mouth every 6 hours as needed for mild pain       Alum Hydroxide-Mag Carbonate (GAVISCON PO) Take 1 tablet by mouth as needed UK version       cyclobenzaprine (FLEXERIL) 10 MG tablet Take 10 mg by mouth       diclofenac (VOLTAREN) 50 MG EC tablet Take 1 tablet (50 mg) by mouth 2 times daily 45 tablet 0     fluticasone (FLOVENT DISKUS) 50 MCG/BLIST AEPB Inhale 1 puff into the lungs daily as needed        ibuprofen (ADVIL/MOTRIN) 200 MG tablet Take 400 mg by mouth every 4 hours as needed for mild pain       Montelukast Sodium (SINGULAIR PO) Take 10 mg by mouth as needed        NORTRIPTYLINE HCL PO Take 25 mg by mouth At Bedtime       Probiotic Product (PROBIOTIC DAILY PO) Take 1 capsule by mouth as needed       Wheat Dextrin (BENEFIBER DRINK MIX PO) Take by mouth as needed       Social History     Tobacco Use     Smoking status: Never Smoker     Smokeless tobacco: Never Used   Substance Use Topics     Alcohol use: Yes     Comment: couple drinks a week       OBJECTIVE  /72   Pulse 80   Temp 98.9  F (37.2  C) (Oral)   Resp 12   Ht 1.727 m (5' 8\")   Wt 83.9 kg (185 lb)   BMI 28.13 kg/m      Physical Exam  Vitals signs and nursing note reviewed.   Constitutional:       General: He is not in acute distress.     Appearance: Normal appearance. He is normal weight. He is not ill-appearing or toxic-appearing.   HENT:      Head: Normocephalic and " atraumatic.      Right Ear: Tympanic membrane, ear canal and external ear normal.      Left Ear: Tympanic membrane, ear canal and external ear normal.      Nose: Nose normal.      Mouth/Throat:      Mouth: Mucous membranes are moist.      Pharynx: Oropharynx is clear.   Eyes:      Extraocular Movements: Extraocular movements intact.      Conjunctiva/sclera: Conjunctivae normal.   Neck:      Musculoskeletal: Normal range of motion and neck supple. No neck rigidity or muscular tenderness.      Vascular: No carotid bruit.   Cardiovascular:      Rate and Rhythm: Normal rate and regular rhythm.      Pulses: Normal pulses.      Heart sounds: Normal heart sounds.   Pulmonary:      Effort: Pulmonary effort is normal.      Breath sounds: Normal breath sounds.   Musculoskeletal: Normal range of motion.   Lymphadenopathy:      Cervical: No cervical adenopathy.   Skin:     General: Skin is warm and dry.      Capillary Refill: Capillary refill takes less than 2 seconds.   Neurological:      General: No focal deficit present.      Mental Status: He is alert.   Psychiatric:         Mood and Affect: Mood normal.         Behavior: Behavior normal.         Labs:  No results found for this or any previous visit (from the past 24 hour(s)).    X-Ray was not done.  EKG:  Sinus rhythm.  HR normal.      ASSESSMENT:      ICD-10-CM    1. Neck pain  M54.2 GASTROENTEROLOGY ADULT REF CONSULT ONLY   2. Chest pain, unspecified type  R07.9 EKG 12-lead complete w/read - Clinics     CARDIOLOGY EVAL ADULT REFERRAL        Medical Decision Making:    Differential Diagnosis:  Eosinophilic esophagitis, costochondritis.    Serious Comorbid Conditions:  Adult:  as above    PLAN:    Referral to GI and cardiology.  Discussed reasons to seek immediate medical attention.  Discussed getting a PCP.      Followup:    If not improving or if condition worsens, follow up with your Primary Care Provider, If not improving or if conditions worsens over the next 12-24  hours, go to the Emergency Department    Patient Instructions     Patient Education     Uncertain Causes of Chest Pain    Chest pain can happen for a number of reasons. Sometimes the cause can't be determined. If your condition does not seem serious, and your pain does not appear to be coming from your heart, your healthcare provider may recommend watching it closely. Sometimes the signs of a serious problem take more time to appear. Many problems not related to your heart can cause chest pain. These include:    Musculoskeletal. Costochondritis is an inflammation of the tissues around the ribs that can occur from trauma or overuse injuries, or a strain of the muscles of the chest wall    Respiratory. Pneumonia, collapsed lung (pneumothorax), or inflammation of the lining of the chest and lungs (pleurisy)    Gastrointestinal. Esophageal reflux, heartburn, ulcers, or gallbladder disease    Anxiety and panic disorders    Nerve compression and inflammation    Rare miscellaneous problems such as aortic aneurysm (a swelling of the large artery coming out of the heart) or pulmonary embolism (a blood clot in the lungs)  Home care  After your visit, follow these recommendations:    Rest today and avoid strenuous activity.    Take any prescribed medicine as directed.    Be aware of any recurrent chest pain and notice any changes  Follow-up care  Follow up with your healthcare provider if you do not start to feel better within 24 hours, or as advised.  Call 911  Call 911 if any of these occur:    A change in the type of pain: if it feels different, becomes more severe, lasts longer, or begins to spread into your shoulder, arm, neck, jaw or back    Shortness of breath or increased pain with breathing    Weakness, dizziness, or fainting    Rapid heart beat    Crushing sensation in your chest  When to seek medical advice  Call your healthcare provider right away if any of the following occur:    Cough with dark colored sputum  (phlegm) or blood    Fever of 100.4 F (38 C) or higher, or as directed by your healthcare provider    Swelling, pain or redness in one leg  Date Last Reviewed: 5/1/2018 2000-2019 [employer]. [address] [place] [address], [place] [address]. All rights reserved. This information is not intended as a substitute for professional medical care. Always follow your healthcare professional's instructions.

## 2020-09-15 ASSESSMENT — ENCOUNTER SYMPTOMS
SORE THROAT: 1
EXERCISE INTOLERANCE: 1
SMELL DISTURBANCE: 0
HYPERTENSION: 1
BOWEL INCONTINENCE: 0
TASTE DISTURBANCE: 0
JAUNDICE: 0
VOMITING: 0
SYNCOPE: 0
ORTHOPNEA: 0
HOARSE VOICE: 1
SINUS PAIN: 0
BLOATING: 0
HEARTBURN: 1
CONSTIPATION: 0
SINUS CONGESTION: 1
NAUSEA: 0
LEG PAIN: 0
LIGHT-HEADEDNESS: 1
ABDOMINAL PAIN: 0
HYPOTENSION: 0
NECK MASS: 0
SLEEP DISTURBANCES DUE TO BREATHING: 0
TROUBLE SWALLOWING: 0
BLOOD IN STOOL: 0
RECTAL PAIN: 0
DIARRHEA: 0
PALPITATIONS: 0

## 2020-09-15 NOTE — TELEPHONE ENCOUNTER
RECORDS RECEIVED FROM: Internal   DATE RECEIVED: 9-16   NOTES STATUS DETAILS   OFFICE NOTE from referring provider    Internal D Zeeshan Bonilla  9-9-20   OFFICE NOTE from other cardiologist    N/A    DISCHARGE SUMMARY from hospital    N/A    DISCHARGE REPORT from the ER   Internal 10-30-18   OPERATIVE REPORT    N/A    MEDICATION LIST   Internal    LABS     BMP   Internal 2-1-19   CBC   Internal 2-1-20   CMP   Internal 2-1-20   Lipids   Internal 2-1-19   TSH   N/A    DIAGNOSTIC PROCEDURES     EKG   Internal 9-9-20   Monitor Reports   N/A    IMAGING (DISC & REPORT)      Echo   N/A    Stress Tests   N/A    Cath   N/A    MRI/MRA   N/A    CT/CTA   N/A

## 2020-09-16 ENCOUNTER — PRE VISIT (OUTPATIENT)
Dept: CARDIOLOGY | Facility: CLINIC | Age: 50
End: 2020-09-16

## 2020-09-16 ENCOUNTER — OFFICE VISIT (OUTPATIENT)
Dept: CARDIOLOGY | Facility: CLINIC | Age: 50
End: 2020-09-16
Attending: PHYSICIAN ASSISTANT
Payer: COMMERCIAL

## 2020-09-16 VITALS
HEART RATE: 69 BPM | SYSTOLIC BLOOD PRESSURE: 123 MMHG | DIASTOLIC BLOOD PRESSURE: 86 MMHG | BODY MASS INDEX: 28.79 KG/M2 | WEIGHT: 190 LBS | HEIGHT: 68 IN | OXYGEN SATURATION: 99 %

## 2020-09-16 DIAGNOSIS — R07.9 CHEST PAIN, UNSPECIFIED TYPE: ICD-10-CM

## 2020-09-16 PROCEDURE — G0463 HOSPITAL OUTPT CLINIC VISIT: HCPCS | Mod: ZF

## 2020-09-16 PROCEDURE — 99204 OFFICE O/P NEW MOD 45 MIN: CPT | Mod: ZP | Performed by: INTERNAL MEDICINE

## 2020-09-16 RX ORDER — ATORVASTATIN CALCIUM 20 MG/1
20 TABLET, FILM COATED ORAL DAILY
Qty: 90 TABLET | Refills: 3 | Status: SHIPPED | OUTPATIENT
Start: 2020-09-16 | End: 2021-09-01

## 2020-09-16 ASSESSMENT — MIFFLIN-ST. JEOR: SCORE: 1686.33

## 2020-09-16 ASSESSMENT — PAIN SCALES - GENERAL: PAINLEVEL: NO PAIN (0)

## 2020-09-16 NOTE — PROGRESS NOTES
CARDIOLOGY NEW OFFICE VISIT    HPI: Gregory Aguilera is a 50 year old male being seen today for evaluation of atypical angina.   The patient's risk factor profile is: (+) HTN, (-) DM, (+) hypercholesterolemia, (-) tobacco use, (-) fam Hx premature CAD.  He was previously a patient of Dr Morse and presented to him in 2015 with angina and underwent a stress test which was normal. Since then he has been doing well. He has noticed over the last few years chest pain with lifting heavy objects. Denies other exertional angina with walking or biking or climbing stairs. He has noticed this pain to be worse in the last 8 months. He is establishing a new PCP this week.  Family history is remarkable for his dad with stents placed in his early 60s. He otherwise has no family history.     His LDL has been rising since 2015 when it was normal and is now in the 150s.    The patient denies a history of dyspnea, PND, orthopnea, pedal edema, palpitations, lightheadedness, and syncope.      PAST MEDICAL HISTORY:  Past Medical History:   Diagnosis Date     Adhesive capsulitis of left shoulder 7/1/2020     Allergic rhinitis, cause unspecified     Allergic rhinitis     Essential hypertension, benign      History of blood transfusion      Other chronic allergic conjunctivitis     Allergic conjunctivitis     Tubular adenoma x3 09/20/2019    Surveillance colonoscopy recommended 9/2022       CURRENT MEDICATIONS:  Current Outpatient Medications   Medication Sig Dispense Refill     acetaminophen (TYLENOL) 325 MG tablet Take 325-650 mg by mouth every 6 hours as needed for mild pain       Alum Hydroxide-Mag Carbonate (GAVISCON PO) Take 1 tablet by mouth as needed UK version       aspirin (ASA) 81 MG EC tablet Take 1 tablet (81 mg) by mouth daily       atorvastatin (LIPITOR) 20 MG tablet Take 1 tablet (20 mg) by mouth daily 90 tablet 3     diclofenac (VOLTAREN) 50 MG EC tablet Take 1 tablet (50 mg) by mouth 2 times daily 45 tablet 0      Esomeprazole Magnesium (NEXIUM PO) Take 40 mg by mouth 2 times daily       fluticasone (FLOVENT DISKUS) 50 MCG/BLIST AEPB Inhale 1 puff into the lungs daily as needed        ibuprofen (ADVIL/MOTRIN) 200 MG tablet Take 400 mg by mouth every 4 hours as needed for mild pain       lisinopril (ZESTRIL) 20 MG tablet TAKE 1 TABLET (20 MG) BY MOUTH DAILY 90 tablet 3     loratadine (CLARITIN REDITABS) 10 MG dispersible tablet Take 10 mg by mouth as needed        Montelukast Sodium (SINGULAIR PO) Take 10 mg by mouth as needed        NORTRIPTYLINE HCL PO Take 25 mg by mouth At Bedtime       Probiotic Product (PROBIOTIC DAILY PO) Take 1 capsule by mouth as needed       Wheat Dextrin (BENEFIBER DRINK MIX PO) Take by mouth as needed       cyclobenzaprine (FLEXERIL) 10 MG tablet Take 10 mg by mouth         PAST SURGICAL HISTORY:  Past Surgical History:   Procedure Laterality Date     CLOSED REDUCTION, PERCUTANEOUS PINNING FINGER, COMBINED  5/25/2012    Procedure:COMBINED CLOSED REDUCTION, PERCUTANEOUS PINNING FINGER; Closed reductionand fixation by pinning of Right Thumb fracture       COLONOSCOPY N/A 8/25/2014    Procedure: COMBINED COLONOSCOPY, SINGLE BIOPSY/POLYPECTOMY BY BIOPSY;  Surgeon: Misael Jarvis MD;  Location: [facility]     COLONOSCOPY N/A 9/20/2019    Procedure: COLONOSCOPY, WITH POLYPECTOMY AND BIOPSY;  Surgeon: Fred Edge MD;  [facility]     DENTAL SURGERY       ESOPHAGOSCOPY, GASTROSCOPY, DUODENOSCOPY (EGD), COMBINED  6/17/2013    Procedure: COMBINED ESOPHAGOSCOPY, GASTROSCOPY, DUODENOSCOPY (EGD), BIOPSY SINGLE OR MULTIPLE;;  Surgeon: Benjy Marinelli MD;  Location: [facility]     ESOPHAGOSCOPY, GASTROSCOPY, DUODENOSCOPY (EGD), COMBINED N/A 8/25/2014    Procedure: COMBINED ESOPHAGOSCOPY, GASTROSCOPY, DUODENOSCOPY (EGD), BIOPSY SINGLE OR MULTIPLE;  Surgeon: Misael Jarvis MD;  Location: [facility]     LASIK BILATERAL       thumb surgery       wisdom teeth removed  8-2014       ALLERGIES  Dust  mites; Grass; Mold; No known drug allergies; and Trees    FAMILY HX:  Family History   Problem Relation Age of Onset     Breast Cancer Mother      Arthritis Father      Genitourinary Problems Father         stones     Cancer Father         skin     C.A.D. Father         stents/age late 60s     Coronary Artery Disease Father         Stents     Prostate Cancer Father         late 50s     Asthma Sister      Depression Sister      Cancer Paternal Grandfather         ? type     Eye Disorder Paternal Grandfather         cateracts       SOCIAL HX:  Social History     Socioeconomic History     Marital status:      Spouse name: Not on file     Number of children: Not on file     Years of education: Not on file     Highest education level: Not on file   Occupational History     Not on file   Social Needs     Financial resource strain: Not on file     Food insecurity     Worry: Not on file     Inability: Not on file     Transportation needs     Medical: Not on file     Non-medical: Not on file   Tobacco Use     Smoking status: Never Smoker     Smokeless tobacco: Never Used   Substance and Sexual Activity     Alcohol use: Yes     Comment: couple drinks a week     Drug use: No     Sexual activity: Yes     Partners: Female     Birth control/protection: Condom   Lifestyle     Physical activity     Days per week: Not on file     Minutes per session: Not on file     Stress: Not on file   Relationships     Social connections     Talks on phone: Not on file     Gets together: Not on file     Attends Gnosticism service: Not on file     Active member of club or organization: Not on file     Attends meetings of clubs or organizations: Not on file     Relationship status: Not on file     Intimate partner violence     Fear of current or ex partner: Not on file     Emotionally abused: Not on file     Physically abused: Not on file     Forced sexual activity: Not on file   Other Topics Concern     Parent/sibling w/ CABG, MI or  "angioplasty before 65F 55M? No   Social History Narrative    Dairy/d 2-3 servings/d     Caffeine 1-2 servings/d    Exercise 2 x week-gym regularly treadmill/hockey    Sunscreen used - No    Seatbelts used - Yes    Working smoke/CO detectors in the home - Yes    Guns stored in the home - No    Self Breast Exams - NA    Self Testicular Exam - yes    Eye Exam up to date - Yes   2008    Dental Exam up to date - Yes  2008    Pap Smear up to date - NA    Mammogram up to date - NA    PSA up to date - No    Dexa Scan up to date - NA    Flex Sig / Colonoscopy up to date - No    Immunizations up to date - 2006 td    Abuse: Current or Past(Physical, Sexual or Emotional)- No    Do you feel safe in your environment - Yes    Updated 5/2009       ROS:  Constitutional: No fever, chills, or sweats. No weight gain/loss.   ENT: No visual disturbance, ear ache, epistaxis, sore throat.   Allergies/Immunologic: Negative.   Respiratory: No cough, hemoptysis.   Cardiovascular: As per HPI.   GI: No nausea, vomiting, hematemesis, melena, or hematochezia.   : No urinary frequency, dysuria, or hematuria.   Integument: Negative.   Psychiatric: Negative.   Neuro: Negative.   Endocrinology: Negative.   Musculoskeletal: No myalgia.    VITAL SIGNS:  /86 (BP Location: Right arm, Patient Position: Chair, Cuff Size: Adult Regular)   Pulse 69   Ht 1.727 m (5' 8\")   Wt 86.2 kg (190 lb)   SpO2 99%   BMI 28.89 kg/m    Body mass index is 28.89 kg/m .  Wt Readings from Last 2 Encounters:   09/16/20 86.2 kg (190 lb)   09/09/20 83.9 kg (185 lb)       PHYSICAL EXAM  Gregory Aguilera is a 50 year old male in no acute distress.  HEENT: Unremarkable.  Neck: JVP normal.  Carotids +4/4 bilaterally without bruits.  Lungs: CTA.  Cor: RRR. Normal S1 and S2.  No murmur, rub, or gallop.  PMI in Lf 5th ICS.  Abd: Soft, nontender, nondistended.  NABS.  No pulsatile mass.  Extremities: No C/C/E.  Pulses +4/4 symmetric in upper and lower extremities.  Neuro: " Grossly intact.    LABS    Lab Results   Component Value Date    WBC 4.9 2020     Lab Results   Component Value Date    RBC 5.42 2020     Lab Results   Component Value Date    HGB 16.4 2020     Lab Results   Component Value Date    HCT 47.5 2020     No components found for: MCT  Lab Results   Component Value Date    MCV 88 2020     Lab Results   Component Value Date    MCH 30.3 2020     Lab Results   Component Value Date    MCHC 34.5 2020     Lab Results   Component Value Date    RDW 12.1 2020     Lab Results   Component Value Date     2020      Recent Labs   Lab Test 20  1251 19  1425    138   POTASSIUM 3.9 3.8   CHLORIDE 104 105   CO2 29 26   ANIONGAP 6 7   * 99   BUN 16 16   CR 1.18 1.06   PAPA 8.7 9.2     Recent Labs   Lab Test 19  1425 18  1035  02/18/15  1538 14  1019   CHOL 238* 198   < > 210* 179   HDL 44 40   < > 48 42   * 102*   < > 120 97   TRIG 197* 280*   < > 212* 203*   CHOLHDLRATIO  --   --   --  4.4 4.3   NHDL 194* 158*   < >  --   --     < > = values in this interval not displayed.        EK20  Sinus rhythm, right axis deviation this is unchanged from his prior    ECHO:     STRESS TEST:  2015  Interpretation Summary  Normal resting LV function with EF of approximately 60-65%; normal response  to exercise with increase to approximately 70-75%. No stress induced regional  wall motion abnormalities. No ECG evidence of ischemia. No subjective  evidence of ischemia. No significant valvular disease noted on routine  screening color flow Doppler and pulsed Doppler examination. Normal  functional capacity.    CARDIAC CATH:  --    ASSESSMENT AND PLAN:    1. Atypical angina,  -- coronary CT angiogram  -- start aspirin 81 mg PO daily    2. Hyperlipidemia, not controlled, ASCVD risk of 6.2%  -- start 20 mg of atorvastatin    3. Hypertension, well controlled  -- continue ACEI    RTC pending  coronary CT    Rashawn Jerome MD

## 2020-09-16 NOTE — NURSING NOTE
Cardiac Testing: Patient given instructions regarding  Coronary CTA. Discussed purpose, preparation, procedure and when to expect results reported back to the patient. Patient demonstrated understanding of this information and agreed to call with further questions or concerns.  Med Reconcile: Reviewed and verified all current medications with the patient. The updated medication list was printed and given to the patient.  New Medication: Patient was educated regarding newly prescribed medication, including discussion of  the indication, administration, side effects, and when to report to MD or RN. Patient demonstrated understanding of this information and agreed to call with further questions or concerns.  Patient stated he understood all health information given and agreed to call with further questions or concerns.

## 2020-09-16 NOTE — LETTER
9/16/2020      RE: Gregory Aguilera  [address] [facility] [address]       Dear Colleague,    Thank you for the opportunity to participate in the care of your patient, Gregory Aguilera, at the [facility] at [facility]. Please see a copy of my visit note below.    CARDIOLOGY NEW OFFICE VISIT    HPI: Gregory Aguilera is a 50 year old male being seen today for evaluation of atypical angina.   The patient's risk factor profile is: (+) HTN, (-) DM, (+) hypercholesterolemia, (-) tobacco use, (-) fam Hx premature CAD.  He was previously a patient of Dr Morse and presented to him in 2015 with angina and underwent a stress test which was normal. Since then he has been doing well. He has noticed over the last few years chest pain with lifting heavy objects. Denies other exertional angina with walking or biking or climbing stairs. He has noticed this pain to be worse in the last 8 months. He is establishing a new PCP this week.  Family history is remarkable for his dad with stents placed in his early 60s. He otherwise has no family history.     His LDL has been rising since 2015 when it was normal and is now in the 150s.    The patient denies a history of dyspnea, PND, orthopnea, pedal edema, palpitations, lightheadedness, and syncope.      PAST MEDICAL HISTORY:  Past Medical History:   Diagnosis Date     Adhesive capsulitis of left shoulder 7/1/2020     Allergic rhinitis, cause unspecified     Allergic rhinitis     Essential hypertension, benign      History of blood transfusion      Other chronic allergic conjunctivitis     Allergic conjunctivitis     Tubular adenoma x3 09/20/2019    Surveillance colonoscopy recommended 9/2022       CURRENT MEDICATIONS:  Current Outpatient Medications   Medication Sig Dispense Refill     acetaminophen (TYLENOL) 325 MG tablet Take 325-650 mg by mouth every 6 hours as needed for mild pain       Alum Hydroxide-Mag Carbonate (GAVISCON  PO) Take 1 tablet by mouth as needed UK version       aspirin (ASA) 81 MG EC tablet Take 1 tablet (81 mg) by mouth daily       atorvastatin (LIPITOR) 20 MG tablet Take 1 tablet (20 mg) by mouth daily 90 tablet 3     diclofenac (VOLTAREN) 50 MG EC tablet Take 1 tablet (50 mg) by mouth 2 times daily 45 tablet 0     Esomeprazole Magnesium (NEXIUM PO) Take 40 mg by mouth 2 times daily       fluticasone (FLOVENT DISKUS) 50 MCG/BLIST AEPB Inhale 1 puff into the lungs daily as needed        ibuprofen (ADVIL/MOTRIN) 200 MG tablet Take 400 mg by mouth every 4 hours as needed for mild pain       lisinopril (ZESTRIL) 20 MG tablet TAKE 1 TABLET (20 MG) BY MOUTH DAILY 90 tablet 3     loratadine (CLARITIN REDITABS) 10 MG dispersible tablet Take 10 mg by mouth as needed        Montelukast Sodium (SINGULAIR PO) Take 10 mg by mouth as needed        NORTRIPTYLINE HCL PO Take 25 mg by mouth At Bedtime       Probiotic Product (PROBIOTIC DAILY PO) Take 1 capsule by mouth as needed       Wheat Dextrin (BENEFIBER DRINK MIX PO) Take by mouth as needed       cyclobenzaprine (FLEXERIL) 10 MG tablet Take 10 mg by mouth         PAST SURGICAL HISTORY:  Past Surgical History:   Procedure Laterality Date     CLOSED REDUCTION, PERCUTANEOUS PINNING FINGER, COMBINED  5/25/2012    Procedure:COMBINED CLOSED REDUCTION, PERCUTANEOUS PINNING FINGER; Closed reductionand fixation by pinning of Right Thumb fracture       COLONOSCOPY N/A 8/25/2014    Procedure: COMBINED COLONOSCOPY, SINGLE BIOPSY/POLYPECTOMY BY BIOPSY;  Surgeon: Misael Jarvis MD;  Location: [facility]     COLONOSCOPY N/A 9/20/2019    Procedure: COLONOSCOPY, WITH POLYPECTOMY AND BIOPSY;  Surgeon: Fred Edge MD;  [facility]     DENTAL SURGERY       ESOPHAGOSCOPY, GASTROSCOPY, DUODENOSCOPY (EGD), COMBINED  6/17/2013    Procedure: COMBINED ESOPHAGOSCOPY, GASTROSCOPY, DUODENOSCOPY (EGD), BIOPSY SINGLE OR MULTIPLE;;  Surgeon: Benjy Marinelli MD;  [facility]      ESOPHAGOSCOPY, GASTROSCOPY, DUODENOSCOPY (EGD), COMBINED N/A 8/25/2014    Procedure: COMBINED ESOPHAGOSCOPY, GASTROSCOPY, DUODENOSCOPY (EGD), BIOPSY SINGLE OR MULTIPLE;  Surgeon: Misael Jarvis MD;  [facility]     LASIK BILATERAL       thumb surgery       wisdom teeth removed  8-2014       ALLERGIES  Dust mites; Grass; Mold; No known drug allergies; and Trees    FAMILY HX:  Family History   Problem Relation Age of Onset     Breast Cancer Mother      Arthritis Father      Genitourinary Problems Father         stones     Cancer Father         skin     C.A.D. Father         stents/age late 60s     Coronary Artery Disease Father         Stents     Prostate Cancer Father         late 50s     Asthma Sister      Depression Sister      Cancer Paternal Grandfather         ? type     Eye Disorder Paternal Grandfather         cateracts       SOCIAL HX:  Social History     Socioeconomic History     Marital status:      Spouse name: Not on file     Number of children: Not on file     Years of education: Not on file     Highest education level: Not on file   Occupational History     Not on file   Social Needs     Financial resource strain: Not on file     Food insecurity     Worry: Not on file     Inability: Not on file     Transportation needs     Medical: Not on file     Non-medical: Not on file   Tobacco Use     Smoking status: Never Smoker     Smokeless tobacco: Never Used   Substance and Sexual Activity     Alcohol use: Yes     Comment: couple drinks a week     Drug use: No     Sexual activity: Yes     Partners: Female     Birth control/protection: Condom   Lifestyle     Physical activity     Days per week: Not on file     Minutes per session: Not on file     Stress: Not on file   Relationships     Social connections     Talks on phone: Not on file     Gets together: Not on file     Attends Yazidism service: Not on file     Active member of club or organization: Not on file     Attends meetings of clubs  "or organizations: Not on file     Relationship status: Not on file     Intimate partner violence     Fear of current or ex partner: Not on file     Emotionally abused: Not on file     Physically abused: Not on file     Forced sexual activity: Not on file   Other Topics Concern     Parent/sibling w/ CABG, MI or angioplasty before 65F 55M? No   Social History Narrative    Dairy/d 2-3 servings/d     Caffeine 1-2 servings/d    Exercise 2 x week-gym regularly treadmill/hockey    Sunscreen used - No    Seatbelts used - Yes    Working smoke/CO detectors in the home - Yes    Guns stored in the home - No    Self Breast Exams - NA    Self Testicular Exam - yes    Eye Exam up to date - Yes   2008    Dental Exam up to date - Yes  2008    Pap Smear up to date - NA    Mammogram up to date - NA    PSA up to date - No    Dexa Scan up to date - NA    Flex Sig / Colonoscopy up to date - No    Immunizations up to date - 2006 td    Abuse: Current or Past(Physical, Sexual or Emotional)- No    Do you feel safe in your environment - Yes    Updated 5/2009       ROS:  Constitutional: No fever, chills, or sweats. No weight gain/loss.   ENT: No visual disturbance, ear ache, epistaxis, sore throat.   Allergies/Immunologic: Negative.   Respiratory: No cough, hemoptysis.   Cardiovascular: As per HPI.   GI: No nausea, vomiting, hematemesis, melena, or hematochezia.   : No urinary frequency, dysuria, or hematuria.   Integument: Negative.   Psychiatric: Negative.   Neuro: Negative.   Endocrinology: Negative.   Musculoskeletal: No myalgia.    VITAL SIGNS:  /86 (BP Location: Right arm, Patient Position: Chair, Cuff Size: Adult Regular)   Pulse 69   Ht 1.727 m (5' 8\")   Wt 86.2 kg (190 lb)   SpO2 99%   BMI 28.89 kg/m    Body mass index is 28.89 kg/m .  Wt Readings from Last 2 Encounters:   09/16/20 86.2 kg (190 lb)   09/09/20 83.9 kg (185 lb)       PHYSICAL EXAM  Gregory Aguilera is a 50 year old male in no acute distress.  HEENT: " Unremarkable.  Neck: JVP normal.  Carotids +4/4 bilaterally without bruits.  Lungs: CTA.  Cor: RRR. Normal S1 and S2.  No murmur, rub, or gallop.  PMI in Lf 5th ICS.  Abd: Soft, nontender, nondistended.  NABS.  No pulsatile mass.  Extremities: No C/C/E.  Pulses +4/4 symmetric in upper and lower extremities.  Neuro: Grossly intact.    LABS    Lab Results   Component Value Date    WBC 4.9 2020     Lab Results   Component Value Date    RBC 5.42 2020     Lab Results   Component Value Date    HGB 16.4 2020     Lab Results   Component Value Date    HCT 47.5 2020     No components found for: MCT  Lab Results   Component Value Date    MCV 88 2020     Lab Results   Component Value Date    MCH 30.3 2020     Lab Results   Component Value Date    MCHC 34.5 2020     Lab Results   Component Value Date    RDW 12.1 2020     Lab Results   Component Value Date     2020      Recent Labs   Lab Test 20  1251 19  1425    138   POTASSIUM 3.9 3.8   CHLORIDE 104 105   CO2 29 26   ANIONGAP 6 7   * 99   BUN 16 16   CR 1.18 1.06   PAPA 8.7 9.2     Recent Labs   Lab Test 19  1425 18  1035  02/18/15  1538 14  1019   CHOL 238* 198   < > 210* 179   HDL 44 40   < > 48 42   * 102*   < > 120 97   TRIG 197* 280*   < > 212* 203*   CHOLHDLRATIO  --   --   --  4.4 4.3   NHDL 194* 158*   < >  --   --     < > = values in this interval not displayed.        EK20  Sinus rhythm, right axis deviation this is unchanged from his prior    ECHO: 2015    STRESS TEST:  2015  Interpretation Summary  Normal resting LV function with EF of approximately 60-65%; normal response  to exercise with increase to approximately 70-75%. No stress induced regional  wall motion abnormalities. No ECG evidence of ischemia. No subjective  evidence of ischemia. No significant valvular disease noted on routine  screening color flow Doppler and pulsed Doppler  examination. Normal  functional capacity.    CARDIAC CATH:  --    ASSESSMENT AND PLAN:    1. Atypical angina,  -- coronary CT angiogram  -- start aspirin 81 mg PO daily    2. Hyperlipidemia, not controlled, ASCVD risk of 6.2%  -- start 20 mg of atorvastatin    3. Hypertension, well controlled  -- continue ACEI    RTC pending coronary CT        Please do not hesitate to contact me if you have any questions/concerns.     Sincerely,     Rashawn Jerome MD

## 2020-09-16 NOTE — PATIENT INSTRUCTIONS
Patient Instructions:  It was a pleasure to see you in the cardiology clinic today.      If you have any questions, call  Amber Alvarado RN, at [phone].  Press Option #1 for the [facility], and then press Option #4  We are encouraging the use of [employer] to communicate with your HealthCare Provider    Note the new medications: baby aspirin 81 mg by mouth daily   Start Lipitor 20 mg by mouth daily  Stop the following medications: none    The results from today include: pending coronary CTA  Please follow up with phone call      If you have an urgent need after hours (8:00 am to 4:30 pm) please call [phone] and ask for the cardiology fellow on call.

## 2020-09-16 NOTE — NURSING NOTE
Chief Complaint   Patient presents with     New Patient      former Dr. Morse patient, chronic chest pain     Vitals were taken and medications were reconciled.     Adelina Mccabe  12:55 PM

## 2020-09-17 ENCOUNTER — HOSPITAL ENCOUNTER (OUTPATIENT)
Dept: CT IMAGING | Facility: CLINIC | Age: 50
Discharge: HOME | End: 2020-09-17
Attending: INTERNAL MEDICINE | Admitting: INTERNAL MEDICINE
Payer: COMMERCIAL

## 2020-09-17 VITALS
RESPIRATION RATE: 18 BRPM | SYSTOLIC BLOOD PRESSURE: 121 MMHG | DIASTOLIC BLOOD PRESSURE: 80 MMHG | HEART RATE: 59 BPM | OXYGEN SATURATION: 98 %

## 2020-09-17 DIAGNOSIS — R07.9 CHEST PAIN, UNSPECIFIED TYPE: ICD-10-CM

## 2020-09-17 PROCEDURE — 25000132 ZZH RX MED GY IP 250 OP 250 PS 637: Performed by: INTERNAL MEDICINE

## 2020-09-17 PROCEDURE — 75574 CT ANGIO HRT W/3D IMAGE: CPT

## 2020-09-17 PROCEDURE — 75574 CT ANGIO HRT W/3D IMAGE: CPT | Mod: 26 | Performed by: INTERNAL MEDICINE

## 2020-09-17 PROCEDURE — 25000128 H RX IP 250 OP 636: Performed by: INTERNAL MEDICINE

## 2020-09-17 RX ORDER — METHYLPREDNISOLONE SODIUM SUCCINATE 125 MG/2ML
125 INJECTION, POWDER, LYOPHILIZED, FOR SOLUTION INTRAMUSCULAR; INTRAVENOUS
Status: DISCONTINUED | OUTPATIENT
Start: 2020-09-17 | End: 2020-09-18 | Stop reason: HOSPADM

## 2020-09-17 RX ORDER — DIPHENHYDRAMINE HCL 25 MG
25 CAPSULE ORAL
Status: DISCONTINUED | OUTPATIENT
Start: 2020-09-17 | End: 2020-09-18 | Stop reason: HOSPADM

## 2020-09-17 RX ORDER — METOPROLOL TARTRATE 1 MG/ML
5-15 INJECTION, SOLUTION INTRAVENOUS
Status: DISCONTINUED | OUTPATIENT
Start: 2020-09-17 | End: 2020-09-18 | Stop reason: HOSPADM

## 2020-09-17 RX ORDER — IOPAMIDOL 755 MG/ML
120 INJECTION, SOLUTION INTRAVASCULAR ONCE
Status: COMPLETED | OUTPATIENT
Start: 2020-09-17 | End: 2020-09-17

## 2020-09-17 RX ORDER — ONDANSETRON 2 MG/ML
4 INJECTION INTRAMUSCULAR; INTRAVENOUS
Status: DISCONTINUED | OUTPATIENT
Start: 2020-09-17 | End: 2020-09-18 | Stop reason: HOSPADM

## 2020-09-17 RX ORDER — DIPHENHYDRAMINE HYDROCHLORIDE 50 MG/ML
25-50 INJECTION INTRAMUSCULAR; INTRAVENOUS
Status: DISCONTINUED | OUTPATIENT
Start: 2020-09-17 | End: 2020-09-18 | Stop reason: HOSPADM

## 2020-09-17 RX ORDER — NITROGLYCERIN 0.4 MG/1
.4-.8 TABLET SUBLINGUAL
Status: DISCONTINUED | OUTPATIENT
Start: 2020-09-17 | End: 2020-09-18 | Stop reason: HOSPADM

## 2020-09-17 RX ORDER — IVABRADINE 5 MG/1
5-15 TABLET, FILM COATED ORAL
Status: DISCONTINUED | OUTPATIENT
Start: 2020-09-17 | End: 2020-09-18 | Stop reason: HOSPADM

## 2020-09-17 RX ORDER — METOPROLOL TARTRATE 25 MG/1
25-100 TABLET, FILM COATED ORAL
Status: COMPLETED | OUTPATIENT
Start: 2020-09-17 | End: 2020-09-17

## 2020-09-17 RX ORDER — ACYCLOVIR 200 MG/1
0-1 CAPSULE ORAL
Status: DISCONTINUED | OUTPATIENT
Start: 2020-09-17 | End: 2020-09-18 | Stop reason: HOSPADM

## 2020-09-17 RX ADMIN — METOPROLOL TARTRATE 100 MG: 100 TABLET, FILM COATED ORAL at 08:13

## 2020-09-17 RX ADMIN — IOPAMIDOL 115 ML: 755 INJECTION, SOLUTION INTRAVENOUS at 09:03

## 2020-09-17 RX ADMIN — NITROGLYCERIN 0.8 MG: 0.4 TABLET SUBLINGUAL at 09:04

## 2020-09-17 ASSESSMENT — ENCOUNTER SYMPTOMS
FEVER: 0
DIZZINESS: 1
DIARRHEA: 0
COUGH: 0
FREQUENCY: 0
MYALGIAS: 0
PALPITATIONS: 0
JOINT SWELLING: 0
HEARTBURN: 1
WEAKNESS: 0
SORE THROAT: 0
HEMATOCHEZIA: 0
DYSURIA: 0
NAUSEA: 0
PARESTHESIAS: 0
EYE PAIN: 0
HEMATURIA: 0
HEADACHES: 0
CONSTIPATION: 0
NERVOUS/ANXIOUS: 0
ARTHRALGIAS: 0
CHILLS: 0
ABDOMINAL PAIN: 0

## 2020-09-17 NOTE — PROGRESS NOTES
Pt arrived for Coronary CT angiogram. Test, meds and side effects reviewed with pt.  Resting HR  73 bpm. Given 100 mg PO Metoprolol per verbal order. Administered 0.8 mg SL nitro on CTA table per order. CTA completed.  Patient tolerated procedure well and denies symptoms of allergic reaction.  Post monitoring completed and VSS.  D/C instructions reviewed with pt whom verbalized understanding of need to increase PO fluids today. D/C to gold waiting room accompanied by staff.

## 2020-09-18 ENCOUNTER — OFFICE VISIT (OUTPATIENT)
Dept: FAMILY MEDICINE | Facility: CLINIC | Age: 50
End: 2020-09-18
Payer: COMMERCIAL

## 2020-09-18 VITALS
WEIGHT: 189.2 LBS | OXYGEN SATURATION: 98 % | RESPIRATION RATE: 16 BRPM | TEMPERATURE: 98.4 F | HEIGHT: 68 IN | BODY MASS INDEX: 28.67 KG/M2 | HEART RATE: 64 BPM | DIASTOLIC BLOOD PRESSURE: 79 MMHG | SYSTOLIC BLOOD PRESSURE: 121 MMHG

## 2020-09-18 DIAGNOSIS — E78.5 HYPERLIPIDEMIA LDL GOAL <100: ICD-10-CM

## 2020-09-18 DIAGNOSIS — J30.9 ALLERGIC RHINITIS, UNSPECIFIED SEASONALITY, UNSPECIFIED TRIGGER: ICD-10-CM

## 2020-09-18 DIAGNOSIS — M79.661 RIGHT CALF PAIN: ICD-10-CM

## 2020-09-18 DIAGNOSIS — Z11.4 SCREENING FOR HIV (HUMAN IMMUNODEFICIENCY VIRUS): ICD-10-CM

## 2020-09-18 DIAGNOSIS — Z00.00 ROUTINE GENERAL MEDICAL EXAMINATION AT A HEALTH CARE FACILITY: Primary | ICD-10-CM

## 2020-09-18 DIAGNOSIS — K21.9 GASTROESOPHAGEAL REFLUX DISEASE, ESOPHAGITIS PRESENCE NOT SPECIFIED: ICD-10-CM

## 2020-09-18 PROCEDURE — 80053 COMPREHEN METABOLIC PANEL: CPT | Performed by: PHYSICIAN ASSISTANT

## 2020-09-18 PROCEDURE — 90750 HZV VACC RECOMBINANT IM: CPT | Performed by: PHYSICIAN ASSISTANT

## 2020-09-18 PROCEDURE — G0103 PSA SCREENING: HCPCS | Performed by: PHYSICIAN ASSISTANT

## 2020-09-18 PROCEDURE — 90471 IMMUNIZATION ADMIN: CPT | Performed by: PHYSICIAN ASSISTANT

## 2020-09-18 PROCEDURE — 99396 PREV VISIT EST AGE 40-64: CPT | Mod: 25 | Performed by: PHYSICIAN ASSISTANT

## 2020-09-18 PROCEDURE — 80061 LIPID PANEL: CPT | Performed by: PHYSICIAN ASSISTANT

## 2020-09-18 PROCEDURE — 36415 COLL VENOUS BLD VENIPUNCTURE: CPT | Performed by: PHYSICIAN ASSISTANT

## 2020-09-18 PROCEDURE — 87389 HIV-1 AG W/HIV-1&-2 AB AG IA: CPT | Performed by: PHYSICIAN ASSISTANT

## 2020-09-18 ASSESSMENT — ENCOUNTER SYMPTOMS
NERVOUS/ANXIOUS: 0
NAUSEA: 0
ARTHRALGIAS: 0
DIZZINESS: 1
MYALGIAS: 0
HEMATOCHEZIA: 0
HEARTBURN: 1
FEVER: 0
CONSTIPATION: 0
COUGH: 0
HEMATURIA: 0
DIARRHEA: 0
PARESTHESIAS: 0
PALPITATIONS: 0
HEADACHES: 0
ABDOMINAL PAIN: 0
FREQUENCY: 0
DYSURIA: 0
JOINT SWELLING: 0
CHILLS: 0
EYE PAIN: 0
WEAKNESS: 0
SORE THROAT: 0

## 2020-09-18 ASSESSMENT — MIFFLIN-ST. JEOR: SCORE: 1674.77

## 2020-09-18 NOTE — PROGRESS NOTES
SUBJECTIVE:   CC: Gregory Aguilera is an 50 year old male who presents for preventative health visit.     Healthy Habits:     Getting at least 3 servings of Calcium per day:  NO    Bi-annual eye exam:  Yes    Dental care twice a year:  Yes    Sleep apnea or symptoms of sleep apnea:  Daytime drowsiness and Excessive snoring    Diet:  Regular (no restrictions)    Frequency of exercise:  4-5 days/week    Duration of exercise:  30-45 minutes    Taking medications regularly:  Yes    Medication side effects:  None    PHQ-2 Total Score: 0    Additional concerns today:  Yes    Hemant is here for routine health physical     He reports he started taking Lipitor 20 mg and ASA 81 mg daily this week. He has a history of hypertension and high cholesterol. He follows with cardiology. He takes Lisinopril 20 mg daily to manage blood pressure.     He also has a history of eosinophilic esophagitis. Not well controlled at present. Takes Omeprazole 40 mg daily not sure if this is helping anymore. He has follow up with GI in early October. He has noticed some throat discomfort. Unsure if related to this history. Does have seasonal allergies. In the past has taken Flonase and Claritin.     His right calf muscle has been bothering him. He describes it as feeling tired. No swelling or redness. No blood clot history. He walks 30-45 minutes with his wife for exercise.     HM: Will be getting flu shot at work       Today's PHQ-2 Score:   PHQ-2 ( 1999 Pf[employer]) 9/17/2020   Q1: Little interest or pleasure in doing things 0   Q2: Feeling down, depressed or hopeless 0   PHQ-2 Score 0   Q1: Little interest or pleasure in doing things Not at all   Q2: Feeling down, depressed or hopeless Not at all   PHQ-2 Score 0       Abuse: Current or Past(Physical, Sexual or Emotional)- No  Do you feel safe in your environment? Yes        Social History     Tobacco Use     Smoking status: Never Smoker     Smokeless tobacco: Never Used   Substance Use Topics      "Alcohol use: Yes     Comment: couple drinks a week         Alcohol Use 9/17/2020   Prescreen: >3 drinks/day or >7 drinks/week? No   Prescreen: >3 drinks/day or >7 drinks/week? -       Last PSA:   PSA   Date Value Ref Range Status   09/18/2020 0.71 0 - 4 ug/L Final     Comment:     Assay Method:  Chemiluminescence using Siemens Vista analyzer       Reviewed orders with patient. Reviewed health maintenance and updated orders accordingly - Yes      Reviewed and updated as needed this visit by clinical staff  Tobacco  Allergies  Meds  Med Hx  Surg Hx  Fam Hx  Soc Hx        Reviewed and updated as needed this visit by Provider            Review of Systems   Constitutional: Negative for chills and fever.   HENT: Negative for congestion, ear pain, hearing loss and sore throat.    Eyes: Negative for pain and visual disturbance.   Respiratory: Negative for cough.    Cardiovascular: Negative for chest pain, palpitations and peripheral edema.   Gastrointestinal: Positive for heartburn. Negative for abdominal pain, constipation, diarrhea, hematochezia and nausea.   Genitourinary: Negative for discharge, dysuria, frequency, genital sores, hematuria, impotence and urgency.   Musculoskeletal: Negative for arthralgias, joint swelling and myalgias.   Skin: Negative for rash.   Neurological: Positive for dizziness. Negative for weakness, headaches and paresthesias.   Psychiatric/Behavioral: Negative for mood changes. The patient is not nervous/anxious.          OBJECTIVE:   /79   Pulse 64   Temp 98.4  F (36.9  C) (Oral)   Resp 16   Ht 1.715 m (5' 7.5\")   Wt 85.8 kg (189 lb 3.2 oz)   SpO2 98%   BMI 29.20 kg/m      Physical Exam  GENERAL: healthy, alert and no distress  EYES: Eyes grossly normal to inspection, PERRL and conjunctivae and sclerae normal  HENT: ear canals and TM's normal, nose and mouth without ulcers or lesions  NECK: no adenopathy, no asymmetry, masses, or scars and thyroid normal to palpation  RESP: " lungs clear to auscultation - no rales, rhonchi or wheezes  CV: regular rate and rhythm, normal S1 S2, no S3 or S4, no murmur, click or rub, no peripheral edema and peripheral pulses strong  ABDOMEN: soft, nontender, no hepatosplenomegaly, no masses and bowel sounds normal  MS: no gross musculoskeletal defects noted, no edema. Right and left calf muscles symmetrical. No redness, swelling, or tenderness to right calf. Normal strength.   SKIN: no suspicious lesions or rashes  NEURO: Normal strength and tone, mentation intact and speech normal  PSYCH: mentation appears normal, affect normal/bright    Diagnostic Test Results:  Labs reviewed in Epic  Results for orders placed or performed in visit on 09/18/20   PSA, screen     Status: None   Result Value Ref Range    PSA 0.71 0 - 4 ug/L   Lipid panel reflex to direct LDL Non-fasting     Status: Abnormal   Result Value Ref Range    Cholesterol 159 <200 mg/dL    Triglycerides 308 (H) <150 mg/dL    HDL Cholesterol 36 (L) >39 mg/dL    LDL Cholesterol Calculated 61 <100 mg/dL    Non HDL Cholesterol 123 <130 mg/dL   Comprehensive metabolic panel (BMP + Alb, Alk Phos, ALT, AST, Total. Bili, TP)     Status: None   Result Value Ref Range    Sodium 140 133 - 144 mmol/L    Potassium 3.8 3.4 - 5.3 mmol/L    Chloride 107 94 - 109 mmol/L    Carbon Dioxide 23 20 - 32 mmol/L    Anion Gap 10 3 - 14 mmol/L    Glucose 91 70 - 99 mg/dL    Urea Nitrogen 15 7 - 30 mg/dL    Creatinine 0.88 0.66 - 1.25 mg/dL    GFR Estimate >90 >60 mL/min/[1.73_m2]    GFR Estimate If Black >90 >60 mL/min/[1.73_m2]    Calcium 9.0 8.5 - 10.1 mg/dL    Bilirubin Total 0.4 0.2 - 1.3 mg/dL    Albumin 4.1 3.4 - 5.0 g/dL    Protein Total 7.3 6.8 - 8.8 g/dL    Alkaline Phosphatase 63 40 - 150 U/L    ALT 23 0 - 70 U/L    AST 15 0 - 45 U/L       The 10-year ASCVD risk score (Artie JONES Jr., et al., 2013) is: 4.6%    Values used to calculate the score:      Age: 50 years      Sex: Male      Is Non- :  "No      Diabetic: No      Tobacco smoker: No      Systolic Blood Pressure: 121 mmHg      Is BP treated: Yes      HDL Cholesterol: 36 mg/dL      Total Cholesterol: 159 mg/dL    ASSESSMENT/PLAN:   Gregory was seen today for physical.    Diagnoses and all orders for this visit:    Routine general medical examination at a health care facility  -     HIV Antigen Antibody Combo  -     PSA, screen  -     ZOSTER VACCINE RECOMBINANT ADJUVANTED IM NJX  -     ADMIN 1st VACCINE  -     Comprehensive metabolic panel (BMP + Alb, Alk Phos, ALT, AST, Total. Bili, TP)    Screening for HIV (human immunodeficiency virus)    Hyperlipidemia LDL goal <100  -     Lipid panel reflex to direct LDL Non-fasting  -     Comprehensive metabolic panel (BMP + Alb, Alk Phos, ALT, AST, Total. Bili, TP)    Right calf pain    Allergic rhinitis, unspecified seasonality, unspecified trigger    Gastroesophageal reflux disease, esophagitis presence not specified      Right calf continue to monitor. Seems muscular in nature. Encouraged stretching before and after exercise. Rest, ice, heat, massage, tylenol as needed. Could be related to statin therapy. Exam did not raise concern for DVT.     Follow up with GI as planned for esophagitis. Consider starting Flonase and Claritin again for seasonal allergy symptoms.         COUNSELING:   Reviewed preventive health counseling, as reflected in patient instructions       Regular exercise       Healthy diet/nutrition       Immunizations    Declined: Influenza due to will get it at work                HIV screeninx in teen years, 1x in adult years, and at intervals if high risk       Prostate cancer screening    Estimated body mass index is 29.2 kg/m  as calculated from the following:    Height as of this encounter: 1.715 m (5' 7.5\").    Weight as of this encounter: 85.8 kg (189 lb 3.2 oz).     Weight management plan: Discussed healthy diet and exercise guidelines    He reports that he has never smoked. He has " never used smokeless tobacco.      Counseling Resources:  ATP IV Guidelines  Pooled Cohorts Equation Calculator  FRAX Risk Assessment  ICSI Preventive Guidelines  Dietary Guidelines for Americans, 2010  USDA's MyPlate  ASA Prophylaxis  Lung CA Screening    Kiet Watkins PA-C  [facility]

## 2020-09-18 NOTE — NURSING NOTE
Prior to immunization administration, verified patients identity using patient s name and date of birth. Please see Immunization Activity for additional information.     Screening Questionnaire for Adult Immunization    Are you sick today?   No   Do you have allergies to medications, food, a vaccine component or latex?   No   Have you ever had a serious reaction after receiving a vaccination?   No   Do you have a long-term health problem with heart, lung, kidney, or metabolic disease (e.g., diabetes), asthma, a blood disorder, no spleen, complement component deficiency, a cochlear implant, or a spinal fluid leak?  Are you on long-term aspirin therapy?   No   Do you have cancer, leukemia, HIV/AIDS, or any other immune system problem?   No   Do you have a parent, brother, or sister with an immune system problem?   No   In the past 3 months, have you taken medications that affect  your immune system, such as prednisone, other steroids, or anticancer drugs; drugs for the treatment of rheumatoid arthritis, Crohn s disease, or psoriasis; or have you had radiation treatments?   No   Have you had a seizure, or a brain or other nervous system problem?   No   During the past year, have you received a transfusion of blood or blood    products, or been given immune (gamma) globulin or antiviral drug?   No   For women: Are you pregnant or is there a chance you could become       pregnant during the next month?   No   Have you received any vaccinations in the past 4 weeks?   No     Immunization questionnaire answers were all negative.        Per orders of Dr. Watkins, injection of shingrix   given by Lucy Hamilton MA. Patient instructed to remain in clinic for 15 minutes afterwards, and to report any adverse reaction to me immediately.       Screening performed by Lucy Hamilton MA on 9/18/2020 at 3:20 PM.

## 2020-09-18 NOTE — PATIENT INSTRUCTIONS
Preventive Health Recommendations  Male Ages 50 - 64    Yearly exam:             See your health care provider every year in order to  o   Review health changes.   o   Discuss preventive care.    o   Review your medicines if your doctor has prescribed any.     Have a cholesterol test every 5 years, or more frequently if you are at risk for high cholesterol/heart disease.     Have a diabetes test (fasting glucose) every three years. If you are at risk for diabetes, you should have this test more often.     Have a colonoscopy at age 50, or have a yearly FIT test (stool test). These exams will check for colon cancer.      Talk with your health care provider about whether or not a prostate cancer screening test (PSA) is right for you.    You should be tested each year for STDs (sexually transmitted diseases), if you re at risk.     Shots: Get a flu shot each year. Get a tetanus shot every 10 years.     Nutrition:    Eat at least 5 servings of fruits and vegetables daily.     Eat whole-grain bread, whole-wheat pasta and brown rice instead of white grains and rice.     Get adequate Calcium and Vitamin D.     Lifestyle    Exercise for at least 150 minutes a week (30 minutes a day, 5 days a week). This will help you control your weight and prevent disease.     Limit alcohol to one drink per day.     No smoking.     Wear sunscreen to prevent skin cancer.     See your dentist every six months for an exam and cleaning.     See your eye doctor every 1 to 2 years.      Let's try Flonase nasal spray one puff each nostril twice daily  Restart Claritin one tab daily  Push more water/fluids on a daily basis   Stretch out your right calf and hamstrings daily

## 2020-09-19 LAB
ALBUMIN SERPL-MCNC: 4.1 G/DL (ref 3.4–5)
ALP SERPL-CCNC: 63 U/L (ref 40–150)
ALT SERPL W P-5'-P-CCNC: 23 U/L (ref 0–70)
ANION GAP SERPL CALCULATED.3IONS-SCNC: 10 MMOL/L (ref 3–14)
AST SERPL W P-5'-P-CCNC: 15 U/L (ref 0–45)
BILIRUB SERPL-MCNC: 0.4 MG/DL (ref 0.2–1.3)
BUN SERPL-MCNC: 15 MG/DL (ref 7–30)
CALCIUM SERPL-MCNC: 9 MG/DL (ref 8.5–10.1)
CHLORIDE SERPL-SCNC: 107 MMOL/L (ref 94–109)
CHOLEST SERPL-MCNC: 159 MG/DL
CO2 SERPL-SCNC: 23 MMOL/L (ref 20–32)
CREAT SERPL-MCNC: 0.88 MG/DL (ref 0.66–1.25)
GFR SERPL CREATININE-BSD FRML MDRD: >90 ML/MIN/{1.73_M2}
GLUCOSE SERPL-MCNC: 91 MG/DL (ref 70–99)
HDLC SERPL-MCNC: 36 MG/DL
LDLC SERPL CALC-MCNC: 61 MG/DL
NONHDLC SERPL-MCNC: 123 MG/DL
POTASSIUM SERPL-SCNC: 3.8 MMOL/L (ref 3.4–5.3)
PROT SERPL-MCNC: 7.3 G/DL (ref 6.8–8.8)
PSA SERPL-ACNC: 0.71 UG/L (ref 0–4)
SODIUM SERPL-SCNC: 140 MMOL/L (ref 133–144)
TRIGL SERPL-MCNC: 308 MG/DL

## 2020-09-21 LAB — HIV 1+2 AB+HIV1 P24 AG SERPL QL IA: NONREACTIVE

## 2020-09-25 ENCOUNTER — MYC MEDICAL ADVICE (OUTPATIENT)
Dept: FAMILY MEDICINE | Facility: CLINIC | Age: 50
End: 2020-09-25

## 2020-09-25 ENCOUNTER — THERAPY VISIT (OUTPATIENT)
Dept: PHYSICAL THERAPY | Facility: CLINIC | Age: 50
End: 2020-09-25
Payer: COMMERCIAL

## 2020-09-25 DIAGNOSIS — M75.02 ADHESIVE CAPSULITIS OF LEFT SHOULDER: ICD-10-CM

## 2020-09-25 DIAGNOSIS — R06.83 SNORING: Primary | ICD-10-CM

## 2020-09-25 PROCEDURE — 97110 THERAPEUTIC EXERCISES: CPT | Mod: GP | Performed by: PHYSICAL THERAPIST

## 2020-09-25 NOTE — TELEPHONE ENCOUNTER
Please ask Hemant if he has ever completed a sleep study  If not, we should have him see sleep medicine  Sometimes untreated sleep apnea can cause pulmonary arterial hypertension  ThanksKiet

## 2020-10-02 ENCOUNTER — MYC MEDICAL ADVICE (OUTPATIENT)
Dept: CARDIOLOGY | Facility: CLINIC | Age: 50
End: 2020-10-02

## 2020-10-07 ENCOUNTER — VIRTUAL VISIT (OUTPATIENT)
Dept: GASTROENTEROLOGY | Facility: CLINIC | Age: 50
End: 2020-10-07
Payer: COMMERCIAL

## 2020-10-07 DIAGNOSIS — K21.9 GASTROESOPHAGEAL REFLUX DISEASE WITHOUT ESOPHAGITIS: Primary | ICD-10-CM

## 2020-10-07 DIAGNOSIS — R10.11 ABDOMINAL PAIN, RIGHT UPPER QUADRANT: ICD-10-CM

## 2020-10-07 PROCEDURE — 99213 OFFICE O/P EST LOW 20 MIN: CPT | Mod: TEL | Performed by: INTERNAL MEDICINE

## 2020-10-07 RX ORDER — BACLOFEN 10 MG/1
5 TABLET ORAL 2 TIMES DAILY
Qty: 30 TABLET | Refills: 1 | Status: SHIPPED | OUTPATIENT
Start: 2020-10-07 | End: 2020-11-09

## 2020-10-07 NOTE — PATIENT INSTRUCTIONS
Start taking baclofen to see if it helps you - start with once a day at night time - if it doesn't cause too much fatigue, you can start taking it in the morning too.    Cut back the omeprazole to 20mg daily and see how you feel.  You can continue to use pepcid and gaviscon as needed.

## 2020-10-07 NOTE — PROGRESS NOTES
"     HPI:    Hemant presents today for a telephone visit to discuss esophageal discomfort. Some days seem worse than others.  Main feeling is just \"discomfort\" in the chest which comes and goes.  Occasionally if he drinks coffee or eats spicy foods he will get typical heartburn symptoms which improve with gaviscon and/or pepcid.  Did think he was doing better since resuming omeprazole initially but now symptoms have returned.    No dysphagia - lately has felt like his tongue/throat are \"weak\" - thinks it maybe related to seasonal allergies.  Unsure if improving since starting medications.     Is not using nortriptyline because he thinks it made it hard for him to sleep.     Past Medical History:   Diagnosis Date     Adhesive capsulitis of left shoulder 7/1/2020     Allergic rhinitis, cause unspecified     Allergic rhinitis     Essential hypertension, benign      History of blood transfusion      Other chronic allergic conjunctivitis     Allergic conjunctivitis     Tubular adenoma x3 09/20/2019    Surveillance colonoscopy recommended 9/2022       Past Surgical History:   Procedure Laterality Date     CLOSED REDUCTION, PERCUTANEOUS PINNING FINGER, COMBINED  5/25/2012    Procedure:COMBINED CLOSED REDUCTION, PERCUTANEOUS PINNING FINGER; Closed reductionand fixation by pinning of Right Thumb fracture       COLONOSCOPY N/A 8/25/2014    Procedure: COMBINED COLONOSCOPY, SINGLE BIOPSY/POLYPECTOMY BY BIOPSY;  Surgeon: Misael Jarvis MD;  Location:  GI     COLONOSCOPY N/A 9/20/2019    Procedure: COLONOSCOPY, WITH POLYPECTOMY AND BIOPSY;  Surgeon: Fred Edge MD;  Location:  OR     DENTAL SURGERY       ESOPHAGOSCOPY, GASTROSCOPY, DUODENOSCOPY (EGD), COMBINED  6/17/2013    Procedure: COMBINED ESOPHAGOSCOPY, GASTROSCOPY, DUODENOSCOPY (EGD), BIOPSY SINGLE OR MULTIPLE;;  Surgeon: Benjy Marinelli MD;  Location:  GI     ESOPHAGOSCOPY, GASTROSCOPY, DUODENOSCOPY (EGD), COMBINED N/A 8/25/2014    Procedure: " COMBINED ESOPHAGOSCOPY, GASTROSCOPY, DUODENOSCOPY (EGD), BIOPSY SINGLE OR MULTIPLE;  Surgeon: Misael Jarvis MD;  Location: [facility]     LASIK BILATERAL       thumb surgery       wisdom teeth removed  8-2014       Family History   Problem Relation Age of Onset     Breast Cancer Mother      Arthritis Father      Genitourinary Problems Father         stones     Cancer Father         skin     C.A.D. Father         stents/age late 60s     Coronary Artery Disease Father         Stents     Prostate Cancer Father         late 50s     Asthma Sister      Depression Sister      Cancer Paternal Grandfather         ? type     Eye Disorder Paternal Grandfather         cateracts       Social History     Tobacco Use     Smoking status: Never Smoker     Smokeless tobacco: Never Used   Substance Use Topics     Alcohol use: Yes     Comment: couple drinks a week        Assessment and Plan:    # atypical chest pain - EGDs in the past w/o evidence of EoE.  Had a negative bravo in the past, although, recent CTA did show small hiatal hernia.  Does get typical reflux symptoms occasionally but main issue is the constant discomfort.  Did have some improvement with nortriptyline in the past but didn't tolerate it - will give a trial of low dose baclofen.  Did discuss side effects with him.  Given no improvement on 40mg of omeprazole daily, will decrease to once daily.  Continue pepcid/gaviscon as needed.     RTC 2 months    Alexa Cordero DO     Phone call duration: 15 minutes

## 2020-10-07 NOTE — PROGRESS NOTES
"Gregory Aguilera is a 50 year old male who is being evaluated via a billable telephone visit.      The patient has been notified of following:     \"This telephone visit will be conducted via a call between you and your physician/provider. We have found that certain health care needs can be provided without the need for a physical exam.  This service lets us provide the care you need with a short phone conversation.  If a prescription is necessary we can send it directly to your pharmacy.  If lab work is needed we can place an order for that and you can then stop by our lab to have the test done at a later time.    Telephone visits are billed at different rates depending on your insurance coverage. During this emergency period, for some insurers they may be billed the same as an in-person visit.  Please reach out to your insurance provider with any questions.    If during the course of the call the physician/provider feels a telephone visit is not appropriate, you will not be charged for this service.\"    Patient has given verbal consent for Telephone visit?  Yes    What phone number would you like to be contacted at? [phone]       How would you like to obtain your AVS? [facility]    Phone call duration:  minutes    Rupert Tafoya MA        "

## 2020-10-09 ENCOUNTER — VIRTUAL VISIT (OUTPATIENT)
Dept: SLEEP MEDICINE | Facility: CLINIC | Age: 50
End: 2020-10-09
Attending: PHYSICIAN ASSISTANT
Payer: COMMERCIAL

## 2020-10-09 VITALS — WEIGHT: 189 LBS | HEIGHT: 67 IN | BODY MASS INDEX: 29.66 KG/M2

## 2020-10-09 DIAGNOSIS — E66.3 OVERWEIGHT (BMI 25.0-29.9): ICD-10-CM

## 2020-10-09 DIAGNOSIS — I28.8 ENLARGED PULMONARY ARTERY (H): ICD-10-CM

## 2020-10-09 DIAGNOSIS — I10 HYPERTENSION GOAL BP (BLOOD PRESSURE) < 140/90: ICD-10-CM

## 2020-10-09 DIAGNOSIS — R06.83 SNORING: Primary | ICD-10-CM

## 2020-10-09 PROCEDURE — 99203 OFFICE O/P NEW LOW 30 MIN: CPT | Mod: GT | Performed by: INTERNAL MEDICINE

## 2020-10-09 SDOH — ECONOMIC STABILITY: INCOME INSECURITY: HOW HARD IS IT FOR YOU TO PAY FOR THE VERY BASICS LIKE FOOD, HOUSING, MEDICAL CARE, AND HEATING?: NOT ASKED

## 2020-10-09 SDOH — ECONOMIC STABILITY: TRANSPORTATION INSECURITY
IN THE PAST 12 MONTHS, HAS LACK OF TRANSPORTATION KEPT YOU FROM MEETINGS, WORK, OR FROM GETTING THINGS NEEDED FOR DAILY LIVING?: NOT ASKED

## 2020-10-09 SDOH — ECONOMIC STABILITY: FOOD INSECURITY: WITHIN THE PAST 12 MONTHS, THE FOOD YOU BOUGHT JUST DIDN'T LAST AND YOU DIDN'T HAVE MONEY TO GET MORE.: NOT ASKED

## 2020-10-09 SDOH — ECONOMIC STABILITY: FOOD INSECURITY: WITHIN THE PAST 12 MONTHS, YOU WORRIED THAT YOUR FOOD WOULD RUN OUT BEFORE YOU GOT MONEY TO BUY MORE.: NOT ASKED

## 2020-10-09 SDOH — ECONOMIC STABILITY: TRANSPORTATION INSECURITY
IN THE PAST 12 MONTHS, HAS THE LACK OF TRANSPORTATION KEPT YOU FROM MEDICAL APPOINTMENTS OR FROM GETTING MEDICATIONS?: NOT ASKED

## 2020-10-09 ASSESSMENT — MIFFLIN-ST. JEOR: SCORE: 1673.55

## 2020-10-09 NOTE — PATIENT INSTRUCTIONS
"MY TREATMENT INFORMATION FOR SLEEP APNEA-  Gregory Aguilera    DOCTOR : Lauren Juarez MD  SLEEP CENTER :      MY CONTACT NUMBER:       Am I having a home sleep study?(Ordered-you should get a call to schedule in the next four weeks)  Watch this video:  /drop off device-   [email or web address]?v=[email or web address]  Disposable device sent out require phone/computer application-   [email or web address]/[email or web address]?v=BCce_vbiwxE  Please verify your insurance coverage with your insurance carrier    Frequently asked questions:  1. What is Obstructive Sleep Apnea (PATTI)? PATTI is the most common type of sleep apnea. Apnea means, \"without breath.\"  Apnea is most often caused by narrowing or collapse of the upper airway as muscles relax during sleep.   Almost everyone has occasional apneas. Most people with sleep apnea have had brief interruptions at night frequently for many years.  The severity of sleep apnea is related to how frequent and severe the events are.   2. What are the consequences of PATTI? Symptoms include: feeling sleepy during the day, snoring loudly, gasping or stopping of breathing, trouble sleeping, and occasionally morning headaches or heartburn at night.  Sleepiness can be serious and even increase the risk of falling asleep while driving. Other health consequences may include development of high blood pressure and other cardiovascular disease in persons who are susceptible. Untreated PATTI  can contribute to heart disease, stroke and diabetes.   3. What are the treatment options? In most situations, sleep apnea is a lifelong disease that must be managed with daily therapy. Medications are not effective for sleep apnea and surgery is generally not considered until other therapies have been tried. Your treatment is your choice . Continuous Positive Airway (CPAP) works right away and is the therapy that is effective in nearly everyone. An oral device to hold your jaw forward is usually the next " most reliable option. Other options include postioning devices (to keep you off your back), weight loss, and surgery including a tongue pacing device. There is more detail about some of these options below.    Important tips for using CPAP and similar devices   Know your equipment:  CPAP is continuous positive airway pressure that prevents obstructive sleep apnea by keeping the throat from collapsing while you are sleeping. In most cases, the device is  smart  and can slowly self-adjusts if your throat collapses and keeps a record every day of how well you are treated-this information is available to you and your care team.  BPAP is bilevel positive airway pressure that keeps your throat open and also assists each breath with a pressure boost to maintain adequate breathing.  Special kinds of BPAP are used in patients who have inadequate breathing from lung or heart disease. In most cases, the device is  smart  and can slowly self-adjusts to assist breathing. Like CPAP, the device keeps a record of how well you are treated.  Your mask is your connection to the device. You get to choose what feels most comfortable and the staff will help to make sure if fits. Here: are some examples of the different masks that are available:       Key points to remember on your journey with sleep apnea:  1. Sleep study.  PAP devices often need to be adjusted during a sleep study to show that they are effective and adjusted right.  2. Good tips to remember: Try wearing just the mask during a quiet time during the day so your body adapts to wearing it. A humidifier is recommended for comfort in most cases to prevent drying of your nose and throat. Allergy medication from your provider may help you if you are having nasal congestion.  3. Getting settled-in. It takes more than one night for most of us to get used to wearing a mask. Try wearing just the mask during a quiet time during the day so your body adapts to wearing it. A humidifier  is recommended for comfort in most cases. Our team will work with you carefully on the first day and will be in contact within 4 days and again at 2 and 4 weeks for advice and remote device adjustments. Your therapy is evaluated by the device each day.   4. Use it every night. The more you are able to sleep naturally for 7-8 hours, the more likely you will have good sleep and to prevent health risks or symptoms from sleep apnea. Even if you use it 4 hours it helps. Occasionally all of us are unable to use a medical therapy, in sleep apnea, it is not dangerous to miss one night.   5. Communicate. Call our skilled team on the number provided on the first day if your visit for problems that make it difficult to wear the device. Over 2 out of 3 patients can learn to wear the device long-term with help from our team. Remember to call our team or your sleep providers if you are unable to wear the device as we may have other solutions for those who cannot adapt to mask CPAP therapy. It is recommended that you sleep your sleep provider within the first 3 months and yearly after that if you are not having problems.   6. Use it for your health. We encourage use of CPAP masks during daytime quiet periods to allow your face and brain to adapt to the sensation of CPAP so that it will be a more natural sensation to awaken to at night or during naps. This can be very useful during the first few weeks or months of adapting to CPAP though it does not help medically to wear CPAP during wakefulness and  should not be used as a strategy just to meet guidelines.  7. Take care of your equipment. Make sure you clean your mask and tubing using directions every day and that your filter and mask are replaced as recommended or if they are not working.     BESIDES CPAP, WHAT OTHER THERAPIES ARE THERE?    Positioning Device  Positioning devices are generally used when sleep apnea is mild and only occurs on your back.This example shows a pillow  that straps around the waist. It may be appropriate for those whose sleep study shows milder sleep apnea that occurs primarily when lying flat on one's back. Preliminary studies have shown benefit but effectiveness at home may need to be verified by a home sleep test. These devices are generally not covered by medical insurance.  Examples of devices that maintain sleeping on the back to prevent snoring and mild sleep apnea.    Belt type body positioner  Http://Pro Hoop Strength.[employer]/    Electronic reminder  Http://nightshifttherapy.com/  Http://www.[employer].au/      Oral Appliance  What is oral appliance therapy?  An oral appliance device fits on your teeth at night like a retainer used after having braces. The device is made by a specialized dentist and requires several visits over 1-2 months before a manufactured device is made to fit your teeth and is adjusted to prevent your sleep apnea. Once an oral device is working properly, snoring should be improved. A home sleep test may be recommended at that time if to determine whether the sleep apnea is adequately treated.       Some things to remember:  -Oral devices are often, but not always, covered by your medical insurance. Be sure to check with your insurance provider.   -If you are referred for oral therapy, you will be given a list of specialized dentists to consider or you may choose to visit the Web site of the American Academy of Dental Sleep Medicine  -Oral devices are less likely to work if you have severe sleep apnea or are extremely overweight.     More detailed information  An oral appliance is a small acrylic device that fits over the upper and lower teeth  (similar to a retainer or a mouth guard). This device slightly moves jaw forward, which moves the base of the tongue forward, opens the airway, improves breathing for effective treat snoring and obstructive sleep apnea in perhaps 7 out of 10 people .  The best working devices are custom-made by a dental  device  after a mold is made of the teeth 1, 2, 3.  When is an oral appliance indicated?  Oral appliance therapy is recommended as a first-line treatment for patients with primary snoring, mild sleep apnea, and for patients with moderate sleep apnea who prefer appliance therapy to use of CPAP4, 5. Severity of sleep apnea is determined by sleep testing and is based on the number of respiratory events per hour of sleep.   How successful is oral appliance therapy?  The success rate of oral appliance therapy in patients with mild sleep apnea is 75-80% while in patients with moderate sleep apnea it is 50-70%. The chance of success in patients with severe sleep apnea is 40-50%. The research also shows that oral appliances have a beneficial effect on the cardiovascular health of PATTI patients at the same magnitude as CPAP therapy7.  Oral appliances should be a second-line treatment in cases of severe sleep apnea, but if not completely successful then a combination therapy utilizing CPAP plus oral appliance therapy may be effective. Oral appliances tend to be effective in a broad range of patients although studies show that the patients who have the highest success are females, younger patients, those with milder disease, and less severe obesity. 3, 6.   Finding a dentist that practices dental sleep medicine  Specific training is available through the American Academy of Dental Sleep Medicine for dentists interested in working in the field of sleep. To find a dentist who is educated in the field of sleep and the use of oral appliances, near you, visit the Web site of the American Academy of Dental Sleep Medicine.    References  1. Mian et al. Objectively measured vs self-reported compliance during oral appliance therapy for sleep-disordered breathing. Chest 2013; 144(5): 3599-1185.  2. Abby et al. Objective measurement of compliance during oral appliance therapy for sleep-disordered breathing.  Thorax 2013; 68(1): 91-96.  3. Moe et al. Mandibular advancement devices in 620 men and women with PATTI and snoring: tolerability and predictors of treatment success. Chest 2004; 125: 9083-3077.  4. Fer et al. Oral appliances for snoring and PATTI: a review. Sleep 2006; 29: 244-262.  5. Harini et al. Oral appliance treatment for PATTI: an update. J Clin Sleep Med 2014; 10(2): 215-227.  6. Shilpa et al. Predictors of OSAH treatment outcome. J Dent Res 2007; 86: 3948-1171.      Weight Loss:    Weight loss is a long-term strategy that may improve sleep apnea in some patients.      Surgery:    Surgery for obstructive sleep apnea is considered generally only when other therapies fail to work. Surgery may be discussed with you if you are having a difficult time tolerating CPAP and or when there is an abnormal structure that requires surgical correction.  Nose and throat surgeries often enlarge the airway to prevent collapse.  Most of these surgeries create pain for 1-2 weeks and up to half of the most common surgeries are not effective throughout life.  You should carefully discuss the benefits and drawbacks to surgery with your sleep provider and surgeon to determine if it is the best solution for you.   More information  Surgery for PATTI is directed at areas that are responsible for narrowing or complete obstruction of the airway during sleep.  There are a wide range of procedures available to enlarge and/or stabilize the airway to prevent blockage of breathing in the three major areas where it can occur: the palate, tongue, and nasal regions.  Successful surgical treatment depends on the accurate identification of the factors responsible for obstructive sleep apnea in each person.  A personalized approach is required because there is no single treatment that works well for everyone.  Because of anatomic variation, consultation with an examination by a sleep surgeon is a critical first step in determining  what surgical options are best for each patient.  In some cases, examination during sedation may be recommended in order to guide the selection of procedures.  Patients will be counseled about risks and benefits as well as the typical recovery course after surgery. Surgery is typically not a cure for a person s PATTI.  However, surgery will often significantly improve one s PATTI severity (termed  success rate ).  Even in the absence of a cure, surgery will decrease the cardiovascular risk associated with OSA7; improve overall quality of life8 (sleepiness, functionality, sleep quality, etc).      Palate Procedures:  Patients with PATTI often have narrowing of their airway in the region of their tonsils and uvula.  The goals of palate procedures are to widen the airway in this region as well as to help the tissues resist collapse.  Modern palate procedure techniques focus on tissue conservation and soft tissue rearrangement, rather than tissue removal.  Often the uvula is preserved in this procedure. Residual sleep apnea is common in patient after pharyngoplasty with an average reduction in sleep apnea events of 33%2.      Tongue Procedures:  ExamWhile patients are awake, the muscles that surround the throat are active and keep this region open for breathing. These muscles relax during sleep, allowing the tongue and other structures to collapse and block breathing.  There are several different tongue procedures available.  Selection of a tongue base procedure depends on characteristics seen on physical exam.  Generally, procedures are aimed at removing bulky tissues in this area or preventing the back of the tongue from falling back during sleep.  Success rates for tongue surgery range from 50-62%3.    Hypoglossal Nerve Stimulation:  Hypoglossal nerve stimulation has recently received approval from the [place] Food and Drug Administration for the treatment of obstructive sleep apnea.  This is based on research  showing that the system was safe and effective in treating sleep apnea6.  Results showed that the median AHI score decreased 68%, from 29.3 to 9.0. This therapy uses an implant system that senses breathing patterns and delivers mild stimulation to airway muscles, which keeps the airway open during sleep.  The system consists of three fully implanted components: a small generator (similar in size to a pacemaker), a breathing sensor, and a stimulation lead.  Using a small handheld remote, a patient turns the therapy on before bed and off upon awakening.    Candidates for this device must be greater than 22 years of age, have moderate to severe PATTI (AHI between 20-65), BMI less than 32, have tried CPAP/oral appliance without success, and have appropriate upper airway anatomy (determined by a sleep endoscopy performed by Dr. Nobles).    Hypoglossal Nerve Stimulation Pathway:    The sleep surgeon s office will work with the patient through the insurance prior-authorization process (including communications and appeals).    Nasal Procedures:  Nasal obstruction can interfere with nasal breathing during the day and night.  Studies have shown that relief of nasal obstruction can improve the ability of some patients to tolerate positive airway pressure therapy for obstructive sleep apnea1.  Treatment options include medications such as nasal saline, topical corticosteroid and antihistamine sprays, and oral medications such as antihistamines or decongestants. Non-surgical treatments can include external nasal dilators for selected patients. If these are not successful by themselves, surgery can improve the nasal airway either alone or in combination with these other options.      Combination Procedures:  Combination of surgical procedures and other treatments may be recommended, particularly if patients have more than one area of narrowing or persistent positional disease.  The success rate of combination surgery ranges from  66-80%2,3.    References  1. Ita COOLEY. The Role of the Nose in Snoring and Obstructive Sleep Apnoea: An Update.  Eur Arch Otorhinolaryngol. 2011; 268: 1365-73.  2.  Elder SM; Ria JA; Max JR; Pallanch JF; Ghulam MB; Kim SG; Rafi GALLOWAY. Surgical modifications of the upper airway for obstructive sleep apnea in adults: a systematic review and meta-analysis. SLEEP 2010;33(10):3535-4916. Andrew OLIVARES. Hypopharyngeal surgery in obstructive sleep apnea: an evidence-based medicine review.  Arch Otolaryngol Head Neck Surg. 2006 Feb;132(2):206-13.  3. Dustin YH1, Axel Y, Natanael LUNA. The efficacy of anatomically based multilevel surgery for obstructive sleep apnea. Otolaryngol Head Neck Surg. 2003 Oct;129(4):327-35.  4. Andrew OLIVARES, Goldberg A. Hypopharyngeal Surgery in Obstructive Sleep Apnea: An Evidence-Based Medicine Review. Arch Otolaryngol Head Neck Surg. 2006 Feb;132(2):206-13.  5. Tracy PJ et al. Upper-Airway Stimulation for Obstructive Sleep Apnea.  N Engl J Med. 2014 Jan 9;370(2):139-49.  6. Camille Y et al. Increased Incidence of Cardiovascular Disease in Middle-aged Men with Obstructive Sleep Apnea. Am J Respir Crit Care Med; 2002 166: 159-165  7. Echeverria EM et al. Studying Life Effects and Effectiveness of Palatopharyngoplasty (SLEEP) study: Subjective Outcomes of Isolated Uvulopalatopharyngoplasty. Otolaryngol Head Neck Surg. 2011; 144: 623-631.              Your BMI is Body mass index is 29.18 kg/m .  Weight management is a personal decision.  If you are interested in exploring weight loss strategies, the following discussion covers the approaches that may be successful. Body mass index (BMI) is one way to tell whether you are at a healthy weight, overweight, or obese. It measures your weight in relation to your height.  A BMI of 18.5 to 24.9 is in the healthy range. A person with a BMI of 25 to 29.9 is considered overweight, and someone with a BMI of 30 or greater is considered obese. More than two-thirds  of American adults are considered overweight or obese.  Being overweight or obese increases the risk for further weight gain. Excess weight may lead to heart disease and diabetes.  Creating and following plans for healthy eating and physical activity may help you improve your health.  Weight control is part of healthy lifestyle and includes exercise, emotional health, and healthy eating habits. Careful eating habits lifelong are the mainstay of weight control. Though there are significant health benefits from weight loss, long-term weight loss with diet alone may be very difficult to achieve- studies show long-term success with dietary management in less than 10% of people. Attaining a healthy weight may be especially difficult to achieve in those with severe obesity. In some cases, medications, devices and surgical management might be considered.  What can you do?  If you are overweight or obese and are interested in methods for weight loss, you should discuss this with your provider.     Consider reducing daily calorie intake by 500 calories.     Keep a food journal.     Avoiding skipping meals, consider cutting portions instead.    Diet combined with exercise helps maintain muscle while optimizing fat loss. Strength training is particularly important for building and maintaining muscle mass. Exercise helps reduce stress, increase energy, and improves fitness. Increasing exercise without diet control, however, may not burn enough calories to loose weight.       Start walking three days a week 10-20 minutes at a time    Work towards walking thirty minutes five days a week     Eventually, increase the speed of your walking for 1-2 minutes at time    In addition, we recommend that you review healthy lifestyles and methods for weight loss available through the National Institutes of Health patient information sites:  http://win.niddk.nih.gov/publications/index.htm    And look into health and wellness programs that may  be available through your health insurance provider, employer, local community center, or cassy club.    Weight management plan: Patient was referred to their PCP to discuss a diet and exercise plan.

## 2020-10-09 NOTE — LETTER
"    10/9/2020         RE: Gregory Aguilera  [address]  [facility] [address]        Dear Colleague,    Thank you for referring your patient, Gregory Aguilera, to the [facility]. Please see a copy of my visit note below.    Gregory Aguilera is a 50 year old male who is being evaluated via a billable video visit.      The patient has been notified of following:     \"This video visit will be conducted via a call between you and your physician/provider. We have found that certain health care needs can be provided without the need for an in-person physical exam.  This service lets us provide the care you need with a video conversation.  If a prescription is necessary we can send it directly to your pharmacy.  If lab work is needed we can place an order for that and you can then stop by our lab to have the test done at a later time.    Video visits are billed at different rates depending on your insurance coverage.  Please reach out to your insurance provider with any questions.    If during the course of the call the physician/provider feels a video visit is not appropriate, you will not be charged for this service.\"    Patient has given verbal consent for Video visit? Yes  How would you like to obtain your AVS? MyChart  If you are dropped from the video visit, the video invite should be resent to: Text to cell phone: [phone]  Will anyone else be joining your video visit? No        Video-Visit Details    Type of service:  Video Visit    Video Start Time: 9:41 AM  Video End Time: 10:12 AM    Originating Location (pt. Location): Home    Distant Location (provider location):  [facility]     Platform used for Video Visit: [id number]      Chief complaint: Consultation requested by Kiet Watkins PA-C for the evaluation of possible obstructive sleep apnea    History of Present Illness: 50-year-old gentleman with history of hypertension, heartburn, " allergies and loud snoring.  He has been told multiple times about his loud snoring.  It can be heard through the mai.  His wife has gotten used to it and does sleep in the same bed.  No observed apneas.  He has been told to be evaluated for sleep apnea however on a couple of occasions and is following through recently because of possible enlarged pulmonary artery identified on CT scan.  He has been having problems with chest tightness and being evaluated and treated for esophageal issues and part of the evaluation for cardiac disease showed the enlarged pulmonary artery, borderline.    Typical bedtime is around midnight to 1 getting up around 8 currently.  He is not taking naps.  He does wake up sometimes with joint and muscle aches.  He gets up about once a night to go the bathroom but not every night.  He does wake up feeling somewhat tired.  Drinks about 1 to 2 cups of coffee in the morning.  He tries to avoid excessive caffeine due to his esophageal issues.  He is also rarely drinking alcohol.  He is not using any sleep aids although he just started baclofen for esophageal issue which is making him sleepier at night.  He will occasionally have headaches but there is no clear pattern associated with sleep.  He does not take naps.  He occasionally will wake up with a snort or dry cough which she associates with his spring and fall allergies.  No nocturnal reflux symptoms with his current medication regimen.  His weight has been stable and less than what it was 10 years ago or so.  He was diagnosed with hypertension in the 1990s and has been on stable dosing of blood pressure medication for the last 5 years or so.  He is also been told that he grinds his teeth by his dentist.  He has difficulty sleeping on his left shoulder because of pain so he tries to sleep on his right side.  She tries to avoid being on his back because of the snoring.  He thinks his shirt collar size would be 17 inches.    Total score -  Alsen: 4 (10/9/2020  8:00 AM) (Less than 10 normal)    MASSIEL Total Score: 8 (normal 0-7, mild 8-14, moderate 15-21, severe 22-28)    STOP-BANG  Loud Snore   1  Excessively Tired/Sleepy   0  Observed apnea   0  Hypertension   1  BMI> 35 kg/m2   0  Age >50   1  Neck >16 in/40cm   1  Male Gender   1  Total =   5  (0-2 low, 3-4 intermediate, 5-8 high risk of PATTI)      Past Medical History:   Diagnosis Date     Adhesive capsulitis of left shoulder 7/1/2020     Allergic rhinitis, cause unspecified     Allergic rhinitis     Essential hypertension, benign      History of blood transfusion      Other chronic allergic conjunctivitis     Allergic conjunctivitis     Tubular adenoma x3 09/20/2019    Surveillance colonoscopy recommended 9/2022       Allergies   Allergen Reactions     Dust Mites      Grass      Mold      No Known Drug Allergies      Trees        Current Outpatient Medications   Medication     acetaminophen (TYLENOL) 325 MG tablet     Alum Hydroxide-Mag Carbonate (GAVISCON PO)     aspirin (ASA) 81 MG EC tablet     atorvastatin (LIPITOR) 20 MG tablet     baclofen (LIORESAL) 10 MG tablet     Esomeprazole Magnesium (NEXIUM PO)     fluticasone (FLOVENT DISKUS) 50 MCG/BLIST AEPB     ibuprofen (ADVIL/MOTRIN) 200 MG tablet     lisinopril (ZESTRIL) 20 MG tablet     loratadine (CLARITIN REDITABS) 10 MG dispersible tablet     omeprazole (PRILOSEC) 20 MG DR capsule     Probiotic Product (PROBIOTIC DAILY PO)     Wheat Dextrin (BENEFIBER DRINK MIX PO)     No current facility-administered medications for this visit.        Social History     Socioeconomic History     Marital status:      Spouse name: Not on file     Number of children: Not on file     Years of education: Not on file     Highest education level: Not on file   Occupational History     Not on file   Social Needs     Financial resource strain: Not on file     Food insecurity     Worry: Not on file     Inability: Not on file     Transportation needs      Medical: Not on file     Non-medical: Not on file   Tobacco Use     Smoking status: Never Smoker     Smokeless tobacco: Never Used   Substance and Sexual Activity     Alcohol use: Yes     Comment: couple drinks a week     Drug use: No     Sexual activity: Yes     Partners: Female     Birth control/protection: Condom   Lifestyle     Physical activity     Days per week: Not on file     Minutes per session: Not on file     Stress: Not on file   Relationships     Social connections     Talks on phone: Not on file     Gets together: Not on file     Attends Jainism service: Not on file     Active member of club or organization: Not on file     Attends meetings of clubs or organizations: Not on file     Relationship status: Not on file     Intimate partner violence     Fear of current or ex partner: Not on file     Emotionally abused: Not on file     Physically abused: Not on file     Forced sexual activity: Not on file   Other Topics Concern     Parent/sibling w/ CABG, MI or angioplasty before 65F 55M? No   Social History Narrative    Dairy/d 2-3 servings/d     Caffeine 1-2 servings/d    Exercise 2 x week-gym regularly treadmill/hockey    Sunscreen used - No    Seatbelts used - Yes    Working smoke/CO detectors in the home - Yes    Guns stored in the home - No    Self Breast Exams - NA    Self Testicular Exam - yes    Eye Exam up to date - Yes   2008    Dental Exam up to date - Yes  2008    Pap Smear up to date - NA    Mammogram up to date - NA    PSA up to date - No    Dexa Scan up to date - NA    Flex Sig / Colonoscopy up to date - No    Immunizations up to date - 2006 td    Abuse: Current or Past(Physical, Sexual or Emotional)- No    Do you feel safe in your environment - Yes    Updated 5/2009       Family History   Problem Relation Age of Onset     Breast Cancer Mother      Arthritis Father      Genitourinary Problems Father         stones     Cancer Father         skin     C.A.D. Father         stents/age late 60s  "    Coronary Artery Disease Father         Stents     Prostate Cancer Father         late 50s     Asthma Sister      Depression Sister      Cancer Paternal Grandfather         ? type     Eye Disorder Paternal Grandfather         cateracts       Review of Systems: Positve for per HPI, otherwise comprehensive review of systems is negative.    EXAM:  Ht 1.714 m (5' 7.48\")   Wt 85.7 kg (189 lb)   BMI 29.18 kg/m    GENERAL: Healthy, alert and no distress  EYES: Eyes grossly normal to inspection.  No discharge or erythema, or obvious scleral/conjunctival abnormalities.  RESP: No audible wheeze, cough, or visible cyanosis.  No visible retractions or increased work of breathing.    SKIN: Visible skin clear. No significant rash, abnormal pigmentation or lesions.  NEURO: Cranial nerves grossly intact.  Mentation and speech appropriate for age.  PSYCH: Mentation appears normal, affect normal/bright, judgement and insight intact, normal speech and appearance well-groomed.    Coronary CTA rad read lung 9/17/2020                                        Impression:   1. No focal consolidation. No suspicious pulmonary nodules.  2. Borderline enlarged main pulmonary artery which is nonspecific but  can be seen with pulmonary arterial hypertension.  3. Small hiatal hernia.  4. Please see same day cardiac CT for further findings.      ASSESSMENT:  50-year-old gentleman with a history of hypertension, reflux, overweight, seasonal allergies. STOP BANG score of 5 suggests high risk for obstructive sleep apnea.  Borderline enlarged pulmonary artery reported on CT scan nonspecific finding but can be associated with hypoxemia.    PLAN:  We reviewed the pathophysiology of obstructive sleep apnea.  We reviewed the importance of treating sleep apnea given history of hypertension and risks associated with untreated significant sleep apnea.  We discussed testing options including in lab testing and home sleep apnea testing.  He is a good " candidate for home sleep apnea testing.  We also reviewed treatment options such as oral appliances and CPAP.  If mild to moderate sleep apnea and oral appliance may be a good option however if moderate to severe consider CPAP.    Lauren Juarez M.D.  Pulmonary/Critical Care/Sleep Medicine    [facility]   [address], [address]   [address] [place] [address]   Appointments: [phone]    The above note was dictated using voice recognition software and may include typographical errors. Please contact the author for any clarifications.                      Again, thank you for allowing me to participate in the care of your patient.        Sincerely,        Lauren Juarez MD

## 2020-10-09 NOTE — PROGRESS NOTES
"Gregory Aguilera is a 50 year old male who is being evaluated via a billable video visit.      The patient has been notified of following:     \"This video visit will be conducted via a call between you and your physician/provider. We have found that certain health care needs can be provided without the need for an in-person physical exam.  This service lets us provide the care you need with a video conversation.  If a prescription is necessary we can send it directly to your pharmacy.  If lab work is needed we can place an order for that and you can then stop by our lab to have the test done at a later time.    Video visits are billed at different rates depending on your insurance coverage.  Please reach out to your insurance provider with any questions.    If during the course of the call the physician/provider feels a video visit is not appropriate, you will not be charged for this service.\"    Patient has given verbal consent for Video visit? Yes  How would you like to obtain your AVS? MyChart  If you are dropped from the video visit, the video invite should be resent to: Text to cell phone: [phone]  Will anyone else be joining your video visit? No        Video-Visit Details    Type of service:  Video Visit    Video Start Time: 9:41 AM  Video End Time: 10:12 AM    Originating Location (pt. Location): Home    Distant Location (provider location):  [facility]     Platform used for Video Visit: Ghislaine      Chief complaint: Consultation requested by Kiet Watkins PA-C for the evaluation of possible obstructive sleep apnea    History of Present Illness: 50-year-old gentleman with history of hypertension, heartburn, allergies and loud snoring.  He has been told multiple times about his loud snoring.  It can be heard through the mai.  His wife has gotten used to it and does sleep in the same bed.  No observed apneas.  He has been told to be evaluated for sleep apnea however on a " couple of occasions and is following through recently because of possible enlarged pulmonary artery identified on CT scan.  He has been having problems with chest tightness and being evaluated and treated for esophageal issues and part of the evaluation for cardiac disease showed the enlarged pulmonary artery, borderline.    Typical bedtime is around midnight to 1 getting up around 8 currently.  He is not taking naps.  He does wake up sometimes with joint and muscle aches.  He gets up about once a night to go the bathroom but not every night.  He does wake up feeling somewhat tired.  Drinks about 1 to 2 cups of coffee in the morning.  He tries to avoid excessive caffeine due to his esophageal issues.  He is also rarely drinking alcohol.  He is not using any sleep aids although he just started baclofen for esophageal issue which is making him sleepier at night.  He will occasionally have headaches but there is no clear pattern associated with sleep.  He does not take naps.  He occasionally will wake up with a snort or dry cough which she associates with his spring and fall allergies.  No nocturnal reflux symptoms with his current medication regimen.  His weight has been stable and less than what it was 10 years ago or so.  He was diagnosed with hypertension in the 1990s and has been on stable dosing of blood pressure medication for the last 5 years or so.  He is also been told that he grinds his teeth by his dentist.  He has difficulty sleeping on his left shoulder because of pain so he tries to sleep on his right side.  She tries to avoid being on his back because of the snoring.  He thinks his shirt collar size would be 17 inches.    Total score - Coleman: 4 (10/9/2020  8:00 AM) (Less than 10 normal)    MASSIEL Total Score: 8 (normal 0-7, mild 8-14, moderate 15-21, severe 22-28)    STOP-BANG  Loud Snore   1  Excessively Tired/Sleepy   0  Observed apnea   0  Hypertension   1  BMI> 35 kg/m2   0  Age >50   1  Neck >16  in/40cm   1  Male Gender   1  Total =   5  (0-2 low, 3-4 intermediate, 5-8 high risk of PATTI)      Past Medical History:   Diagnosis Date     Adhesive capsulitis of left shoulder 7/1/2020     Allergic rhinitis, cause unspecified     Allergic rhinitis     Essential hypertension, benign      History of blood transfusion      Other chronic allergic conjunctivitis     Allergic conjunctivitis     Tubular adenoma x3 09/20/2019    Surveillance colonoscopy recommended 9/2022       Allergies   Allergen Reactions     Dust Mites      Grass      Mold      No Known Drug Allergies      Trees        Current Outpatient Medications   Medication     acetaminophen (TYLENOL) 325 MG tablet     Alum Hydroxide-Mag Carbonate (GAVISCON PO)     aspirin (ASA) 81 MG EC tablet     atorvastatin (LIPITOR) 20 MG tablet     baclofen (LIORESAL) 10 MG tablet     Esomeprazole Magnesium (NEXIUM PO)     fluticasone (FLOVENT DISKUS) 50 MCG/BLIST AEPB     ibuprofen (ADVIL/MOTRIN) 200 MG tablet     lisinopril (ZESTRIL) 20 MG tablet     loratadine (CLARITIN REDITABS) 10 MG dispersible tablet     omeprazole (PRILOSEC) 20 MG DR capsule     Probiotic Product (PROBIOTIC DAILY PO)     Wheat Dextrin (BENEFIBER DRINK MIX PO)     No current facility-administered medications for this visit.        Social History     Socioeconomic History     Marital status:      Spouse name: Not on file     Number of children: Not on file     Years of education: Not on file     Highest education level: Not on file   Occupational History     Not on file   Social Needs     Financial resource strain: Not on file     Food insecurity     Worry: Not on file     Inability: Not on file     Transportation needs     Medical: Not on file     Non-medical: Not on file   Tobacco Use     Smoking status: Never Smoker     Smokeless tobacco: Never Used   Substance and Sexual Activity     Alcohol use: Yes     Comment: couple drinks a week     Drug use: No     Sexual activity: Yes     Partners:  Female     Birth control/protection: Condom   Lifestyle     Physical activity     Days per week: Not on file     Minutes per session: Not on file     Stress: Not on file   Relationships     Social connections     Talks on phone: Not on file     Gets together: Not on file     Attends Mu-ism service: Not on file     Active member of club or organization: Not on file     Attends meetings of clubs or organizations: Not on file     Relationship status: Not on file     Intimate partner violence     Fear of current or ex partner: Not on file     Emotionally abused: Not on file     Physically abused: Not on file     Forced sexual activity: Not on file   Other Topics Concern     Parent/sibling w/ CABG, MI or angioplasty before 65F 55M? No   Social History Narrative    Dairy/d 2-3 servings/d     Caffeine 1-2 servings/d    Exercise 2 x week-gym regularly treadmill/hockey    Sunscreen used - No    Seatbelts used - Yes    Working smoke/CO detectors in the home - Yes    Guns stored in the home - No    Self Breast Exams - NA    Self Testicular Exam - yes    Eye Exam up to date - Yes   2008    Dental Exam up to date - Yes  2008    Pap Smear up to date - NA    Mammogram up to date - NA    PSA up to date - No    Dexa Scan up to date - NA    Flex Sig / Colonoscopy up to date - No    Immunizations up to date - 2006 td    Abuse: Current or Past(Physical, Sexual or Emotional)- No    Do you feel safe in your environment - Yes    Updated 5/2009       Family History   Problem Relation Age of Onset     Breast Cancer Mother      Arthritis Father      Genitourinary Problems Father         stones     Cancer Father         skin     C.A.D. Father         stents/age late 60s     Coronary Artery Disease Father         Stents     Prostate Cancer Father         late 50s     Asthma Sister      Depression Sister      Cancer Paternal Grandfather         ? type     Eye Disorder Paternal Grandfather         cateracts       Review of Systems: Positve  "for per HPI, otherwise comprehensive review of systems is negative.    EXAM:  Ht 1.714 m (5' 7.48\")   Wt 85.7 kg (189 lb)   BMI 29.18 kg/m    GENERAL: Healthy, alert and no distress  EYES: Eyes grossly normal to inspection.  No discharge or erythema, or obvious scleral/conjunctival abnormalities.  RESP: No audible wheeze, cough, or visible cyanosis.  No visible retractions or increased work of breathing.    SKIN: Visible skin clear. No significant rash, abnormal pigmentation or lesions.  NEURO: Cranial nerves grossly intact.  Mentation and speech appropriate for age.  PSYCH: Mentation appears normal, affect normal/bright, judgement and insight intact, normal speech and appearance well-groomed.    Coronary CTA rad read lung 9/17/2020                                        Impression:   1. No focal consolidation. No suspicious pulmonary nodules.  2. Borderline enlarged main pulmonary artery which is nonspecific but  can be seen with pulmonary arterial hypertension.  3. Small hiatal hernia.  4. Please see same day cardiac CT for further findings.      ASSESSMENT:  50-year-old gentleman with a history of hypertension, reflux, overweight, seasonal allergies. STOP BANG score of 5 suggests high risk for obstructive sleep apnea.  Borderline enlarged pulmonary artery reported on CT scan nonspecific finding but can be associated with hypoxemia.    PLAN:  We reviewed the pathophysiology of obstructive sleep apnea.  We reviewed the importance of treating sleep apnea given history of hypertension and risks associated with untreated significant sleep apnea.  We discussed testing options including in lab testing and home sleep apnea testing.  He is a good candidate for home sleep apnea testing.  We also reviewed treatment options such as oral appliances and CPAP.  If mild to moderate sleep apnea and oral appliance may be a good option however if moderate to severe consider CPAP.    Lauren Juarez M.D.  Pulmonary/Critical " Care/Sleep Medicine    [facility]   [address], [address]   [address] [place] [address]   Appointments: [phone]    The above note was dictated using voice recognition software and may include typographical errors. Please contact the author for any clarifications.

## 2020-10-27 ENCOUNTER — MYC MEDICAL ADVICE (OUTPATIENT)
Dept: CARDIOLOGY | Facility: CLINIC | Age: 50
End: 2020-10-27

## 2020-11-07 DIAGNOSIS — K21.9 GASTROESOPHAGEAL REFLUX DISEASE WITHOUT ESOPHAGITIS: ICD-10-CM

## 2020-11-09 RX ORDER — BACLOFEN 10 MG/1
5 TABLET ORAL 2 TIMES DAILY
Qty: 30 TABLET | Refills: 3 | Status: SHIPPED | OUTPATIENT
Start: 2020-11-09 | End: 2021-02-03

## 2020-11-09 NOTE — TELEPHONE ENCOUNTER
baclofen (LIORESAL) 10 MG tablet  Last Written Prescription Date:  10/7/2020  Last Fill Quantity: 30,   # refills: 1  Last Office Visit : 10/7/2020  Future Office visit:  None  Routing refill request to provider for review/approval because:  Drug not on the [facility], P or [facility] refill protocol or controlled substance      Kathy Lemus RN  Central Triage Red Flags/Med Refills

## 2020-11-16 ENCOUNTER — OFFICE VISIT (OUTPATIENT)
Dept: SLEEP MEDICINE | Facility: CLINIC | Age: 50
End: 2020-11-16
Payer: COMMERCIAL

## 2020-11-16 DIAGNOSIS — I28.8 ENLARGED PULMONARY ARTERY (H): ICD-10-CM

## 2020-11-16 DIAGNOSIS — E66.3 OVERWEIGHT (BMI 25.0-29.9): ICD-10-CM

## 2020-11-16 DIAGNOSIS — I10 HYPERTENSION GOAL BP (BLOOD PRESSURE) < 140/90: ICD-10-CM

## 2020-11-16 DIAGNOSIS — R06.83 SNORING: ICD-10-CM

## 2020-11-16 PROCEDURE — G0399 HOME SLEEP TEST/TYPE 3 PORTA: HCPCS | Performed by: INTERNAL MEDICINE

## 2020-11-17 ENCOUNTER — DOCUMENTATION ONLY (OUTPATIENT)
Dept: SLEEP MEDICINE | Facility: CLINIC | Age: 50
End: 2020-11-17
Payer: COMMERCIAL

## 2020-11-19 NOTE — PROGRESS NOTES
This HSAT was performed using a Noxturnal T3 device which recorded snore, sound, movement activity, body position, nasal pressure, oronasal thermal airflow, pulse, oximetry and both chest and abdominal respiratory effort. HSAT data was restricted to the time patient states they were in bed.     HSAT was scored using 1B 4% hypopnea rule.     HST AHI (Non-PAT): 25.9  Snoring was reported as mild.  Time with SpO2 below 89% was 37.6 minutes.   Overall signal quality was good     Pt will follow up with sleep provider to determine appropriate therapy.

## 2020-11-19 NOTE — PROGRESS NOTES
HST POST-STUDY QUESTIONNAIRE    1. What time did you go to bed?  0020  2. How long do you think it took to fall asleep?  15 minutes  3. What time did you wake up to start the day?  0700  4. Did you get up during the night at all?  Yes  5. If you woke up, do you remember approximately what time(s)? 0440  6. Did you have any difficulty with the equipment?  No  7. Did you us any type of treatment with this study?  None  8. Was the head of the bed elevated? No  9. Did you sleep in a recliner?  No  10. Did you stop using CPAP at least 3 days before this test?  NA  11. Any other information you'd like us to know? No

## 2020-11-22 ENCOUNTER — HEALTH MAINTENANCE LETTER (OUTPATIENT)
Age: 50
End: 2020-11-22

## 2020-11-23 NOTE — PROGRESS NOTES
HST POST-STUDY QUESTIONNAIRE     1. What time did you go to bed?             0020  2. How long do you think it took to fall asleep?  15 minutes  3. What time did you wake up to start the day?             0700  4. Did you get up during the night at all?            Yes  5. If you woke up, do you remember approximately what time(s)?       0440  6. Did you have any difficulty with the equipment?  No  7. Did you us any type of treatment with this study?  None  8. Was the head of the bed elevated? No  9. Did you sleep in a recliner?  No  10. Did you stop using CPAP at least 3 days before this test?  NA  11. Any other information you'd like us to know? No          Scoring Results    This HSAT was performed using a Noxturnal T3 device which recorded snore, sound, movement activity, body position, nasal pressure, oronasal thermal airflow, pulse, oximetry and both chest and abdominal respiratory effort. HSAT data was restricted to the time patient states they were in bed.      HSAT was scored using 1B 4% hypopnea rule.      HST AHI (Non-PAT): 25.9  Snoring was reported as mild.  Time with SpO2 below 89% was 37.6 minutes.   Overall signal quality was good      Pt will follow up with sleep provider to determine appropriate therapy.

## 2020-11-24 NOTE — PROCEDURES
"HOME SLEEP STUDY INTERPRETATION    Patient: Gregory Aguilera  MRN: [id number]  YOB: 1970  Study Date: 11/16/2020  Referring Provider: Kiet Watkins MD  Ordering Provider: Lauren Juarez MD     Indications for Home Study: Gregory Aguilera is a 50 year old male with a history of hypertension who presents with symptoms suggestive of obstructive sleep apnea.    Estimated body mass index is 29.18 kg/m  as calculated from the following:    Height as of 10/9/20: 1.714 m (5' 7.48\").    Weight as of 10/9/20: 85.7 kg (189 lb).  Total score - Oscar: 4 (10/9/2020  8:00 AM)  StopBang Total Score: 5 (10/9/2020  8:00 AM)    Data: A full night home sleep study was performed recording the standard physiologic parameters including body position, movement, sound, nasal pressure, thermal oral airflow, chest and abdominal movements with respiratory inductance plethysmography, and oxygen saturation by pulse oximetry. Pulse rate was estimated by oximetry recording. This study was considered adequate based on > 4 hours of quality oximetry and respiratory recording. As specified by the AASM Manual for the Scoring of Sleep and Associated events, version 2.3, Rule VIII.D 1B, 4% oxygen desaturation scoring for hypopneas is used as a standard of care on all home sleep apnea testing.    Analysis Time:  442 minutes    Respiration:   Sleep Associated Hypoxemia: sustained hypoxemia was present. Baseline oxygen saturation was 93%.  Time with saturation less than or equal to 88% was 37.6 minutes. The lowest oxygen saturation was 75%.   Snoring: Snoring was present.  Respiratory events: The home study revealed a presence of 56 obstructive apneas and 4 mixed and central apneas. There were 131 hypopneas resulting in a combined apnea/hypopnea index [AHI] of 25.9 events per hour.  AHI was 33.8 per hour supine, NA per hour prone, 13.8 per hour on left side, and 5 per hour on right side.   Pattern: Excluding events noted above, " respiratory rate and pattern was Normal.    Position: Percent of time spent: supine - 64%, prone - 0%, on left - 31%, on right - 5%.    Heart Rate: By pulse oximetry normal rate was noted.     Assessment:   Moderate obstructive sleep apnea.  Sleep associated hypoxemia was present.    Recommendations:  Consider auto-CPAP at 5-15 cmH2O or oral appliance therapy.  Suggest optimizing sleep hygiene and avoiding sleep deprivation.  Weight management.    Diagnosis Code(s): Obstructive Sleep Apnea G47.33, Hypoxemia G47.36    Lauren Juarez MD, November 24, 2020   Diplomate, American Board of Internal Medicine, Sleep Medicine

## 2020-12-02 VITALS — WEIGHT: 185 LBS | BODY MASS INDEX: 28.04 KG/M2 | HEIGHT: 68 IN

## 2020-12-02 ASSESSMENT — MIFFLIN-ST. JEOR: SCORE: 1663.65

## 2020-12-02 NOTE — PATIENT INSTRUCTIONS
For general sleep health questions: http://sleepeducation.org    For tips about PAP and COVID -19:  https://www.thoracic.org/patients/patient-resources/resources/covid-19-and-home-pap-therapy.pdf        Continue PAP therapy every night, for all hours that you are sleeping.  If you nap use CPAP.  As always, try to get at least 8 hours of sleep or more each day, keep a regular sleep schedule, and avoid sleep deprivation. Avoid alcohol.    Reasons that you might need a change to your pressure therapy would be weight gain or loss, waking having inadvertently removed your PAP overnight, having previously felt refreshed by sleep with CPAP use and now waking un-refreshed, and return of daytime sleepiness. Also, the development of new medical problems  (such as heart failure, stroke, medications such as narcotics) can sometimes affect breathing at night and change your PAP therapy needs.    Please bring PAP with you if you are hospitalized.  If anticipating surgery be sure to discuss with your surgeon that you have sleep apnea and use PAP therapy.      Maintain your equipment as recommended which includes routine cleaning and replacement of supplies.      Call DME for any questions regarding supplies or maintenance.    [facility], [facility] [phone]      Do not drive on engage in potentially dangerous activities if feeling sleepy.    Please follow up in sleep clinic again in 2 months         Tips for your PAP use-    Mask fitting tips  Mask fitting exercise:    To improve your mask seal and your mobility at night, put mask on and secure in place.  Lie down in bed with full pressure and roll to one side, adjust headgear while in that position to eliminate any leaks. Repeat process rolling to other side.     The mask seal does not have to be perfect:   CPAP machines are designed to make up for small leaks. However, you will not tolerate leaks blowing in your eyes so you will  need to adjust.   Any leak should only be near or at the bottom of the mask.  We expect your mask to leak slightly at night.    Do not over-tighten the headgear straps, tighter IS NOT better, we expect minimal leak.    First try re-positioning the mask or headgear before tightening the headgear straps.  Mask leaks are expected due to changing sleeping positions. Try pulling the mask away from your skin allowing the cushion to re-inflate will minimize the leak.  If you struggle for a good fit, try turning the CPAP off and then readjust the mask by pulling it away from your face and then turning back on the CPAP.        Humidifier tips  Humidifiers can be adjusted to increase or decrease the amount of moisture according to your comfort level. You may need to adjust this frequently at first, but then might only change it with seasonal weather changes.     Try INCREASING the humidity if:  You experience a dry, irritated nasal passage or throat.  You have a runny, drippy nose or sneezing fits after using CPAP.  You experience nasal congestion during or after CPAP use.    Try DECREASING the humidity if:  You have excessive condensation or  rain out  in the tubing or mask.  Otherwise keep the tubing warm during the night by running it underneath the blankets or pillow.      Clinic visit after initial PAP set-up    We will be extracting your data from the machine.        Travel  Always take your equipment with you.  If you fly with your equipment bring it on with you as a carry on.  Medical equipment does not count as a carry on.    If you travel international the machines take 110-240.  The only adapter needed is the adapter that will fit into the receptacle (outlet).    You may also want to bring an extension cord as many hotel rooms have limited outlets at the bedside.  Do not travel with water in your humidifier chamber.     Cleaning and Maintenance Guidelines    Equipment Frequency Cleaning Method   Mask First  Day    Daily      Weekly Soak mask in hot soapy water for 30 minutes, rinse and air dry.  Wipe nasal cushion with a hot soapy (Ivory, baby shampoo) cloth and rinse.  Baby wipes may also be used.  Do not use anti-bacterial soaps,Emelia  liquid soap, rubbing alcohol, bleach or ammonia.  Wash frame in hot soapy water (Ivory, baby shampoo) rinse and let air dry   Headgear Biweekly Wash in hot soapy water, rinse and air dry   Reusable Gray Filter Weekly Wash in hot soapy water, rinse, put in towel squeeze moisture out, let air dry   Disposable White Filter Check Weekly Replace when brown or gray in color; at least every 2 to 3 months   Humidifier Chamber Daily    Weekly Empty distilled water from humidifier and let air dry    Hand wash in hot soapy water, rinse and air dry   Tubing Weekly Wash in hot soapy water, rinse and let air dry   Mask, Tubing and Humidifier Chamber As needed Disinfect: Soak in 1 part distilled white vinegar to 3 parts hot water for 30 minutes, rinse well and air dry  Not the material headgear        MASK AND SUPPLY REORDERING and EQUIPMENT NEEDS through your DME and per your insurance  Reminder: Most insurance companies will allow for a new mask, headgear, tubing, and reusable gray filter every six months.  Disposable white ultra-fine filters are covered monthly.      HOME AND SAFETY INSTRUCTIONS    Do not use frayed or cracked electrical cords, multi plug adaptors, or switched receptacles    Do not immerse electrical equipment into water    Assure that electrical cords do not become a tripping hazard      Your BMI is Body mass index is 28.13 kg/m .  Weight management is a personal decision.  If you are interested in exploring weight loss strategies, the following discussion covers the approaches that may be successful. Body mass index (BMI) is one way to tell whether you are at a healthy weight, overweight, or obese. It measures your weight in relation to your height.  A BMI of 18.5 to 24.9 is in  the healthy range. A person with a BMI of 25 to 29.9 is considered overweight, and someone with a BMI of 30 or greater is considered obese. More than two-thirds of American adults are considered overweight or obese.  Being overweight or obese increases the risk for further weight gain. Excess weight may lead to heart disease and diabetes.  Creating and following plans for healthy eating and physical activity may help you improve your health.  Weight control is part of healthy lifestyle and includes exercise, emotional health, and healthy eating habits. Careful eating habits lifelong are the mainstay of weight control. Though there are significant health benefits from weight loss, long-term weight loss with diet alone may be very difficult to achieve- studies show long-term success with dietary management in less than 10% of people. Attaining a healthy weight may be especially difficult to achieve in those with severe obesity. In some cases, medications, devices and surgical management might be considered.  What can you do?  If you are overweight or obese and are interested in methods for weight loss, you should discuss this with your provider.     Consider reducing daily calorie intake by 500 calories.     Keep a food journal.     Avoiding skipping meals, consider cutting portions instead.    Diet combined with exercise helps maintain muscle while optimizing fat loss. Strength training is particularly important for building and maintaining muscle mass. Exercise helps reduce stress, increase energy, and improves fitness. Increasing exercise without diet control, however, may not burn enough calories to loose weight.       Start walking three days a week 10-20 minutes at a time    Work towards walking thirty minutes five days a week     Eventually, increase the speed of your walking for 1-2 minutes at time    And look into health and wellness programs that may be available through your health insurance provider,  employer, local community center, or cassy club.    Weight management plan: Discussed healthy diet and exercise guidelines

## 2020-12-02 NOTE — PROGRESS NOTES
"Gregory Aguilera is a 50 year old male who is being evaluated via a billable video visit.      The patient has been notified of following:     \"This video visit will be conducted via a call between you and your physician/provider. We have found that certain health care needs can be provided without the need for an in-person physical exam.  This service lets us provide the care you need with a video conversation.  If a prescription is necessary we can send it directly to your pharmacy.  If lab work is needed we can place an order for that and you can then stop by our lab to have the test done at a later time.    Video visits are billed at different rates depending on your insurance coverage.  Please reach out to your insurance provider with any questions.    If during the course of the call the physician/provider feels a video visit is not appropriate, you will not be charged for this service.\"    Patient has given verbal consent for Video visit? Yes  How would you like to obtain your AVS? MyChart  If you are dropped from the video visit, the video invite should be resent to: Text to cell phone: [phone]  Will anyone else be joining your video visit? No        Video-Visit Details    Type of service:  Video Visit    Video Start Time: 1:31 PM  Video End Time: 1:51 PM    Originating Location (pt. Location): Home    Distant Location (provider location):  Home Office     Platform used for Video Visit: Ghislaine Juarez MD    Chief complaint: Follow-up home sleep apnea test    History of Present Illness: 50-year-old gentleman with history of hypertension, heartburn, loud snoring.  He was evaluated possible obstructive sleep apnea by home sleep apnea testing.  Those results were reviewed with him in detail.  They show moderately severe sleep with overall AHI 25.9 events per hour, supine AHI 34.  Lowest O2 sat 75%, time spent below 89% 37 minutes.    He denies new sleep concerns.  No other concerns or " procedures.  He does have a history of excessive gag reflex with the use of a mouthguard..  He is interested in a CPAP trial.    Total score - [place]: 2 (12/2/2020  1:00 PM) (Less than 10 normal)    MASSIEL Total Score: 3 (normal 0-7, mild 8-14, moderate 15-21, severe 22-28)    Past Medical History:   Diagnosis Date     Adhesive capsulitis of left shoulder 7/1/2020     Allergic rhinitis, cause unspecified     Allergic rhinitis     Essential hypertension, benign      History of blood transfusion      Other chronic allergic conjunctivitis     Allergic conjunctivitis     Tubular adenoma x3 09/20/2019    Surveillance colonoscopy recommended 9/2022       Allergies   Allergen Reactions     Dust Mites      Grass      Mold      No Known Drug Allergies      Trees        Current Outpatient Medications   Medication     acetaminophen (TYLENOL) 325 MG tablet     Alum Hydroxide-Mag Carbonate (GAVISCON PO)     aspirin (ASA) 81 MG EC tablet     atorvastatin (LIPITOR) 20 MG tablet     baclofen (LIORESAL) 10 MG tablet     fluticasone (FLOVENT DISKUS) 50 MCG/BLIST AEPB     ibuprofen (ADVIL/MOTRIN) 200 MG tablet     lisinopril (ZESTRIL) 20 MG tablet     loratadine (CLARITIN REDITABS) 10 MG dispersible tablet     omeprazole (PRILOSEC) 20 MG DR capsule     Probiotic Product (PROBIOTIC DAILY PO)     Wheat Dextrin (BENEFIBER DRINK MIX PO)     No current facility-administered medications for this visit.        Social History     Socioeconomic History     Marital status:      Spouse name: Not on file     Number of children: 2     Years of education: Not on file     Highest education level: Not on file   Occupational History     Occupation:    Social Needs     Financial resource strain: Not on file     Food insecurity     Worry: Not on file     Inability: Not on file     Transportation needs     Medical: Not on file     Non-medical: Not on file   Tobacco Use     Smoking status: Never Smoker     Smokeless tobacco: Never Used    Substance and Sexual Activity     Alcohol use: Yes     Comment: couple drinks a week     Drug use: No     Sexual activity: Yes     Partners: Female     Birth control/protection: Condom   Lifestyle     Physical activity     Days per week: Not on file     Minutes per session: Not on file     Stress: Not on file   Relationships     Social connections     Talks on phone: Not on file     Gets together: Not on file     Attends Uats[employer] service: Not on file     Active member of club or organization: Not on file     Attends meetings of clubs or organizations: Not on file     Relationship status: Not on file     Intimate partner violence     Fear of current or ex partner: Not on file     Emotionally abused: Not on file     Physically abused: Not on file     Forced sexual activity: Not on file   Other Topics Concern     Parent/sibling w/ CABG, MI or angioplasty before 65F 55M? No   Social History Narrative    Dairy/d 2-3 servings/d     Caffeine 1-2 servings/d    Exercise 2 x week-gym regularly treadmill/hockey    Sunscreen used - No    Seatbelts used - Yes    Working smoke/CO detectors in the home - Yes    Guns stored in the home - No    Self Breast Exams - NA    Self Testicular Exam - yes    Eye Exam up to date - Yes   2008    Dental Exam up to date - Yes  2008    Pap Smear up to date - NA    Mammogram up to date - NA    PSA up to date - No    Dexa Scan up to date - NA    Flex Sig / Colonoscopy up to date - No    Immunizations up to date - 2006 td    Abuse: Current or Past(Physical, Sexual or Emotional)- No    Do you feel safe in your environment - Yes    Updated 5/2009       Family History   Problem Relation Age of Onset     Breast Cancer Mother      Arthritis Father      Genitourinary Problems Father         stones     Cancer Father         skin     C.A.D. Father         stents/age late 60s     Coronary Artery Disease Father         Stents     Prostate Cancer Father         late 50s     Snoring Father      Asthma Sister  "     Depression Sister      Cancer Paternal Grandfather         ? type     Eye Disorder Paternal Grandfather         cateracts         EXAM:  Ht 1.727 m (5' 8\")   Wt 83.9 kg (185 lb)   BMI 28.13 kg/m    GENERAL: Healthy, alert and no distress  EYES: Eyes grossly normal to inspection.  No discharge or erythema, or obvious scleral/conjunctival abnormalities.  RESP: No audible wheeze, cough, or visible cyanosis.  No visible retractions or increased work of breathing.    SKIN: Visible skin clear. No significant rash, abnormal pigmentation or lesions.  NEURO: Cranial nerves grossly intact.  Mentation and speech appropriate for age.  PSYCH: Mentation appears normal, affect normal/bright, judgement and insight intact, normal speech and appearance well-groomed.       HSAT  11/16/2020  Weight 198 lbs BMI 29.18  AHI 25.9, Lowest O2 SAT 75%      ASSESSMENT:  50-year-old gentleman with history of hypertension, loud snoring. found to have at least moderately severe obstructive sleep apnea  Given his history of hypertension and severity of sleep apnea treatment is medically indicated to reduce cardiovascular risk.    PLAN:  We reviewed the indications for treatment and treatment options particularly CPAP and oral appliance.  Generating orders for auto titrating CPAP, sleep therapy management program.  We discussed that he could be a candidate for oral appliance therapy in the future however given the pandemic, severity of sleep apnea, recommended CPAP first.  Reviewed the importance of finding a comfortable and well fitted mask, also reviewed the importance of cleaning supplies.       Lauren Juarez M.D.  Pulmonary/Critical Care/Sleep Medicine    [facility]   Floor 1, [address]   [address] [place] [address]. [place] [address]   Appointments: [phone]    The above note was dictated using voice recognition software and may include typographical errors. Please " contact the author for any clarifications.

## 2020-12-03 ENCOUNTER — VIRTUAL VISIT (OUTPATIENT)
Dept: SLEEP MEDICINE | Facility: CLINIC | Age: 50
End: 2020-12-03
Payer: COMMERCIAL

## 2020-12-03 DIAGNOSIS — E66.3 OVERWEIGHT (BMI 25.0-29.9): ICD-10-CM

## 2020-12-03 DIAGNOSIS — G47.33 OSA (OBSTRUCTIVE SLEEP APNEA): Primary | Chronic | ICD-10-CM

## 2020-12-03 DIAGNOSIS — R06.83 SNORING: ICD-10-CM

## 2020-12-03 PROCEDURE — 99214 OFFICE O/P EST MOD 30 MIN: CPT | Mod: GT | Performed by: INTERNAL MEDICINE

## 2020-12-03 NOTE — LETTER
"    12/3/2020         RE: Gregory Aguilera  [address]  [facility] [address]        Dear Colleague,    Thank you for referring your patient, Gregory Aguilera, to the [facility] [place]. Please see a copy of my visit note below.    Gregory Aguilera is a 50 year old male who is being evaluated via a billable video visit.      The patient has been notified of following:     \"This video visit will be conducted via a call between you and your physician/provider. We have found that certain health care needs can be provided without the need for an in-person physical exam.  This service lets us provide the care you need with a video conversation.  If a prescription is necessary we can send it directly to your pharmacy.  If lab work is needed we can place an order for that and you can then stop by our lab to have the test done at a later time.    Video visits are billed at different rates depending on your insurance coverage.  Please reach out to your insurance provider with any questions.    If during the course of the call the physician/provider feels a video visit is not appropriate, you will not be charged for this service.\"    Patient has given verbal consent for Video visit? Yes  How would you like to obtain your AVS? MyChart  If you are dropped from the video visit, the video invite should be resent to: Text to cell phone: [phone]  Will anyone else be joining your video visit? No        Video-Visit Details    Type of service:  Video Visit    Video Start Time: 1:31 PM  Video End Time: 1:51 PM    Originating Location (pt. Location): Home    Distant Location (provider location):  Home Office     Platform used for Video Visit: Ghislaine Juarez MD    Chief complaint: Follow-up home sleep apnea test    History of Present Illness: 50-year-old gentleman with history of hypertension, heartburn, loud snoring.  He was evaluated possible obstructive sleep apnea by home " sleep apnea testing.  Those results were reviewed with him in detail.  They show moderately severe sleep with overall AHI 25.9 events per hour, supine AHI 34.  Lowest O2 sat 75%, time spent below 89% 37 minutes.    He denies new sleep concerns.  No other concerns or procedures.  He does have a history of excessive gag reflex with the use of a mouthguard..  He is interested in a CPAP trial.    Total score - [place]: 2 (12/2/2020  1:00 PM) (Less than 10 normal)    MASSIEL Total Score: 3 (normal 0-7, mild 8-14, moderate 15-21, severe 22-28)    Past Medical History:   Diagnosis Date     Adhesive capsulitis of left shoulder 7/1/2020     Allergic rhinitis, cause unspecified     Allergic rhinitis     Essential hypertension, benign      History of blood transfusion      Other chronic allergic conjunctivitis     Allergic conjunctivitis     Tubular adenoma x3 09/20/2019    Surveillance colonoscopy recommended 9/2022       Allergies   Allergen Reactions     Dust Mites      Grass      Mold      No Known Drug Allergies      Trees        Current Outpatient Medications   Medication     acetaminophen (TYLENOL) 325 MG tablet     Alum Hydroxide-Mag Carbonate (GAVISCON PO)     aspirin (ASA) 81 MG EC tablet     atorvastatin (LIPITOR) 20 MG tablet     baclofen (LIORESAL) 10 MG tablet     fluticasone (FLOVENT DISKUS) 50 MCG/BLIST AEPB     ibuprofen (ADVIL/MOTRIN) 200 MG tablet     lisinopril (ZESTRIL) 20 MG tablet     loratadine (CLARITIN REDITABS) 10 MG dispersible tablet     omeprazole (PRILOSEC) 20 MG DR capsule     Probiotic Product (PROBIOTIC DAILY PO)     Wheat Dextrin (BENEFIBER DRINK MIX PO)     No current facility-administered medications for this visit.        Social History     Socioeconomic History     Marital status:      Spouse name: Not on file     Number of children: 2     Years of education: Not on file     Highest education level: Not on file   Occupational History     Occupation:    Social Needs      Financial resource strain: Not on file     Food insecurity     Worry: Not on file     Inability: Not on file     Transportation needs     Medical: Not on file     Non-medical: Not on file   Tobacco Use     Smoking status: Never Smoker     Smokeless tobacco: Never Used   Substance and Sexual Activity     Alcohol use: Yes     Comment: couple drinks a week     Drug use: No     Sexual activity: Yes     Partners: Female     Birth control/protection: Condom   Lifestyle     Physical activity     Days per week: Not on file     Minutes per session: Not on file     Stress: Not on file   Relationships     Social connections     Talks on phone: Not on file     Gets together: Not on file     Attends Anabaptist service: Not on file     Active member of club or organization: Not on file     Attends meetings of clubs or organizations: Not on file     Relationship status: Not on file     Intimate partner violence     Fear of current or ex partner: Not on file     Emotionally abused: Not on file     Physically abused: Not on file     Forced sexual activity: Not on file   Other Topics Concern     Parent/sibling w/ CABG, MI or angioplasty before 65F 55M? No   Social History Narrative    Dairy/d 2-3 servings/d     Caffeine 1-2 servings/d    Exercise 2 x week-gym regularly treadmill/hockey    Sunscreen used - No    Seatbelts used - Yes    Working smoke/CO detectors in the home - Yes    Guns stored in the home - No    Self Breast Exams - NA    Self Testicular Exam - yes    Eye Exam up to date - Yes   2008    Dental Exam up to date - Yes  2008    Pap Smear up to date - NA    Mammogram up to date - NA    PSA up to date - No    Dexa Scan up to date - NA    Flex Sig / Colonoscopy up to date - No    Immunizations up to date - 2006 td    Abuse: Current or Past(Physical, Sexual or Emotional)- No    Do you feel safe in your environment - Yes    Updated 5/2009       Family History   Problem Relation Age of Onset     Breast Cancer Mother       "Arthritis Father      Genitourinary Problems Father         stones     Cancer Father         skin     C.A.D. Father         stents/age late 60s     Coronary Artery Disease Father         Stents     Prostate Cancer Father         late 50s     Snoring Father      Asthma Sister      Depression Sister      Cancer Paternal Grandfather         ? type     Eye Disorder Paternal Grandfather         cateracts         EXAM:  Ht 1.727 m (5' 8\")   Wt 83.9 kg (185 lb)   BMI 28.13 kg/m    GENERAL: Healthy, alert and no distress  EYES: Eyes grossly normal to inspection.  No discharge or erythema, or obvious scleral/conjunctival abnormalities.  RESP: No audible wheeze, cough, or visible cyanosis.  No visible retractions or increased work of breathing.    SKIN: Visible skin clear. No significant rash, abnormal pigmentation or lesions.  NEURO: Cranial nerves grossly intact.  Mentation and speech appropriate for age.  PSYCH: Mentation appears normal, affect normal/bright, judgement and insight intact, normal speech and appearance well-groomed.       HSAT  11/16/2020  Weight 198 lbs BMI 29.18  AHI 25.9, Lowest O2 SAT 75%      ASSESSMENT:  50-year-old gentleman with history of hypertension, loud snoring. found to have at least moderately severe obstructive sleep apnea  Given his history of hypertension and severity of sleep apnea treatment is medically indicated to reduce cardiovascular risk.    PLAN:  We reviewed the indications for treatment and treatment options particularly CPAP and oral appliance.  Generating orders for auto titrating CPAP, sleep therapy management program.  We discussed that he could be a candidate for oral appliance therapy in the future however given the pandemic, severity of sleep apnea, recommended CPAP first.  Reviewed the importance of finding a comfortable and well fitted mask, also reviewed the importance of cleaning supplies.       Lauren Juarez M.D.  Pulmonary/Critical Care/Sleep Medicine    [facility] " [facility]   Floor 1, [address]   [address] [place] [address]   Appointments: [phone]    The above note was dictated using voice recognition software and may include typographical errors. Please contact the author for any clarifications.                    Again, thank you for allowing me to participate in the care of your patient.        Sincerely,        Lauren Juarez MD

## 2021-01-06 ENCOUNTER — TELEPHONE (OUTPATIENT)
Dept: SLEEP MEDICINE | Facility: CLINIC | Age: 51
End: 2021-01-06

## 2021-01-06 NOTE — TELEPHONE ENCOUNTER
Reason for call:  Other   Patient called regarding (reason for call): call back  Additional comments: Per patient: I wasn't sure what the next steps where to getting my cpap machine and getting an order to pick it up. If someone can give me a call and let me know what I should, that'll be great.    Phone number to reach patient:  Cell number on file:    Telephone Information:   Mobile [phone]       Best Time:  Anytime    Can we leave a detailed message on this number?  YES    Travel screening: Not Applicable

## 2021-01-13 NOTE — TELEPHONE ENCOUNTER
Patient has been contacted by [facility] for setup. Patient being setup 1/14/2021, he will follow up with Dr. Juarez 2/24/2021.    Ashely Castillo MA

## 2021-01-15 ENCOUNTER — DOCUMENTATION ONLY (OUTPATIENT)
Dept: SLEEP MEDICINE | Facility: CLINIC | Age: 51
End: 2021-01-15

## 2021-01-15 NOTE — PROGRESS NOTES
Patient was offered choice of vendor and chose [facility].  Patient Gregory Aguilera was set up at [place] on January 15, 2021. Patient received a Resmed AirSense 10 Auto. Pressures were set at 5-15 cm H2O.   Patient s ramp is 5 cm H2O for Off and FLEX/EPR is EPR, 2.  Patient received a Resmed Mask name: Airfit [id number]  Nasal mask size Large cushion, heated tubing and heated humidifier.  Patient does need to meet compliance. Patient was informed to schedule follow up with Dr. Juarez.    Brenna Fraga

## 2021-01-19 ENCOUNTER — DOCUMENTATION ONLY (OUTPATIENT)
Dept: SLEEP MEDICINE | Facility: CLINIC | Age: 51
End: 2021-01-19
Payer: COMMERCIAL

## 2021-01-19 NOTE — PROGRESS NOTES
3 DAY STM VISIT    Diagnostic AHI:  25.9 HST    Patient contacted for 3 day STM visit.    Confirmed with patient at time of call- Yes Patient is still interested in STM service     Subjective measures:  Patient had dry sinus prior to CPAP.  Patient doing good with CPAP not sure if he is feeling any benefit of CPAP.     Replacement device: No  STM ordered by provider: Yes     Device type: Auto-CPAP  PAP settings from order::  CPAP min 5 cm  H20       CPAP max 15 cm  H20  Mask type:    Nasal Mask     Device settings from machine      Min CPAP 5.0            Max CPAP 15.0          EPR level Setting: TWO      RESMED Soft response setting:  OFF  Assessment: Nightly usage, most nights over four hours.  Instructed patient how to increase his humidity.   Action plan: Patient to have 14 day STM visit. Patient has a follow up visit scheduled:   yes within 31-90 days of set up.     Total time spent on accessing and  interpreting remote patient PAP therapy data  10 minutes  Total time spent counseling, coaching  and reviewing PAP therapy data with patient  4 minutes  [id number] no

## 2021-01-30 DIAGNOSIS — K21.9 GASTROESOPHAGEAL REFLUX DISEASE WITHOUT ESOPHAGITIS: ICD-10-CM

## 2021-02-02 ENCOUNTER — DOCUMENTATION ONLY (OUTPATIENT)
Dept: SLEEP MEDICINE | Facility: CLINIC | Age: 51
End: 2021-02-02
Payer: COMMERCIAL

## 2021-02-02 NOTE — TELEPHONE ENCOUNTER
1st attempt. [employer] message sent to patient requesting he schedule his follow up with Dr Cordero.      Lakia Cespedes  Surgical Specialties Procedure   [facility] [place]  2/2/2021 3:22 PM

## 2021-02-02 NOTE — PROGRESS NOTES
14  DAY STM VISIT    Diagnostic AHI:  25.9 HST    Subjective measures:   Patient stated his wife says he has stopped snoring.  Patient wasn't tired before he went on CPAP and it is hard to tell if he is benefiting from it.    Assessment: Pt meeting objective benchmarks.  Patient failing following subjective benchmarks: not feeling benefit from therapy    Action plan: pt to have 30 day STM visit.      Device type: Auto-CPAP    PAP settings: CPAP min 5.0 cm  H20       CPAP max 15.0 cm  H20      95th% pressure 10.1 cm  H20        RESMED EPR level Setting: TWO    RESMED Soft response setting:  OFF    Mask type:  Nasal Mask    Objective measures: 14 day rolling measures      Compliance  100 %      Leak  15.52  lpm  last  upload      AHI 0.67   last  upload      Average number of minutes 370      Objective measure goal  Compliance   Goal >70%  Leak   Goal < 24 lpm  AHI  Goal < 5  Usage  Goal >240        Total time spent on accessing and interpreting remote patient PAP therapy data  10 minutes    Total time spent counseling, coaching  and reviewing PAP therapy data with patient  4 minutes    [id number]  [id number]  no (3 day STM)

## 2021-02-02 NOTE — TELEPHONE ENCOUNTER
baclofen (LIORESAL) 10 MG tablet   Last Written Prescription Date:  11/9/20  Last Fill Quantity: 30,   # refills: 3  Last Office Visit : 10/7/20  Future Office visit:  Recommended 2 months    Routing refill request to provider for review/approval because:  Drug not on the  [facility] refill protocol. Appt recommended at 2 months. Scheduling has been notified to contact the pt for appointment.

## 2021-02-03 RX ORDER — BACLOFEN 10 MG/1
5 TABLET ORAL 2 TIMES DAILY
Qty: 30 TABLET | Refills: 3 | Status: SHIPPED | OUTPATIENT
Start: 2021-02-03 | End: 2022-03-02

## 2021-02-09 NOTE — TELEPHONE ENCOUNTER
Patient read [employer] message advising him schedule his follow up with Dr Cordero.    Lakia Cespedes  Surgical Specialties Procedure   [facility] [place]  2/9/2021 2:57 PM

## 2021-02-16 ENCOUNTER — DOCUMENTATION ONLY (OUTPATIENT)
Dept: SLEEP MEDICINE | Facility: CLINIC | Age: 51
End: 2021-02-16
Payer: COMMERCIAL

## 2021-02-16 NOTE — PROGRESS NOTES
30 DAY STM VISIT    Diagnostic AHI:  25.9 HST    Data only recheck     Assessment: Pt meeting objective benchmarks.     Action plan: pt to have 6 month STM visit  Patient has scheduled a follow up visit with Dr. Juarez on 2/24/2021.   Device type: Auto-CPAP  PAP settings: CPAP min 5.0 cm  H20     CPAP max 15.0 cm  H20    95th% pressure 10.4 cm  H20      RESMED EPR level Setting: TWO    RESMED Soft response setting:  OFF  Mask type:  Nasal Mask  Objective measures: 14 day rolling measures      Compliance  100 %      Leak  19.52 lpm  last  upload      AHI 0.98   last  upload      Average number of minutes 446      Objective measure goal  Compliance   Goal >70%  Leak   Goal < 24 lpm  AHI  Goal < 5  Usage  Goal >240        Total time spent on accessing and interpreting remote patient PAP therapy data  10 minutes    Total time spent counseling, coaching  and reviewing PAP therapy data with patient  0 minutes     [id number] this call  [id number] no  at 3 or 14 day [facility]

## 2021-02-22 VITALS — HEIGHT: 68 IN | BODY MASS INDEX: 29.55 KG/M2 | WEIGHT: 195 LBS

## 2021-02-22 ASSESSMENT — MIFFLIN-ST. JEOR: SCORE: 1696.07

## 2021-02-22 NOTE — PROGRESS NOTES
Hemant is a 51 year old who is being evaluated via a billable video visit.      How would you like to obtain your AVS? MyChart  If the video visit is dropped, the invitation should be resent by: Text to cell phone:   Will anyone else be joining your video visit? No        Video-Visit Details    Type of service:  Video Visit    Video Start Time: 9:32 AM    Video End Time:9:47 AM    Originating Location (pt. Location): Home    Distant Location (provider location):  [facility]    Platform used for Video Visit: [employer]     Chief complaint: Follow-up sleep apnea and CPAP    History of Present Illness: 51-year-old gentleman with history of hypertension, reflux and loud snoring.  He is found to have at least moderately severe obstructive sleep apnea recently.  He started on CPAP therapy.  He has been able to use it nightly.  He reports that his wife has observed resolution of snoring completely.  Otherwise he does not feel particularly different during the day.  He denies daytime drowsiness.  One thing he did notice is that he is no longer needing to get up at night to go to the bathroom.  Sometimes it would be multiple times but now it is none.  He usually is able to wake up spontaneously but sometimes needs to set an alarm in order to attend earlier meeting.  He has been on medication for reflux and has not been suffering nighttime symptoms for a long time.  He has a history of excessive gag reflex and was not interested in oral appliance.  He is hoping to get off blood pressure medications as well as PPI.  He has gained some weight over the winter.  He does not have a blood pressure cuff at home to measure blood pressure.    [place] Sleepiness Scale  Total score - [place]: 0 (2/22/2021 11:00 AM)   (Less than 10 normal)    Insomnia Severity Scale  MASSIEL Total Score: 1  (normal 0-7, mild 8-14, moderate 15-21, severe 22-28)    Past Medical History:   Diagnosis Date      Adhesive capsulitis of left shoulder 7/1/2020     Allergic rhinitis, cause unspecified     Allergic rhinitis     Essential hypertension, benign      History of blood transfusion      Other chronic allergic conjunctivitis     Allergic conjunctivitis     Tubular adenoma x3 09/20/2019    Surveillance colonoscopy recommended 9/2022       Allergies   Allergen Reactions     Dust Mites      Grass      Mold      No Known Drug Allergies      Trees        Current Outpatient Medications   Medication     acetaminophen (TYLENOL) 325 MG tablet     Alum Hydroxide-Mag Carbonate (GAVISCON PO)     aspirin (ASA) 81 MG EC tablet     atorvastatin (LIPITOR) 20 MG tablet     baclofen (LIORESAL) 10 MG tablet     fluticasone (FLOVENT DISKUS) 50 MCG/BLIST AEPB     ibuprofen (ADVIL/MOTRIN) 200 MG tablet     lisinopril (ZESTRIL) 20 MG tablet     loratadine (CLARITIN REDITABS) 10 MG dispersible tablet     omeprazole (PRILOSEC) 20 MG DR capsule     Probiotic Product (PROBIOTIC DAILY PO)     Wheat Dextrin (BENEFIBER DRINK MIX PO)     No current facility-administered medications for this visit.        Social History     Socioeconomic History     Marital status:      Spouse name: Not on file     Number of children: 2     Years of education: Not on file     Highest education level: Not on file   Occupational History     Occupation:    Social Needs     Financial resource strain: Not on file     Food insecurity     Worry: Not on file     Inability: Not on file     Transportation needs     Medical: Not on file     Non-medical: Not on file   Tobacco Use     Smoking status: Never Smoker     Smokeless tobacco: Never Used   Substance and Sexual Activity     Alcohol use: Yes     Comment: couple drinks a week     Drug use: No     Sexual activity: Yes     Partners: Female     Birth control/protection: Condom   Lifestyle     Physical activity     Days per week: Not on file     Minutes per session: Not on file     Stress: Not on file  "  Relationships     Social connections     Talks on phone: Not on file     Gets together: Not on file     Attends Judaism service: Not on file     Active member of club or organization: Not on file     Attends meetings of clubs or organizations: Not on file     Relationship status: Not on file     Intimate partner violence     Fear of current or ex partner: Not on file     Emotionally abused: Not on file     Physically abused: Not on file     Forced sexual activity: Not on file   Other Topics Concern     Parent/sibling w/ CABG, MI or angioplasty before 65F 55M? No   Social History Narrative    Dairy/d 2-3 servings/d     Caffeine 1-2 servings/d    Exercise 2 x week-gym regularly treadmill/hockey    Sunscreen used - No    Seatbelts used - Yes    Working smoke/CO detectors in the home - Yes    Guns stored in the home - No    Self Breast Exams - NA    Self Testicular Exam - yes    Eye Exam up to date - Yes   2008    Dental Exam up to date - Yes  2008    Pap Smear up to date - NA    Mammogram up to date - NA    PSA up to date - No    Dexa Scan up to date - NA    Flex Sig / Colonoscopy up to date - No    Immunizations up to date - 2006 td    Abuse: Current or Past(Physical, Sexual or Emotional)- No    Do you feel safe in your environment - Yes    Updated 5/2009       Family History   Problem Relation Age of Onset     Breast Cancer Mother      Arthritis Father      Genitourinary Problems Father         stones     Cancer Father         skin     C.A.D. Father         stents/age late 60s     Coronary Artery Disease Father         Stents     Prostate Cancer Father         late 50s     Snoring Father      Asthma Sister      Depression Sister      Cancer Paternal Grandfather         ? type     Eye Disorder Paternal Grandfather         cateracts           EXAM:  Ht 1.715 m (5' 7.5\")   Wt 88.5 kg (195 lb)   BMI 30.09 kg/m    GENERAL: Healthy, alert and no distress  EYES: Eyes grossly normal to inspection.  No discharge or " erythema, or obvious scleral/conjunctival abnormalities.  RESP: No audible wheeze, cough, or visible cyanosis.  No visible retractions or increased work of breathing.    SKIN: Visible skin clear. No significant rash, abnormal pigmentation or lesions.  NEURO: Cranial nerves grossly intact.  Mentation and speech appropriate for age.  PSYCH: Mentation appears normal, affect normal/bright, judgement and insight intact, normal speech and appearance well-groomed.       HSAT  11/16/2020  Weight 198 lbs BMI 29.18  AHI 25.9, Lowest O2 SAT 75%    ResMed   Auto-PAP 5.0 - 15.0 cmH2O 30 day usage data:    96% of days with > 4 hours of use. 1/30 days with no use.   Average use 416 minutes per day.   95%ile Leak 19.56 L/min.   CPAP 95% pressure 10.5 cm.   AHI 0.95 events per hour.     ASSESSMENT:  51-year-old gentleman with history of being overweight, hypertension, loud snoring, reflux.  Found to have moderately severe obstructive sleep apnea.  He is getting good clinical benefit with normalization of AHI and meeting compliance goals with CPAP.    PLAN:  Patient is counseled on the importance of can continue to use CPAP all night every night for medical benefits and for improved sleep quality.  He is encouraged to ensure 7 to 8 hours nightly sleep opportunity.  Consider getting a blood pressure cuff to monitor blood pressures at home at various times of the day.  Can discuss blood pressure management and whether he can discontinue medications with primary care.  Patient's was counseled on the importance of taking his device with him should he go to any procedures requiring sedation.  He is urged to continue efforts at weight loss.  We discussed the process of reassessing sleep apnea should he have significant and sustained with weight loss of 10% or more.  Recommended he follow-up in sleep medicine in 1 year.  Please see patient instructions for further details of counseling provided.    25 minutes spent on the date of the  encounter doing chart review, history and exam, documentation and further activities as noted above    Lauren Juarez M.D.  Pulmonary/Critical Care/Sleep Medicine    [facility]   Floor 1, [address]   [address] [place] [address]   Appointments: [phone]    The above note was dictated using voice recognition software and may include typographical errors. Please contact the author for any clarifications.

## 2021-02-24 ENCOUNTER — VIRTUAL VISIT (OUTPATIENT)
Dept: SLEEP MEDICINE | Facility: CLINIC | Age: 51
End: 2021-02-24
Payer: COMMERCIAL

## 2021-02-24 DIAGNOSIS — I10 HYPERTENSION GOAL BP (BLOOD PRESSURE) < 140/90: ICD-10-CM

## 2021-02-24 DIAGNOSIS — E66.3 OVERWEIGHT (BMI 25.0-29.9): ICD-10-CM

## 2021-02-24 DIAGNOSIS — G47.33 OSA (OBSTRUCTIVE SLEEP APNEA): Primary | Chronic | ICD-10-CM

## 2021-02-24 PROBLEM — R06.83 SNORING: Status: RESOLVED | Noted: 2020-10-09 | Resolved: 2021-02-24

## 2021-02-24 PROCEDURE — 99213 OFFICE O/P EST LOW 20 MIN: CPT | Mod: GT | Performed by: INTERNAL MEDICINE

## 2021-02-24 NOTE — PATIENT INSTRUCTIONS
For general sleep health questions:   http://sleepeducation.org    For tips about PAP and COVID-19:  https://www.thoracic.org/patients/patient-resources/resources/covid-19-and-home-pap-therapy.pdf    For general info about COVID-19 including vaccines:  https://[employer].org/covid19        Continue PAP therapy every night, for all hours that you are sleeping (including naps.)  As always, try to get at least 8 hours of sleep or more each day, keep a regular sleep schedule, and avoid sleep deprivation. Avoid alcohol.    Reasons that you might need a change to your pressure therapy would be weight gain or loss, waking having inadvertently removed your PAP overnight, having previously felt refreshed by sleep with CPAP use and now waking un-refreshed, and return of daytime sleepiness. Also, the development of new medical problems  (such as heart failure, stroke, medications such as narcotics) can sometimes affect breathing at night and change your PAP therapy needs.    Please bring PAP with you if you are hospitalized.  If anticipating surgery be sure to discuss with your surgeon that you have sleep apnea and use PAP therapy.      Maintain your equipment as recommended which includes routine cleaning and replacement of supplies.      Call DME for any questions regarding supplies or maintenance.    [facility], [facility] [phone]      Do not drive on engage in potentially dangerous activities if feeling sleepy.    Please follow up in sleep clinic again in 12 months.        Tips for your PAP use-    Mask fitting tips  Mask fitting exercise:    To improve your mask seal and your mobility at night, put mask on and secure in place.  Lie down in bed with full pressure and roll to one side, adjust headgear while in that position to eliminate any leaks. Repeat process rolling to other side.     The mask seal does not have to be perfect:   CPAP machines are designed to make up  for small leaks. However, you will not tolerate leaks blowing in your eyes so you will need to adjust.   Any leak should only be near or at the bottom of the mask.  We expect your mask to leak slightly at night.    Do not over-tighten the headgear straps, tighter IS NOT better, we expect minimal leak.    First try re-positioning the mask or headgear before tightening the headgear straps.  Mask leaks are expected due to changing sleeping positions. Try pulling the mask away from your skin allowing the cushion to re-inflate will minimize the leak.  If you struggle for a good fit, try turning the CPAP off and then readjust the mask by pulling it away from your face and then turning back on the CPAP.        Humidifier tips  Humidifiers can be adjusted to increase or decrease the amount of moisture according to your comfort level. You may need to adjust this frequently at first, but then might only change it with seasonal weather changes.     Try INCREASING the humidity if:  You experience a dry, irritated nasal passage or throat.  You have a runny, drippy nose or sneezing fits after using CPAP.  You experience nasal congestion during or after CPAP use.    Try DECREASING the humidity if:  You have excessive condensation or  rain out  in the tubing or mask.  Otherwise keep the tubing warm during the night by running it underneath the blankets or pillow.      Clinic visit after initial PAP set-up   Bring your equipment with you to your 5-8 week follow up clinic visit.  We will be extracting your data from the machine if not available from the cloud based modem.        Travel  Always take your equipment with you when you travel.  If you fly with your equipment bring it on with you as a carry on.  Medical equipment does not count as a carry on.    If you travel international the machines take 110-240v.  The only adapter needed is the adapter that will fit into the receptacle (outlet).    You may also want to bring an  extension cord as many hotel rooms have limited outlets at the bedside.  Do not travel with water in your humidifier chamber.     Cleaning and Maintenance Guidelines    Equipment Frequency Cleaning Method   Mask First Day    Daily      Weekly Soak mask in hot soapy water for 30 minutes, rinse and air dry.  Wipe nasal cushion with a hot soapy (Ivory, baby shampoo) cloth and rinse.  Baby wipes may also be used.  Do not use anti-bacterial soaps,Emelia  liquid soap, rubbing alcohol, bleach or ammonia.  Wash frame in hot soapy water (Ivory, baby shampoo) rinse and let air dry   Headgear Biweekly Wash in hot soapy water, rinse and air dry   Reusable Gray Filter Weekly Wash in hot soapy water, rinse, put in towel squeeze moisture out, let air dry   Disposable White Filter Check Weekly Replace when brown or gray in color; at least every 2 to 3 months   Humidifier Chamber Daily    Weekly Empty distilled water from humidifier and let air dry    Hand wash in hot soapy water, rinse and air dry   Tubing Weekly Wash in hot soapy water, rinse and let air dry   Mask, Tubing and Humidifier Chamber As needed Disinfect: Soak in 1 part distilled white vinegar to 3 parts hot water for 30 minutes, rinse well and air dry  Not the material headgear        MASK AND SUPPLY REORDERING and EQUIPMENT NEEDS through your DME and per your insurance  Reminder: Most insurance companies will allow for a new mask, headgear, tubing, and reusable gray filter every six months.  Disposable white ultra-fine filters are covered monthly.      HOME AND SAFETY INSTRUCTIONS    Do not use frayed or cracked electrical cords, multi plug adaptors, or switched receptacles    Do not immerse electrical equipment into water    Assure that electrical cords do not become a tripping hazard

## 2021-03-12 ENCOUNTER — OFFICE VISIT (OUTPATIENT)
Dept: FAMILY MEDICINE | Facility: CLINIC | Age: 51
End: 2021-03-12
Payer: COMMERCIAL

## 2021-03-12 VITALS
SYSTOLIC BLOOD PRESSURE: 116 MMHG | HEIGHT: 68 IN | HEART RATE: 76 BPM | BODY MASS INDEX: 30.01 KG/M2 | OXYGEN SATURATION: 97 % | WEIGHT: 198 LBS | TEMPERATURE: 98.5 F | DIASTOLIC BLOOD PRESSURE: 65 MMHG | RESPIRATION RATE: 16 BRPM

## 2021-03-12 DIAGNOSIS — R21 RASH: ICD-10-CM

## 2021-03-12 DIAGNOSIS — Z00.00 ROUTINE GENERAL MEDICAL EXAMINATION AT A HEALTH CARE FACILITY: Primary | ICD-10-CM

## 2021-03-12 DIAGNOSIS — R35.0 URINARY FREQUENCY: ICD-10-CM

## 2021-03-12 DIAGNOSIS — I10 HYPERTENSION GOAL BP (BLOOD PRESSURE) < 140/90: ICD-10-CM

## 2021-03-12 DIAGNOSIS — R20.0 THIGH NUMBNESS: ICD-10-CM

## 2021-03-12 PROCEDURE — 99213 OFFICE O/P EST LOW 20 MIN: CPT | Mod: 25 | Performed by: PHYSICIAN ASSISTANT

## 2021-03-12 PROCEDURE — 99396 PREV VISIT EST AGE 40-64: CPT | Performed by: PHYSICIAN ASSISTANT

## 2021-03-12 RX ORDER — CLINDAMYCIN PHOSPHATE 10 UG/ML
LOTION TOPICAL 2 TIMES DAILY
Qty: 60 ML | Refills: 1 | Status: SHIPPED | OUTPATIENT
Start: 2021-03-12 | End: 2021-11-16

## 2021-03-12 ASSESSMENT — ENCOUNTER SYMPTOMS
HEARTBURN: 0
PARESTHESIAS: 1
FEVER: 0
HEADACHES: 0
ARTHRALGIAS: 0
ABDOMINAL PAIN: 0
JOINT SWELLING: 0
DIARRHEA: 0
SHORTNESS OF BREATH: 0
DYSURIA: 0
EYE PAIN: 0
NAUSEA: 0
CHILLS: 0
CONSTIPATION: 0
HEMATOCHEZIA: 0
FREQUENCY: 0
HEMATURIA: 0
MYALGIAS: 0
WEAKNESS: 0
SORE THROAT: 0
DIZZINESS: 0
COUGH: 0
NERVOUS/ANXIOUS: 0
PALPITATIONS: 0

## 2021-03-12 ASSESSMENT — MIFFLIN-ST. JEOR: SCORE: 1709.68

## 2021-03-12 NOTE — PROGRESS NOTES
SUBJECTIVE:   CC: Gregory Aguilera is an 51 year old male who presents for preventative health visit.   Patient has been advised of split billing requirements and indicates understanding: Yes  Healthy Habits:     Getting at least 3 servings of Calcium per day:  Yes    Bi-annual eye exam:  Yes    Dental care twice a year:  Yes    Sleep apnea or symptoms of sleep apnea:  Sleep apnea    Diet:  Regular (no restrictions)    Frequency of exercise:  2-3 days/week    Duration of exercise:  30-45 minutes    Taking medications regularly:  Yes    Medication side effects:  None    PHQ-2 Total Score: 0    Additional concerns today:  No    Ideal weight 160-170 range would like to lose some weight   Diagnosed with PATTI now using CPAP -- this is going well   Wondering if this could control his blood pressure and if he could get off medications   Does not have a blood pressure cuff     Last 3-4 weeks left lateral thigh numbness when laying down  Sometimes burning or tear sensation   Does have h/o laceration to left lateral thigh that was sutured and stapled but numbness not in that exact area   No weakness, gait is fine   Feels like nerves are sensitive or like they fell asleep  Not impacting sleep at all     Faint maculopapular rash to forehead   Wondering if needs to see dermatology    Some urinary frequency when traveling   No problems with nocturia   Stream is normal/strong       Today's PHQ-2 Score:   PHQ-2 ( 1999 Pfizer) 3/12/2021   Q1: Little interest or pleasure in doing things 0   Q2: Feeling down, depressed or hopeless 0   PHQ-2 Score 0   Q1: Little interest or pleasure in doing things Not at all   Q2: Feeling down, depressed or hopeless Not at all   PHQ-2 Score 0       Abuse: Current or Past(Physical, Sexual or Emotional)- No  Do you feel safe in your environment? Yes    Have you ever done Advance Care Planning? (For example, a Health Directive, POLST, or a discussion with a medical provider or your loved ones about your  "wishes): No, advance care planning information given to patient to review.  Advanced care planning was discussed at today's visit.    Social History     Tobacco Use     Smoking status: Never Smoker     Smokeless tobacco: Never Used   Substance Use Topics     Alcohol use: Yes     Comment: couple drinks a week     If you drink alcohol do you typically have >3 drinks per day or >7 drinks per week? No    Alcohol Use 3/12/2021   Prescreen: >3 drinks/day or >7 drinks/week? No   Prescreen: >3 drinks/day or >7 drinks/week? -     Last PSA:   PSA   Date Value Ref Range Status   09/18/2020 0.71 0 - 4 ug/L Final     Comment:     Assay Method:  Chemiluminescence using Siemens Vista analyzer       Reviewed orders with patient. Reviewed health maintenance and updated orders accordingly - Yes      Reviewed and updated as needed this visit by clinical staff   Allergies  Meds              Reviewed and updated as needed this visit by Provider                    Review of Systems   Constitutional: Negative for chills and fever.   HENT: Negative for congestion, ear pain, hearing loss and sore throat.    Eyes: Negative for pain and visual disturbance.   Respiratory: Negative for cough and shortness of breath.    Cardiovascular: Negative for chest pain, palpitations and peripheral edema.   Gastrointestinal: Negative for abdominal pain, constipation, diarrhea, heartburn, hematochezia and nausea.   Genitourinary: Negative for discharge, dysuria, frequency, genital sores, hematuria, impotence and urgency.   Musculoskeletal: Negative for arthralgias, joint swelling and myalgias.   Skin: Positive for rash.   Neurological: Positive for paresthesias. Negative for dizziness, weakness and headaches.   Psychiatric/Behavioral: Negative for mood changes. The patient is not nervous/anxious.      OBJECTIVE:   /65   Pulse 76   Temp 98.5  F (36.9  C) (Oral)   Resp 16   Ht 1.715 m (5' 7.5\")   Wt 89.8 kg (198 lb)   SpO2 97%   BMI 30.55 kg/m  "     Physical Exam  GENERAL: healthy, alert and no distress  EYES: Eyes grossly normal to inspection, PERRL and conjunctivae and sclerae normal  HENT: ear canals and TM's normal, nose and mouth without ulcers or lesions  NECK: no adenopathy, no asymmetry, masses, or scars and thyroid normal to palpation  RESP: lungs clear to auscultation - no rales, rhonchi or wheezes  CV: regular rate and rhythm, normal S1 S2, no S3 or S4, no murmur, click or rub, no peripheral edema and peripheral pulses strong  ABDOMEN: soft, nontender, no hepatosplenomegaly, no masses and bowel sounds normal  MS: no gross musculoskeletal defects noted, no edema  SKIN: no suspicious lesions or rashes  NEURO: Normal strength and tone, mentation intact and speech normal  PSYCH: mentation appears normal, affect normal/bright    Diagnostic Test Results:  Labs reviewed in Epic  No results found for any visits on 03/12/21.    ASSESSMENT/PLAN:   Gregory was seen today for physical.    Diagnoses and all orders for this visit:    Routine general medical examination at a health care facility    Rash  -     clindamycin (CLEOCIN T) 1 % external lotion; Apply topically 2 times daily  -     DERMATOLOGY ADULT REFERRAL; Future    Thigh numbness    Urinary frequency    Hypertension goal BP (blood pressure) < 140/90      Faint scattered macules to forehead area will try course of Cleocin. Follow up with dermatology if not improving.     Thigh numbness past 3-4 weeks lateral left thigh no weakness. Normal exam. Normal strength. Continue to monitor. Could be related to low back. Not overly bothersome right now.    Urinary frequency. Bothersome when traveling. Will avoid liquids if on road trip. Could consider Flomax in the future. Hemant will consider.    Hypertension - wondering about stopping meds since starting CPAP for PATTI. BP well controlled today 116/65. I am OK with trying this in the future but Hemant needs to work on weight loss and increasing activity first.  "Currently taking Lisinopril 20 mg daily.       COUNSELING:   Reviewed preventive health counseling, as reflected in patient instructions       Regular exercise       Healthy diet/nutrition    Estimated body mass index is 30.55 kg/m  as calculated from the following:    Height as of this encounter: 1.715 m (5' 7.5\").    Weight as of this encounter: 89.8 kg (198 lb).     Weight management plan: Discussed healthy diet and exercise guidelines    He reports that he has never smoked. He has never used smokeless tobacco.      Counseling Resources:  ATP IV Guidelines  Pooled Cohorts Equation Calculator  FRAX Risk Assessment  ICSI Preventive Guidelines  Dietary Guidelines for Americans, 2010  USDA's MyPlate  ASA Prophylaxis  Lung CA Screening    Kiet Watkins PA-C  [facility]  "

## 2021-03-16 ENCOUNTER — TELEPHONE (OUTPATIENT)
Dept: DERMATOLOGY | Facility: CLINIC | Age: 51
End: 2021-03-16
Payer: COMMERCIAL

## 2021-03-16 NOTE — TELEPHONE ENCOUNTER
[facility] Call Center    Phone Message    May a detailed message be left on voicemail: yes     Reason for Call: Appointment Intake    Referring Provider Name: KARRIE Watkins  Diagnosis and/or Symptoms: Rash    Action Taken: Message routed to:  [facility] Dermatology    Travel Screening: Not Applicable

## 2021-03-18 ENCOUNTER — TELEPHONE (OUTPATIENT)
Dept: FAMILY MEDICINE | Facility: CLINIC | Age: 51
End: 2021-03-18

## 2021-03-18 NOTE — TELEPHONE ENCOUNTER
"I attempted to get this patient scheduled in our Dermatology Clinic he did declined stating his provider gave him topical medication to try, he will like to see if this will help first before scheduling a appointment with us.    Patient needs to scheduled a appointment as [facility] in Dermatology with any provider for \"Rash\" next available.    Queta Alexandra 03/18/21  "

## 2021-03-23 PROBLEM — M75.02 ADHESIVE CAPSULITIS OF LEFT SHOULDER: Status: RESOLVED | Noted: 2020-07-01 | Resolved: 2021-03-23

## 2021-04-09 ENCOUNTER — IMMUNIZATION (OUTPATIENT)
Dept: NURSING | Facility: CLINIC | Age: 51
End: 2021-04-09
Payer: COMMERCIAL

## 2021-04-09 PROCEDURE — 91300 PR COVID VAC PFIZER DIL RECON 30 MCG/0.3 ML IM: CPT

## 2021-04-09 PROCEDURE — 0001A PR COVID VAC PFIZER DIL RECON 30 MCG/0.3 ML IM: CPT

## 2021-04-30 ENCOUNTER — IMMUNIZATION (OUTPATIENT)
Dept: NURSING | Facility: CLINIC | Age: 51
End: 2021-04-30
Attending: INTERNAL MEDICINE
Payer: COMMERCIAL

## 2021-04-30 PROCEDURE — 91300 PR COVID VAC PFIZER DIL RECON 30 MCG/0.3 ML IM: CPT

## 2021-04-30 PROCEDURE — 0002A PR COVID VAC PFIZER DIL RECON 30 MCG/0.3 ML IM: CPT

## 2021-06-10 ENCOUNTER — PRE VISIT (OUTPATIENT)
Dept: UROLOGY | Facility: CLINIC | Age: 51
End: 2021-06-10

## 2021-06-11 ENCOUNTER — VIRTUAL VISIT (OUTPATIENT)
Dept: UROLOGY | Facility: CLINIC | Age: 51
End: 2021-06-11
Payer: COMMERCIAL

## 2021-06-11 DIAGNOSIS — N41.9 PROSTATITIS, UNSPECIFIED PROSTATITIS TYPE: Primary | ICD-10-CM

## 2021-06-11 PROCEDURE — 99213 OFFICE O/P EST LOW 20 MIN: CPT | Mod: 95 | Performed by: NURSE PRACTITIONER

## 2021-06-11 RX ORDER — SULFAMETHOXAZOLE/TRIMETHOPRIM 800-160 MG
1 TABLET ORAL 2 TIMES DAILY
Qty: 56 TABLET | Refills: 0 | Status: SHIPPED | OUTPATIENT
Start: 2021-06-11 | End: 2021-07-09

## 2021-06-11 NOTE — PROGRESS NOTES
Hemant is a 51 year old who is being evaluated via a billable video visit.      How would you like to obtain your AVS? MyChart  If the video visit is dropped, the invitation should be resent by: Send to e-mail at: [email or web address]  Will anyone else be joining your video visit? No    Video Start Time: 7:33 AM  Video-Visit Details    Type of service:  Video Visit    Video End Time: 7:51 AM    Originating Location (pt. Location): Home    Distant Location (provider location):  [facility] [place]     Platform used for Video Visit: [employer]       Gregory Aguilera complains of   Chief Complaint   Patient presents with     Consult For     discomfort in the prostate x 1 month and in the last 2 weeks it has gotten worse         Assessment/Plan:  Groin/perineal pain x1 month that is worsening. Suspect prostatitis and we discussed this today. Due to the nature of our visit, I am unable to perform a RYLAND today to evaluate his prostate. Discussed options moving forward, including monitoring of symptoms, trialing a course of antibiotics, and scheduling in-person follow up with me to complete exam. Patient opted to start trial of antibiotic and monitor symptoms.   -Start Bactrim BID x 28 days.  -Follow up with me as needed. If symptoms continue despite Bactrim, would recommend an in-person visit for further evaluation, including RYLAND and uroflow/PVR. He may also benefit from an alpha-blocker.     Ashley Tuttle, CNP  Department of Urology      Subjective:   51 year old male with a history of HTN, PATTI, HLD, and epididymitis who presents today via virtual visit for pain in his prostate region. Per chart review, he has previously seen my colleague, Dr. Jennings, in 2018, for testicular discomfort and LUTS. LUTS stable at that time and he opted to observe. Family hx of prostate cancer in his father, diagnosed in his 60s. His last PSA drawn in 09/2020 was low at 0.71.     Today, he states that he has been  experiencing frequent and urgent urination and discomfort in his inner right groin and perineal region. Generally feels like he is able to empty his bladder completely, but sometimes has to go back a second time. Previously had issues with nocturia, but he is now on a CPAP and feels this is no longer an issue. No current fever, hematuria, dysuria, or testicular pain.       Objective:     Exam:   Patient is a 51 year old male   General Appearance: Well groomed, hygenic  Respiratory: No cough, no respiratory distress or labored breathing  Musculoskeletal: Grossly normal with no gross deficits  Skin: No discoloration or apparent rashes  Neurologic: No tremors  Psychiatric: Alert and oriented  Further examination is deferred due to the nature of our visit.

## 2021-06-11 NOTE — LETTER
6/11/2021       RE: Gregory Aguilera  [address]  [place] [address]     Dear Colleague,    Thank you for referring your patient, Gregory Aguilera, to the [facility] [place] at [facility]. Please see a copy of my visit note below.    Hemant is a 51 year old who is being evaluated via a billable video visit.      How would you like to obtain your AVS? MyChart  If the video visit is dropped, the invitation should be resent by: Send to e-mail at: [email or web address]  Will anyone else be joining your video visit? No    Video Start Time: 7:33 AM  Video-Visit Details    Type of service:  Video Visit    Video End Time: 7:51 AM    Originating Location (pt. Location): Home    Distant Location (provider location):  [facility] [place]     Platform used for Video Visit: [employer] [facility]       Gregory Aguilera complains of   Chief Complaint   Patient presents with     Consult For     discomfort in the prostate x 1 month and in the last 2 weeks it has gotten worse         Assessment/Plan:  Groin/perineal pain x1 month that is worsening. Suspect prostatitis and we discussed this today. Due to the nature of our visit, I am unable to perform a RYLAND today to evaluate his prostate. Discussed options moving forward, including monitoring of symptoms, trialing a course of antibiotics, and scheduling in-person follow up with me to complete exam. Patient opted to start trial of antibiotic and monitor symptoms.   -Start Bactrim BID x 28 days.  -Follow up with me as needed. If symptoms continue despite Bactrim, would recommend an in-person visit for further evaluation, including RYLAND and uroflow/PVR. He may also benefit from an alpha-blocker.     Ashley Tuttle, CNP  Department of Urology      Subjective:   51 year old male with a history of HTN, PATTI, HLD, and epididymitis who presents today via virtual visit for pain in his prostate region.  Per chart review, he has previously seen my colleague, Dr. Jennings, in 2018, for testicular discomfort and LUTS. LUTS stable at that time and he opted to observe. Family hx of prostate cancer in his father, diagnosed in his 60s. His last PSA drawn in 09/2020 was low at 0.71.     Today, he states that he has been experiencing frequent and urgent urination and discomfort in his inner right groin and perineal region. Generally feels like he is able to empty his bladder completely, but sometimes has to go back a second time. Previously had issues with nocturia, but he is now on a CPAP and feels this is no longer an issue. No current fever, hematuria, dysuria, or testicular pain.       Objective:     Exam:   Patient is a 51 year old male   General Appearance: Well groomed, hygenic  Respiratory: No cough, no respiratory distress or labored breathing  Musculoskeletal: Grossly normal with no gross deficits  Skin: No discoloration or apparent rashes  Neurologic: No tremors  Psychiatric: Alert and oriented  Further examination is deferred due to the nature of our visit.

## 2021-06-11 NOTE — PATIENT INSTRUCTIONS
UROLOGY CLINIC VISIT PATIENT INSTRUCTIONS    -I have send a 28-day course of the Bactrim antibiotic to your pharmacy.  -Follow up with me as needed if symptoms continue despite this.     If you have any issues, questions or concerns in the meantime, do not hesitate to contact us at [phone] or via [employer].     Ashley Tuttle, CNP

## 2021-06-23 ENCOUNTER — VIRTUAL VISIT (OUTPATIENT)
Dept: GASTROENTEROLOGY | Facility: CLINIC | Age: 51
End: 2021-06-23
Payer: COMMERCIAL

## 2021-06-23 DIAGNOSIS — K21.9 GASTROESOPHAGEAL REFLUX DISEASE WITHOUT ESOPHAGITIS: Primary | ICD-10-CM

## 2021-06-23 PROCEDURE — 99213 OFFICE O/P EST LOW 20 MIN: CPT | Mod: TEL | Performed by: INTERNAL MEDICINE

## 2021-06-23 NOTE — PROGRESS NOTES
Hemant is a 51 year old who is being evaluated via a billable telephone visit.      What phone number would you like to be contacted at? [phone]   How would you like to obtain your AVS? MyChart  Phone call duration:  minutes  Rupert Tafoya CMA

## 2021-06-23 NOTE — PATIENT INSTRUCTIONS
You can continue your baclofen on an as needed basis.    I have switched your omeprazole (prilosec) back to nexium (esomeprazole) - take this once daily on an empty stomach and eat 30-60 minutes later. You can continue to use famotidine up to twice daily as needed (generally works well taken right before bedtime) and things like tums, mylanta, gaviscon, etc.

## 2021-06-23 NOTE — PROGRESS NOTES
HPI:    Hemant presents today for a telephone visit for follow-up of esophageal discomfort.  Symptoms are ebbing and flowing - will have periods of times where he does well but then have periods of 3-5 days where symptoms are present and annoying.  Has been using the baclofen as needed. Sometimes he feels like it helps.  Other times he feels like it doesn't and sometimes he thinks it makes him anxious.  Has some improvement with OTC antacids and pepcid which he thinks may help.    Remains on daily omeprazole - tried going to every other day but it seemed to make things worse. Is wondering about switching back to esomeprazole as he thinks that may have helped more.    Is wondering if weight loss will help - notices a discomfort in his abdomen right around the area his belly starts.  Is more active now that it is nice outside.    Past Medical History:   Diagnosis Date     Adhesive capsulitis of left shoulder 7/1/2020     Allergic rhinitis, cause unspecified     Allergic rhinitis     Essential hypertension, benign      History of blood transfusion      Other chronic allergic conjunctivitis     Allergic conjunctivitis     Tubular adenoma x3 09/20/2019    Surveillance colonoscopy recommended 9/2022       Past Surgical History:   Procedure Laterality Date     CLOSED REDUCTION, PERCUTANEOUS PINNING FINGER, COMBINED  5/25/2012    Procedure:COMBINED CLOSED REDUCTION, PERCUTANEOUS PINNING FINGER; Closed reductionand fixation by pinning of Right Thumb fracture       COLONOSCOPY N/A 8/25/2014    Procedure: COMBINED COLONOSCOPY, SINGLE BIOPSY/POLYPECTOMY BY BIOPSY;  Surgeon: Misael Jarvis MD;  Location:  GI     COLONOSCOPY N/A 9/20/2019    Procedure: COLONOSCOPY, WITH POLYPECTOMY AND BIOPSY;  Surgeon: Fred Edge MD;  Location:  OR     DENTAL SURGERY       ESOPHAGOSCOPY, GASTROSCOPY, DUODENOSCOPY (EGD), COMBINED  6/17/2013    Procedure: COMBINED ESOPHAGOSCOPY, GASTROSCOPY, DUODENOSCOPY (EGD), BIOPSY  SINGLE OR MULTIPLE;;  Surgeon: Benjy Marinelli MD;  Location:  GI     ESOPHAGOSCOPY, GASTROSCOPY, DUODENOSCOPY (EGD), COMBINED N/A 8/25/2014    Procedure: COMBINED ESOPHAGOSCOPY, GASTROSCOPY, DUODENOSCOPY (EGD), BIOPSY SINGLE OR MULTIPLE;  Surgeon: Misael Jarvis MD;  Location:  GI     LASIK BILATERAL       thumb surgery       wisdom teeth removed  8-2014       Family History   Problem Relation Age of Onset     Breast Cancer Mother      Arthritis Father      Genitourinary Problems Father         stones     Cancer Father         skin     C.A.D. Father         stents/age late 60s     Coronary Artery Disease Father         Stents     Prostate Cancer Father         late 50s     Snoring Father      Asthma Sister      Depression Sister      Cancer Paternal Grandfather         ? type     Eye Disorder Paternal Grandfather         cateracts       Social History     Tobacco Use     Smoking status: Never Smoker     Smokeless tobacco: Never Used   Substance Use Topics     Alcohol use: Yes     Comment: couple drinks a week        Assessment and Plan:    # reflux symptoms, atypical chest pain - extensive evaluation including EGDs with biopsies and bravo unrevealing.  Is doing a little better.  Did try TCA but patient didn't tolerate it due to difficulty sleeping.  Some benefit with baclofen.  Will continue once daily PPI - can switch to esomeprazole as he reports more improvement with that in the past.  Continue as needed H2 blockers and OTC antacids as well.  Did discuss weight loss with patient as this may help with symptom control.     RTC 12 months or sooner if needed.    Alexa Cordero DO     Phone call duration: 13 minutes

## 2021-07-20 ENCOUNTER — DOCUMENTATION ONLY (OUTPATIENT)
Dept: SLEEP MEDICINE | Facility: CLINIC | Age: 51
End: 2021-07-20
Payer: COMMERCIAL

## 2021-07-20 NOTE — PROGRESS NOTES
6 month [facility] Recheck Visit     Diagnostic AHI: 25.9 PSG     Subjective measures:   Patient feels rested when he uses his machine sometimes he doesn't bring his machine to cabin. Overall he states everything going good with CPAP .     Assessment: Pt meeting objective benchmarks.  Patient meeting subjective benchmarks.   Action plan:   pt to follow up per provider request       Device type: Auto-CPAP  PAP settings: CPAP min 5.0 cm  H20     CPAP max 15.0 cm  H20    95th% pressure 10.7 cm  H20   Objective measures: 14 day rolling measures      Compliance  64 %      Leak  18.43 lpm  last  upload      AHI 1.69   last  upload      Average number of minutes 276      Objective measure goal  Compliance   Goal >70%  Leak   Goal < 24 lpm  AHI  Goal < 5  Usage  Goal >240    Total time spent on accessing, reviewing and interpreting remote patient PAP therapy data:   10 minutes      Total time spent with direct patient communication :   4 minutes

## 2021-07-29 DIAGNOSIS — R10.2 PERINEAL PAIN IN MALE: Primary | ICD-10-CM

## 2021-07-30 ENCOUNTER — HOSPITAL ENCOUNTER (EMERGENCY)
Facility: CLINIC | Age: 51
Discharge: LEFT BEFORE DISCHARGE | End: 2021-07-30
Payer: COMMERCIAL

## 2021-08-24 ENCOUNTER — LAB (OUTPATIENT)
Dept: LAB | Facility: CLINIC | Age: 51
End: 2021-08-24
Payer: COMMERCIAL

## 2021-08-24 DIAGNOSIS — R10.2 PERINEAL PAIN IN MALE: ICD-10-CM

## 2021-08-24 LAB
ALBUMIN UR-MCNC: NEGATIVE MG/DL
APPEARANCE UR: CLEAR
BACTERIA #/AREA URNS HPF: NORMAL /HPF
BILIRUB UR QL STRIP: NEGATIVE
COLOR UR AUTO: YELLOW
GLUCOSE UR STRIP-MCNC: NEGATIVE MG/DL
HGB UR QL STRIP: NEGATIVE
KETONES UR STRIP-MCNC: NEGATIVE MG/DL
LEUKOCYTE ESTERASE UR QL STRIP: NEGATIVE
NITRATE UR QL: NEGATIVE
PH UR STRIP: 6.5 [PH] (ref 5–7)
RBC #/AREA URNS AUTO: NORMAL /HPF
SP GR UR STRIP: 1.01 (ref 1–1.03)
SQUAMOUS #/AREA URNS AUTO: NORMAL /LPF
UROBILINOGEN UR STRIP-ACNC: 0.2 E.U./DL
WBC #/AREA URNS AUTO: NORMAL /HPF

## 2021-08-24 PROCEDURE — 81001 URINALYSIS AUTO W/SCOPE: CPT

## 2021-08-24 PROCEDURE — 87086 URINE CULTURE/COLONY COUNT: CPT

## 2021-08-25 LAB — BACTERIA UR CULT: NO GROWTH

## 2021-08-30 DIAGNOSIS — R07.9 CHEST PAIN, UNSPECIFIED TYPE: ICD-10-CM

## 2021-09-01 RX ORDER — ATORVASTATIN CALCIUM 20 MG/1
20 TABLET, FILM COATED ORAL DAILY
Qty: 90 TABLET | Refills: 3 | Status: SHIPPED | OUTPATIENT
Start: 2021-09-01 | End: 2022-08-25

## 2021-09-01 NOTE — TELEPHONE ENCOUNTER
atorvastatin (LIPITOR) 20 MG tablet      Last Written Prescription Date:  9-16-20  Last Fill Quantity: 90,   # refills: 3  Last Office Visit : 9-16-20  Future Office visit:  None    Last clinic note: RTC pending coronary CT  (Plan not commented in image result tab)  [place] notes 11-2-20 ( no RTC plan found)    Routing refill request to provider for review/approval because:  ? RTC plan  Last Rx initial fill-

## 2021-09-10 ENCOUNTER — PRE VISIT (OUTPATIENT)
Dept: UROLOGY | Facility: CLINIC | Age: 51
End: 2021-09-10

## 2021-09-10 NOTE — TELEPHONE ENCOUNTER
Reason for visit: follow up prostatitis      Dx/Hx/Sx: groin/perineal pain    Records/imaging/labs/orders: negative UA/UC in epic     At Rooming: Flow/PVR, undress for RYLAND

## 2021-09-19 ENCOUNTER — HEALTH MAINTENANCE LETTER (OUTPATIENT)
Age: 51
End: 2021-09-19

## 2021-09-20 NOTE — PROGRESS NOTES
Chief Complaint:   Prostatitis         History of Present Illness:    Gregory Aguilera is a 51 year old male with a history of HTN, PATTI, HLD, and epididymitis who presents for follow up regarding presumed prostatitis. I saw him initially via virtual consult on 6/11, at which time he endorsed frequent and urgent urination, discomfort in his inner right groin and perineal region x1 one month, which were worsening. Generally felt that he was able to empty his bladder completely, but sometimes had to go back a second time. Nocturia issues resolved with CPAP. Prostatitis suspected and Bactrim BID x 28 days prescribed. He was advised to follow up as needed if symptoms continued despite antibiotics. Symptoms did continue, and a UA/UC was obtained a few weeks ago and was completely negative. Presents today for completion of exam.     Today, he states that his discomfort is very mild, only about a 0.5/10. He also notes urinary frequency, about 1-2 times per hour during the day. He has always gone somewhat frequently, and has always felt that he has a smaller bladder. However, the last few years it has become more urgent.     He sleeps well through the night now that he has a CPAP.    Of note, he has a history of prostate cancer in his father, who was diagnosed in his ~60s. He does not know what type of treatment he had for this, but his father is now in his 80s.           Past Medical History:     Past Medical History:   Diagnosis Date     Adhesive capsulitis of left shoulder 7/1/2020     Allergic rhinitis, cause unspecified     Allergic rhinitis     Essential hypertension, benign      History of blood transfusion      Other chronic allergic conjunctivitis     Allergic conjunctivitis     Tubular adenoma x3 09/20/2019    Surveillance colonoscopy recommended 9/2022            Past Surgical History:     Past Surgical History:   Procedure Laterality Date     CLOSED REDUCTION, PERCUTANEOUS PINNING FINGER, COMBINED   5/25/2012    Procedure:COMBINED CLOSED REDUCTION, PERCUTANEOUS PINNING FINGER; Closed reductionand fixation by pinning of Right Thumb fracture       COLONOSCOPY N/A 8/25/2014    Procedure: COMBINED COLONOSCOPY, SINGLE BIOPSY/POLYPECTOMY BY BIOPSY;  Surgeon: Misael Jarvis MD;  Location:  GI     COLONOSCOPY N/A 9/20/2019    Procedure: COLONOSCOPY, WITH POLYPECTOMY AND BIOPSY;  Surgeon: Fred Edge MD;  [facility]     DENTAL SURGERY       ESOPHAGOSCOPY, GASTROSCOPY, DUODENOSCOPY (EGD), COMBINED  6/17/2013    Procedure: COMBINED ESOPHAGOSCOPY, GASTROSCOPY, DUODENOSCOPY (EGD), BIOPSY SINGLE OR MULTIPLE;;  Surgeon: Benjy Marinelli MD;  Location:  GI     ESOPHAGOSCOPY, GASTROSCOPY, DUODENOSCOPY (EGD), COMBINED N/A 8/25/2014    Procedure: COMBINED ESOPHAGOSCOPY, GASTROSCOPY, DUODENOSCOPY (EGD), BIOPSY SINGLE OR MULTIPLE;  Surgeon: Misael Jarvis MD;  Location:  GI     LASIK BILATERAL       thumb surgery       wisdom teeth removed  8-2014            Medications     Current Outpatient Medications   Medication     acetaminophen (TYLENOL) 325 MG tablet     Alum Hydroxide-Mag Carbonate (GAVISCON PO)     aspirin (ASA) 81 MG EC tablet     atorvastatin (LIPITOR) 20 MG tablet     baclofen (LIORESAL) 10 MG tablet     clindamycin (CLEOCIN T) 1 % external lotion     esomeprazole (NEXIUM) 20 MG DR capsule     fluticasone (FLOVENT DISKUS) 50 MCG/BLIST AEPB     ibuprofen (ADVIL/MOTRIN) 200 MG tablet     lisinopril (ZESTRIL) 20 MG tablet     loratadine (CLARITIN REDITABS) 10 MG dispersible tablet     Probiotic Product (PROBIOTIC DAILY PO)     Wheat Dextrin (BENEFIBER DRINK MIX PO)     omeprazole (PRILOSEC) 20 MG DR capsule     No current facility-administered medications for this visit.            Family History:     Family History   Problem Relation Age of Onset     Breast Cancer Mother      Arthritis Father      Genitourinary Problems Father         stones     Cancer Father         skin      DEEDEE Father         stents/age late 60s     Coronary Artery Disease Father         Stents     Prostate Cancer Father         late 50s     Snoring Father      Asthma Sister      Depression Sister      Cancer Paternal Grandfather         ? type     Eye Disorder Paternal Grandfather         cateracts            Social History:     Social History     Socioeconomic History     Marital status:      Spouse name: Not on file     Number of children: 2     Years of education: Not on file     Highest education level: Not on file   Occupational History     Occupation:    Tobacco Use     Smoking status: Never Smoker     Smokeless tobacco: Never Used   Substance and Sexual Activity     Alcohol use: Yes     Comment: couple drinks a week     Drug use: No     Sexual activity: Yes     Partners: Female     Birth control/protection: Condom   Other Topics Concern     Parent/sibling w/ CABG, MI or angioplasty before 65F 55M? No   Social History Narrative    Dairy/d 2-3 servings/d     Caffeine 1-2 servings/d    Exercise 2 x week-gym regularly treadmill/hockey    Sunscreen used - No    Seatbelts used - Yes    Working smoke/CO detectors in the home - Yes    Guns stored in the home - No    Self Breast Exams - NA    Self Testicular Exam - yes    Eye Exam up to date - Yes   2008    Dental Exam up to date - Yes  2008    Pap Smear up to date - NA    Mammogram up to date - NA    PSA up to date - No    Dexa Scan up to date - NA    Flex Sig / Colonoscopy up to date - No    Immunizations up to date - 2006 td    Abuse: Current or Past(Physical, Sexual or Emotional)- No    Do you feel safe in your environment - Yes    Updated 5/2009     Social Determinants of Health     Financial Resource Strain:      Difficulty of Paying Living Expenses:    Food Insecurity:      Worried About Running Out of Food in the Last Year:      Ran Out of Food in the Last Year:    Transportation Needs:      Lack of Transportation (Medical):      Lack of  "Transportation (Non-Medical):    Physical Activity:      Days of Exercise per Week:      Minutes of Exercise per Session:    Stress:      Feeling of Stress :    Social Connections:      Frequency of Communication with Friends and Family:      Frequency of Social Gatherings with Friends and Family:      Attends [employer] Services:      Active Member of Clubs or Organizations:      Attends Club or Organization Meetings:      Marital Status:    Intimate Partner Violence:      Fear of Current or Ex-Partner:      Emotionally Abused:      Physically Abused:      Sexually Abused:             Allergies:   Dust mites, Grass, Mold, No known drug allergies, and Trees         Review of Systems:  From intake questionnaire   Negative 14 system review except as noted on HPI, nurse's note.         Physical Exam:   Patient is a 51 year old  male   Vitals: Blood pressure (!) 126/91, pulse 65, height 1.715 m (5' 7.5\"), weight 93.4 kg (205 lb 12.8 oz).  General Appearance Adult: Alert, no acute distress, oriented  Lungs: no respiratory distress, or pursed lip breathing  Heart: No obvious jugular venous distension present  Abdomen: soft, nontender, no organomegaly or masses, Body mass index is 31.76 kg/m .  : RYLAND anodular, symmetric     Uroflow and Post-Void Residual by Bladder Scan     PVR: 18 mL       Labs and Pathology:    I personally reviewed all applicable laboratory data and went over findings with patient  Significant for:    CBC RESULTS:  Recent Labs   Lab Test 02/01/20  1251 10/30/18  2135 07/07/16  1530   WBC 4.9 5.8 6.1   HGB 16.4 16.1 14.9   * 216 163        BMP RESULTS:  Recent Labs   Lab Test 09/18/20  1508 02/01/20  1251 02/01/19  1425 10/30/18  2135    139 138 139   POTASSIUM 3.8 3.9 3.8 4.0   CHLORIDE 107 104 105 102   CO2 23 29 26 28   ANIONGAP 10 6 7 9   GLC 91 108* 99 111*   BUN 15 16 16 14   CR 0.88 1.18 1.06 0.94   GFRESTIMATED >90 71 81 85   GFRESTBLACK >90 82 >90 >90   PAPA 9.0 8.7 9.2 9.0       UA " RESULTS:   Recent Labs   Lab Test 08/24/21  1102 02/01/20  1413 02/01/19  1431   SG 1.010 1.017 1.025   URINEPH 6.5 5.5 5.5   NITRITE Negative Negative Negative   RBCU None Seen 1  --    WBCU 0-5 4  --        PSA RESULTS  PSA   Date Value Ref Range Status   09/18/2020 0.71 0 - 4 ug/L Final     Comment:     Assay Method:  Chemiluminescence using Siemens Vista analyzer   02/01/2019 0.78 0 - 4 ug/L Final     Comment:     Assay Method:  Chemiluminescence using Siemens Vista analyzer   05/13/2009 0.67 0 - 4 ug/L Final            Assessment and Plan:     Assessment: 51 year old male with a history of HTN, PATTI, HLD, and epididymitis who has noticed an improvement in his perineal discomfort following a course of Bactrim; still remains at a very minor level. Also with urinary frequency, which has been a longstanding issue. PVR low today at 18 mL. RYLAND largely unremarkable. Family history of prostate cancer in his father, so recommend we update his PSA today, and he agrees with this plan. Regarding his perineal symptoms, I suspect this may be residual inflammation vs pelvic floor dysfunction, and we discussed these differentials. He is fairly sedentary during the day, so recommend that he increase his physical activity. Discussed the potential role of physical therapy down the road. Regarding his urinary frequency, he is interested in trying something as needed to help his symptoms.     Plan:  -Use oxybutynin 5 mg as needed up to three times daily for urinary frequency. Discussed potential side effects.  -Will update PSA today  -Work on increasing physical activity  -Follow up with me in 6 months for a virtual visit to check in on symptoms, or sooner if needed.       Ashley Tuttle, CNP  Department of Urology

## 2021-09-21 ENCOUNTER — OFFICE VISIT (OUTPATIENT)
Dept: UROLOGY | Facility: CLINIC | Age: 51
End: 2021-09-21
Payer: COMMERCIAL

## 2021-09-21 VITALS
BODY MASS INDEX: 31.19 KG/M2 | HEART RATE: 65 BPM | HEIGHT: 68 IN | DIASTOLIC BLOOD PRESSURE: 91 MMHG | SYSTOLIC BLOOD PRESSURE: 126 MMHG | WEIGHT: 205.8 LBS

## 2021-09-21 DIAGNOSIS — R10.2 PERINEAL PAIN IN MALE: ICD-10-CM

## 2021-09-21 DIAGNOSIS — R35.0 URINARY FREQUENCY: Primary | ICD-10-CM

## 2021-09-21 PROCEDURE — 99213 OFFICE O/P EST LOW 20 MIN: CPT | Mod: 25 | Performed by: NURSE PRACTITIONER

## 2021-09-21 PROCEDURE — 51798 US URINE CAPACITY MEASURE: CPT | Performed by: NURSE PRACTITIONER

## 2021-09-21 RX ORDER — OXYBUTYNIN CHLORIDE 5 MG/1
5 TABLET ORAL 3 TIMES DAILY
Qty: 30 TABLET | Refills: 3 | Status: SHIPPED | OUTPATIENT
Start: 2021-09-21

## 2021-09-21 ASSESSMENT — MIFFLIN-ST. JEOR: SCORE: 1745.06

## 2021-09-21 ASSESSMENT — PAIN SCALES - GENERAL: PAINLEVEL: NO PAIN (0)

## 2021-09-21 NOTE — LETTER
9/21/2021       RE: Gregory Aguilera  [address]  [place] [id number]     Dear Colleague,    Thank you for referring your patient, Gregory Aguilera, to the [facility] [place] at [facility]. Please see a copy of my visit note below.            Chief Complaint:   Prostatitis         History of Present Illness:    Gregory Aguilera is a 51 year old male with a history of HTN, PATTI, HLD, and epididymitis who presents for follow up regarding presumed prostatitis. I saw him initially via virtual consult on 6/11, at which time he endorsed frequent and urgent urination, discomfort in his inner right groin and perineal region x1 one month, which were worsening. Generally felt that he was able to empty his bladder completely, but sometimes had to go back a second time. Nocturia issues resolved with CPAP. Prostatitis suspected and Bactrim BID x 28 days prescribed. He was advised to follow up as needed if symptoms continued despite antibiotics. Symptoms did continue, and a UA/UC was obtained a few weeks ago and was completely negative. Presents today for completion of exam.     Today, he states that his discomfort is very mild, only about a 0.5/10. He also notes urinary frequency, about 1-2 times per hour during the day. He has always gone somewhat frequently, and has always felt that he has a smaller bladder. However, the last few years it has become more urgent.     He sleeps well through the night now that he has a CPAP.    Of note, he has a history of prostate cancer in his father, who was diagnosed in his ~60s. He does not know what type of treatment he had for this, but his father is now in his 80s.           Past Medical History:     Past Medical History:   Diagnosis Date     Adhesive capsulitis of left shoulder 7/1/2020     Allergic rhinitis, cause unspecified     Allergic rhinitis     Essential hypertension, benign      History of  blood transfusion      Other chronic allergic conjunctivitis     Allergic conjunctivitis     Tubular adenoma x3 09/20/2019    Surveillance colonoscopy recommended 9/2022            Past Surgical History:     Past Surgical History:   Procedure Laterality Date     CLOSED REDUCTION, PERCUTANEOUS PINNING FINGER, COMBINED  5/25/2012    Procedure:COMBINED CLOSED REDUCTION, PERCUTANEOUS PINNING FINGER; Closed reductionand fixation by pinning of Right Thumb fracture       COLONOSCOPY N/A 8/25/2014    Procedure: COMBINED COLONOSCOPY, SINGLE BIOPSY/POLYPECTOMY BY BIOPSY;  Surgeon: Misael Jarvis MD;  [facility]     COLONOSCOPY N/A 9/20/2019    Procedure: COLONOSCOPY, WITH POLYPECTOMY AND BIOPSY;  Surgeon: Fred Edge MD;  [facility]     DENTAL SURGERY       ESOPHAGOSCOPY, GASTROSCOPY, DUODENOSCOPY (EGD), COMBINED  6/17/2013    Procedure: COMBINED ESOPHAGOSCOPY, GASTROSCOPY, DUODENOSCOPY (EGD), BIOPSY SINGLE OR MULTIPLE;;  Surgeon: Benjy Marinelli MD;  Location: [facility]     ESOPHAGOSCOPY, GASTROSCOPY, DUODENOSCOPY (EGD), COMBINED N/A 8/25/2014    Procedure: COMBINED ESOPHAGOSCOPY, GASTROSCOPY, DUODENOSCOPY (EGD), BIOPSY SINGLE OR MULTIPLE;  Surgeon: Misael Jarvis MD;  Location: [facility]     LASIK BILATERAL       thumb surgery       wisdom teeth removed  8-2014            Medications     Current Outpatient Medications   Medication     acetaminophen (TYLENOL) 325 MG tablet     Alum Hydroxide-Mag Carbonate (GAVISCON PO)     aspirin (ASA) 81 MG EC tablet     atorvastatin (LIPITOR) 20 MG tablet     baclofen (LIORESAL) 10 MG tablet     clindamycin (CLEOCIN T) 1 % external lotion     esomeprazole (NEXIUM) 20 MG DR capsule     fluticasone (FLOVENT DISKUS) 50 MCG/BLIST AEPB     ibuprofen (ADVIL/MOTRIN) 200 MG tablet     lisinopril (ZESTRIL) 20 MG tablet     loratadine (CLARITIN REDITABS) 10 MG dispersible tablet     Probiotic Product (PROBIOTIC DAILY PO)     Wheat Dextrin (BENEFIBER DRINK MIX PO)      omeprazole (PRILOSEC) 20 MG DR capsule     No current facility-administered medications for this visit.            Family History:     Family History   Problem Relation Age of Onset     Breast Cancer Mother      Arthritis Father      Genitourinary Problems Father         stones     Cancer Father         skin     C.A.D. Father         stents/age late 60s     Coronary Artery Disease Father         Stents     Prostate Cancer Father         late 50s     Snoring Father      Asthma Sister      Depression Sister      Cancer Paternal Grandfather         ? type     Eye Disorder Paternal Grandfather         cateracts            Social History:     Social History     Socioeconomic History     Marital status:      Spouse name: Not on file     Number of children: 2     Years of education: Not on file     Highest education level: Not on file   Occupational History     Occupation:    Tobacco Use     Smoking status: Never Smoker     Smokeless tobacco: Never Used   Substance and Sexual Activity     Alcohol use: Yes     Comment: couple drinks a week     Drug use: No     Sexual activity: Yes     Partners: Female     Birth control/protection: Condom   Other Topics Concern     Parent/sibling w/ CABG, MI or angioplasty before 65F 55M? No   Social History Narrative    Dairy/d 2-3 servings/d     Caffeine 1-2 servings/d    Exercise 2 x week-gym regularly treadmill/hockey    Sunscreen used - No    Seatbelts used - Yes    Working smoke/CO detectors in the home - Yes    Guns stored in the home - No    Self Breast Exams - NA    Self Testicular Exam - yes    Eye Exam up to date - Yes   2008    Dental Exam up to date - Yes  2008    Pap Smear up to date - NA    Mammogram up to date - NA    PSA up to date - No    Dexa Scan up to date - NA    Flex Sig / Colonoscopy up to date - No    Immunizations up to date - 2006 td    Abuse: Current or Past(Physical, Sexual or Emotional)- No    Do you feel safe in your environment - Yes    Updated  "5/2009     Social Determinants of Health     Financial Resource Strain:      Difficulty of Paying Living Expenses:    Food Insecurity:      Worried About Running Out of Food in the Last Year:      Ran Out of Food in the Last Year:    Transportation Needs:      Lack of Transportation (Medical):      Lack of Transportation (Non-Medical):    Physical Activity:      Days of Exercise per Week:      Minutes of Exercise per Session:    Stress:      Feeling of Stress :    Social Connections:      Frequency of Communication with Friends and Family:      Frequency of Social Gatherings with Friends and Family:      Attends [employer] Services:      Active Member of Clubs or Organizations:      Attends Club or Organization Meetings:      Marital Status:    Intimate Partner Violence:      Fear of Current or Ex-Partner:      Emotionally Abused:      Physically Abused:      Sexually Abused:             Allergies:   Dust mites, Grass, Mold, No known drug allergies, and Trees         Review of Systems:  From intake questionnaire   Negative 14 system review except as noted on HPI, nurse's note.         Physical Exam:   Patient is a 51 year old  male   Vitals: Blood pressure (!) 126/91, pulse 65, height 1.715 m (5' 7.5\"), weight 93.4 kg (205 lb 12.8 oz).  General Appearance Adult: Alert, no acute distress, oriented  Lungs: no respiratory distress, or pursed lip breathing  Heart: No obvious jugular venous distension present  Abdomen: soft, nontender, no organomegaly or masses, Body mass index is 31.76 kg/m .  : RYLAND anodular, symmetric     Uroflow and Post-Void Residual by Bladder Scan     PVR: 18 mL       Labs and Pathology:    I personally reviewed all applicable laboratory data and went over findings with patient  Significant for:    CBC RESULTS:  Recent Labs   Lab Test 02/01/20  1251 10/30/18  2135 07/07/16  1530   WBC 4.9 5.8 6.1   HGB 16.4 16.1 14.9   * 216 163        BMP RESULTS:  Recent Labs   Lab Test 09/18/20  1508 " 02/01/20  1251 02/01/19  1425 10/30/18  2135    139 138 139   POTASSIUM 3.8 3.9 3.8 4.0   CHLORIDE 107 104 105 102   CO2 23 29 26 28   ANIONGAP 10 6 7 9   GLC 91 108* 99 111*   BUN 15 16 16 14   CR 0.88 1.18 1.06 0.94   GFRESTIMATED >90 71 81 85   GFRESTBLACK >90 82 >90 >90   PAPA 9.0 8.7 9.2 9.0       UA RESULTS:   Recent Labs   Lab Test 08/24/21  1102 02/01/20  1413 02/01/19  1431   SG 1.010 1.017 1.025   URINEPH 6.5 5.5 5.5   NITRITE Negative Negative Negative   RBCU None Seen 1  --    WBCU 0-5 4  --        PSA RESULTS  PSA   Date Value Ref Range Status   09/18/2020 0.71 0 - 4 ug/L Final     Comment:     Assay Method:  Chemiluminescence using Siemens Vista analyzer   02/01/2019 0.78 0 - 4 ug/L Final     Comment:     Assay Method:  Chemiluminescence using Siemens Vista analyzer   05/13/2009 0.67 0 - 4 ug/L Final            Assessment and Plan:     Assessment: 51 year old male with a history of HTN, PATTI, HLD, and epididymitis who has noticed an improvement in his perineal discomfort following a course of Bactrim; still remains at a very minor level. Also with urinary frequency, which has been a longstanding issue. PVR low today at 18 mL. RYLAND largely unremarkable. Family history of prostate cancer in his father, so recommend we update his PSA today, and he agrees with this plan. Regarding his perineal symptoms, I suspect this may be residual inflammation vs pelvic floor dysfunction, and we discussed these differentials. He is fairly sedentary during the day, so recommend that he increase his physical activity. Discussed the potential role of physical therapy down the road. Regarding his urinary frequency, he is interested in trying something as needed to help his symptoms.     Plan:  -Use oxybutynin 5 mg as needed up to three times daily for urinary frequency. Discussed potential side effects.  -Will update PSA today  -Work on increasing physical activity  -Follow up with me in 6 months for a virtual visit to  check in on symptoms, or sooner if needed.       Ashley Tuttle, CNP  Department of Urology

## 2021-09-21 NOTE — PATIENT INSTRUCTIONS
UROLOGY CLINIC VISIT PATIENT INSTRUCTIONS    -Use the oxybutynin as needed for frequency, up to three times daily.  -We will check the PSA (hopefully today).  -Work on moving around more.  -Follow up with me in 6 months for a virtual visit to check in.     If you have any issues, questions or concerns in the meantime, do not hesitate to contact us at [phone] or via [employer].     It was a pleasure meeting with you today.  Thank you for allowing me and my team the privilege of caring for you today.  YOU are the reason we are here, and I truly hope we provided you with the excellent service you deserve.  Please let us know if there is anything else we can do for you so that we can be sure you are leaving completely satisfied with your care experience.    Ashley Tuttle, CNP

## 2021-10-08 DIAGNOSIS — K21.9 GASTROESOPHAGEAL REFLUX DISEASE WITHOUT ESOPHAGITIS: ICD-10-CM

## 2021-11-08 ENCOUNTER — LAB (OUTPATIENT)
Dept: LAB | Facility: CLINIC | Age: 51
End: 2021-11-08
Payer: COMMERCIAL

## 2021-11-08 DIAGNOSIS — R35.0 URINARY FREQUENCY: ICD-10-CM

## 2021-11-08 LAB — PSA SERPL-MCNC: 0.82 UG/L (ref 0–3.5)

## 2021-11-08 PROCEDURE — 84153 ASSAY OF PSA TOTAL: CPT

## 2021-11-08 PROCEDURE — 36415 COLL VENOUS BLD VENIPUNCTURE: CPT

## 2021-11-09 ENCOUNTER — ANCILLARY PROCEDURE (OUTPATIENT)
Dept: ULTRASOUND IMAGING | Facility: CLINIC | Age: 51
End: 2021-11-09
Attending: NURSE PRACTITIONER
Payer: COMMERCIAL

## 2021-11-09 DIAGNOSIS — N50.819 TESTICULAR PAIN: ICD-10-CM

## 2021-11-09 LAB — RADIOLOGIST FLAGS: ABNORMAL

## 2021-11-09 PROCEDURE — 76870 US EXAM SCROTUM: CPT | Mod: GC | Performed by: RADIOLOGY

## 2021-11-10 ENCOUNTER — TELEPHONE (OUTPATIENT)
Dept: UROLOGY | Facility: CLINIC | Age: 51
End: 2021-11-10
Payer: COMMERCIAL

## 2021-11-10 DIAGNOSIS — N50.811 PAIN IN RIGHT TESTICLE: Primary | ICD-10-CM

## 2021-11-10 NOTE — TELEPHONE ENCOUNTER
[facility] Call Center    Phone Message    May a detailed message be left on voicemail: yes     Reason for Call: Other: Klaus called in to report urgent findings from 11/9/21 [place]. Please c/b [phone]     Action Taken: Message routed to:  [facility] uro    Travel Screening: Not Applicable

## 2021-11-11 RX ORDER — LEVOFLOXACIN 500 MG/1
500 TABLET, FILM COATED ORAL DAILY
Qty: 10 TABLET | Refills: 0 | Status: SHIPPED | OUTPATIENT
Start: 2021-11-11 | End: 2022-05-09

## 2021-11-11 NOTE — TELEPHONE ENCOUNTER
Ashley Tuttle, CNP  [facility]     [place]    Please notify patient of epididymo-orchitis (right scrotal infection) found on his ultrasound. We need to treat with Levaquin 500 mg daily x10 days and have him check back with me via MyChart if symptoms continue after completing this. Thanks!!      Patient contacted and informed of above. RX sent to patient's preferred pharmacy.  Angelina Jaeger LPN  Urology Clinic Service   [facility]

## 2021-11-16 ENCOUNTER — OFFICE VISIT (OUTPATIENT)
Dept: FAMILY MEDICINE | Facility: CLINIC | Age: 51
End: 2021-11-16
Payer: COMMERCIAL

## 2021-11-16 VITALS
BODY MASS INDEX: 30.98 KG/M2 | DIASTOLIC BLOOD PRESSURE: 94 MMHG | TEMPERATURE: 98.4 F | WEIGHT: 204.44 LBS | SYSTOLIC BLOOD PRESSURE: 138 MMHG | HEART RATE: 63 BPM | HEIGHT: 68 IN | OXYGEN SATURATION: 96 %

## 2021-11-16 DIAGNOSIS — M25.551 HIP PAIN, RIGHT: ICD-10-CM

## 2021-11-16 DIAGNOSIS — G47.33 OSA (OBSTRUCTIVE SLEEP APNEA): ICD-10-CM

## 2021-11-16 DIAGNOSIS — Z76.89 ENCOUNTER TO ESTABLISH CARE: Primary | ICD-10-CM

## 2021-11-16 DIAGNOSIS — E78.5 HYPERLIPIDEMIA LDL GOAL <130: ICD-10-CM

## 2021-11-16 DIAGNOSIS — I10 HYPERTENSION GOAL BP (BLOOD PRESSURE) < 140/90: ICD-10-CM

## 2021-11-16 DIAGNOSIS — M62.838 MUSCLE SPASM: ICD-10-CM

## 2021-11-16 PROBLEM — K20.0 EOSINOPHILIC ESOPHAGITIS: Status: ACTIVE | Noted: 2018-04-11

## 2021-11-16 PROCEDURE — 99214 OFFICE O/P EST MOD 30 MIN: CPT | Performed by: FAMILY MEDICINE

## 2021-11-16 RX ORDER — CYCLOBENZAPRINE HCL 5 MG
5 TABLET ORAL
Qty: 60 TABLET | Refills: 1 | Status: SHIPPED | OUTPATIENT
Start: 2021-11-16 | End: 2022-02-25

## 2021-11-16 RX ORDER — FLUTICASONE PROPIONATE 50 MCG
1 SPRAY, SUSPENSION (ML) NASAL DAILY
COMMUNITY

## 2021-11-16 ASSESSMENT — MIFFLIN-ST. JEOR: SCORE: 1738.88

## 2021-11-16 NOTE — PROGRESS NOTES
ASSESSMENT/PLAN:   Gregory was seen today for establish care.    Diagnoses and all orders for this visit:    Encounter to establish care    Muscle spasm  Significant muscle spasms mostly in neck, upper back, mid back. Possibly due to posture related to his occupation. Recommend regularly stretching like yoga that his wife suggests, continue nsaid and add muscle relaxant PRN. If no improvement, consider PT.   -     cyclobenzaprine (FLEXERIL) 5 MG tablet; Take 1 tablet (5 mg) by mouth at bedtime as needed, may repeat once for muscle spasms    Hypertension goal BP (blood pressure) < 140/90  PATTI (obstructive sleep apnea)  Hyperlipidemia LDL goal <130  Discussed risks vs benefits of his lisinopril and atorvastatin. Right now, because his BP is borderline and sometimes high, his ascvd risk score is 5.7%, which recommends that he be on a statin. Lifestyle modifications will bring down that risk calculation. For now, while he gets a chance to work on dietary and lifestyle modifications, recommend that he continue those medications.     Hip pain  Differential includes MSK, possibly arthritis? Improves after movement which leans towards muscle origin over arthritis. See above jillian for muscle spasm. Consider PT if worse    Return in about 3 months (around 2/16/2022) for Routine preventive, sooner if needed.       ======================================================    SUBJECTIVE  Gregory Aguilera is a 51 year old male here for establish care    Medications  Atorvastatin   lisinopril  Enter flonase otc    The 10-year ASCVD risk score (Artie JONES Jr., et al., 2013) is: 5.7%    Values used to calculate the score:      Age: 51 years      Sex: Male      Is Non- : No      Diabetic: No      Tobacco smoker: No      Systolic Blood Pressure: 130 mmHg      Is BP treated: Yes      HDL Cholesterol: 36 mg/dL      Total Cholesterol: 159 mg/dL      headaches  Neck/shoulder pain on right side    Mid back pain  Right  "side underneath shoulder blade.   Low back pain  Right low hip pain/right thigh - like a cramp    PATTI.   Started cpap about a year ago.     Social history  Non-practicing , two kids in their teens.   Born in [place], his dad worked for the government so he has grown up around the world. His father was born in [place] and his mom in [facility].     Family history.  No known family history of HTN, stroke, MI, cancer.       ROS  Complete 10 point review of systems negative except as noted above in HPI      OBJECTIVE  BP (!) 130/90 (BP Location: Left arm, Patient Position: Sitting)   Pulse 63   Temp 98.4  F (36.9  C) (Oral)   Ht 1.715 m (5' 7.5\")   Wt 92.7 kg (204 lb 7 oz)   SpO2 96%   BMI 31.55 kg/m       General: Cooperative, pleasant, in no acute distress  Neck: no lymphadenopathy, no masses  CV: RRR, normal S1/S2, no murmur, rubs, gallops  Resp: No respiratory distress. Clear to auscultation bilaterally. No wheezes, rales, rhonchi  Abd: Nontender, nondistended, bowel sounds present  Ext: radial/pedal pulses +2 bilaterally  MSK: tense/tight traps bilaterally up into neck, tension in right side >left. Full ROM of both shoulders but with some pain when raising right arm over his head. Full ROM in hip flexion and extension.   Neuro: CN II-XII intact  Skin: warm, well perfused. No rashes  Psych: No suicidal or homicidal ideations, no self-harm.  Normal affect.    LABS & IMAGES   No results found for any visits on 11/16/21.      ======================================================    Options for treatment and follow-up care were reviewed with the patient. Gregory Molinar and/or guardian was engaged and actively involved in the decision making process. Gregory Molinar and/or guardian verbalized understanding of the options discussed and was satisfied with the final plan.      Pastora Olivo MD          "

## 2021-11-29 ENCOUNTER — IMMUNIZATION (OUTPATIENT)
Dept: NURSING | Facility: CLINIC | Age: 51
End: 2021-11-29
Payer: COMMERCIAL

## 2021-11-29 ENCOUNTER — IMMUNIZATION (OUTPATIENT)
Dept: FAMILY MEDICINE | Facility: CLINIC | Age: 51
End: 2021-11-29
Payer: COMMERCIAL

## 2021-11-29 DIAGNOSIS — Z23 NEED FOR IMMUNIZATION AGAINST INFLUENZA: Primary | ICD-10-CM

## 2021-11-29 PROCEDURE — 90686 IIV4 VACC NO PRSV 0.5 ML IM: CPT

## 2021-11-29 PROCEDURE — 91300 PR COVID VAC PFIZER DIL RECON 30 MCG/0.3 ML IM: CPT | Performed by: FAMILY MEDICINE

## 2021-11-29 PROCEDURE — 0004A PR COVID VAC PFIZER DIL RECON 30 MCG/0.3 ML IM: CPT | Performed by: FAMILY MEDICINE

## 2021-11-29 PROCEDURE — 90471 IMMUNIZATION ADMIN: CPT

## 2022-02-24 ENCOUNTER — PRE VISIT (OUTPATIENT)
Dept: UROLOGY | Facility: CLINIC | Age: 52
End: 2022-02-24
Payer: COMMERCIAL

## 2022-02-24 DIAGNOSIS — Z76.0 ENCOUNTER FOR MEDICATION REFILL: Primary | ICD-10-CM

## 2022-02-24 DIAGNOSIS — M62.838 MUSCLE SPASM: ICD-10-CM

## 2022-02-24 DIAGNOSIS — K21.9 GASTROESOPHAGEAL REFLUX DISEASE WITHOUT ESOPHAGITIS: ICD-10-CM

## 2022-02-24 NOTE — TELEPHONE ENCOUNTER
Reason for visit: 6 month follow up      Dx/Hx/Sx: prostatitis, urinary frequency    Records/imaging/labs/orders: in epic    At Rooming: video visit

## 2022-02-25 RX ORDER — CYCLOBENZAPRINE HCL 5 MG
5 TABLET ORAL
Qty: 60 TABLET | Refills: 1 | Status: SHIPPED | OUTPATIENT
Start: 2022-02-25 | End: 2023-02-22

## 2022-02-25 NOTE — TELEPHONE ENCOUNTER
baclofen (LIORESAL) 10 MG tablet  Last Written Prescription Date:  2/3/2021  Last Fill Quantity: 30,  # refills: 3   Last office visit: 6/23/2021 with prescribing provider   Future Office Visit:      Routing refill request to provider for review/approval because:  Drug not on the [facility] refill protocol   A break in medication? (though 6/23/2021 OV note states using as needed)    Jen Martinez RN

## 2022-03-02 RX ORDER — BACLOFEN 10 MG/1
5 TABLET ORAL 2 TIMES DAILY
Qty: 90 TABLET | Refills: 1 | Status: SHIPPED | OUTPATIENT
Start: 2022-03-02 | End: 2022-07-06

## 2022-03-29 DIAGNOSIS — K21.9 GASTROESOPHAGEAL REFLUX DISEASE WITHOUT ESOPHAGITIS: ICD-10-CM

## 2022-03-29 NOTE — TELEPHONE ENCOUNTER
No notes from provider, unable to approve prescriptions. Please forward to providers to be addressed. Thank you!

## 2022-04-18 DIAGNOSIS — Z76.0 ENCOUNTER FOR MEDICATION REFILL: Primary | ICD-10-CM

## 2022-04-18 DIAGNOSIS — I10 HYPERTENSION GOAL BP (BLOOD PRESSURE) < 140/90: ICD-10-CM

## 2022-04-18 RX ORDER — LISINOPRIL 20 MG/1
TABLET ORAL
Qty: 90 TABLET | Refills: 3 | Status: SHIPPED | OUTPATIENT
Start: 2022-04-18 | End: 2023-02-22

## 2022-04-30 ENCOUNTER — HEALTH MAINTENANCE LETTER (OUTPATIENT)
Age: 52
End: 2022-04-30

## 2022-05-09 ENCOUNTER — OFFICE VISIT (OUTPATIENT)
Dept: FAMILY MEDICINE | Facility: CLINIC | Age: 52
End: 2022-05-09
Payer: COMMERCIAL

## 2022-05-09 VITALS
WEIGHT: 203.7 LBS | HEIGHT: 68 IN | OXYGEN SATURATION: 99 % | TEMPERATURE: 98.3 F | HEART RATE: 71 BPM | SYSTOLIC BLOOD PRESSURE: 130 MMHG | BODY MASS INDEX: 30.87 KG/M2 | DIASTOLIC BLOOD PRESSURE: 88 MMHG

## 2022-05-09 DIAGNOSIS — Z13.0 SCREENING, ANEMIA, DEFICIENCY, IRON: ICD-10-CM

## 2022-05-09 DIAGNOSIS — G47.33 OSA (OBSTRUCTIVE SLEEP APNEA): Primary | Chronic | ICD-10-CM

## 2022-05-09 DIAGNOSIS — M75.01 ADHESIVE CAPSULITIS OF RIGHT SHOULDER: ICD-10-CM

## 2022-05-09 DIAGNOSIS — Z23 IMMUNIZATION DUE: ICD-10-CM

## 2022-05-09 DIAGNOSIS — E78.5 HYPERLIPIDEMIA LDL GOAL <130: ICD-10-CM

## 2022-05-09 DIAGNOSIS — Z23 HIGH PRIORITY FOR 2019 NOVEL CORONAVIRUS VACCINATION: ICD-10-CM

## 2022-05-09 DIAGNOSIS — R07.9 CHEST PAIN, UNSPECIFIED TYPE: ICD-10-CM

## 2022-05-09 DIAGNOSIS — R21 FACIAL RASH: ICD-10-CM

## 2022-05-09 DIAGNOSIS — Z13.29 SCREENING FOR THYROID DISORDER: ICD-10-CM

## 2022-05-09 DIAGNOSIS — I10 HYPERTENSION GOAL BP (BLOOD PRESSURE) < 140/90: ICD-10-CM

## 2022-05-09 DIAGNOSIS — R07.0 THROAT PAIN: ICD-10-CM

## 2022-05-09 DIAGNOSIS — Z00.00 ROUTINE GENERAL MEDICAL EXAMINATION AT A HEALTH CARE FACILITY: ICD-10-CM

## 2022-05-09 LAB
ALBUMIN SERPL-MCNC: 4.3 G/DL (ref 3.5–5)
ALP SERPL-CCNC: 77 U/L (ref 45–120)
ALT SERPL W P-5'-P-CCNC: 21 U/L (ref 0–45)
ANION GAP SERPL CALCULATED.3IONS-SCNC: 11 MMOL/L (ref 5–18)
AST SERPL W P-5'-P-CCNC: 16 U/L (ref 0–40)
BILIRUB SERPL-MCNC: 0.8 MG/DL (ref 0–1)
BUN SERPL-MCNC: 13 MG/DL (ref 8–22)
C REACTIVE PROTEIN LHE: 0.2 MG/DL (ref 0–0.8)
CALCIUM SERPL-MCNC: 9.3 MG/DL (ref 8.5–10.5)
CHLORIDE BLD-SCNC: 103 MMOL/L (ref 98–107)
CHOLEST SERPL-MCNC: 163 MG/DL
CO2 SERPL-SCNC: 26 MMOL/L (ref 22–31)
CREAT SERPL-MCNC: 0.88 MG/DL (ref 0.7–1.3)
ERYTHROCYTE [DISTWIDTH] IN BLOOD BY AUTOMATED COUNT: 12.2 % (ref 10–15)
ERYTHROCYTE [SEDIMENTATION RATE] IN BLOOD BY WESTERGREN METHOD: 2 MM/HR (ref 0–15)
FASTING STATUS PATIENT QL REPORTED: NO
GFR SERPL CREATININE-BSD FRML MDRD: >90 ML/MIN/1.73M2
GLUCOSE BLD-MCNC: 96 MG/DL (ref 70–125)
HCT VFR BLD AUTO: 45 % (ref 40–53)
HDLC SERPL-MCNC: 43 MG/DL
HGB BLD-MCNC: 15.4 G/DL (ref 13.3–17.7)
LDLC SERPL CALC-MCNC: 77 MG/DL
MCH RBC QN AUTO: 29.4 PG (ref 26.5–33)
MCHC RBC AUTO-ENTMCNC: 34.2 G/DL (ref 31.5–36.5)
MCV RBC AUTO: 86 FL (ref 78–100)
PLATELET # BLD AUTO: 213 10E3/UL (ref 150–450)
POTASSIUM BLD-SCNC: 3.8 MMOL/L (ref 3.5–5)
PROT SERPL-MCNC: 7.1 G/DL (ref 6–8)
RBC # BLD AUTO: 5.23 10E6/UL (ref 4.4–5.9)
SODIUM SERPL-SCNC: 140 MMOL/L (ref 136–145)
TRIGL SERPL-MCNC: 216 MG/DL
TSH SERPL DL<=0.005 MIU/L-ACNC: 1.1 UIU/ML (ref 0.3–5)
WBC # BLD AUTO: 5.7 10E3/UL (ref 4–11)

## 2022-05-09 PROCEDURE — 80053 COMPREHEN METABOLIC PANEL: CPT | Performed by: FAMILY MEDICINE

## 2022-05-09 PROCEDURE — 99396 PREV VISIT EST AGE 40-64: CPT | Mod: 25 | Performed by: FAMILY MEDICINE

## 2022-05-09 PROCEDURE — 80061 LIPID PANEL: CPT | Performed by: FAMILY MEDICINE

## 2022-05-09 PROCEDURE — 36415 COLL VENOUS BLD VENIPUNCTURE: CPT | Performed by: FAMILY MEDICINE

## 2022-05-09 PROCEDURE — 86140 C-REACTIVE PROTEIN: CPT | Performed by: FAMILY MEDICINE

## 2022-05-09 PROCEDURE — 85652 RBC SED RATE AUTOMATED: CPT | Performed by: FAMILY MEDICINE

## 2022-05-09 PROCEDURE — 91305 COVID-19,PF,PFIZER (12+ YRS): CPT | Performed by: FAMILY MEDICINE

## 2022-05-09 PROCEDURE — 0054A COVID-19,PF,PFIZER (12+ YRS): CPT | Performed by: FAMILY MEDICINE

## 2022-05-09 PROCEDURE — 90715 TDAP VACCINE 7 YRS/> IM: CPT | Performed by: FAMILY MEDICINE

## 2022-05-09 PROCEDURE — 90471 IMMUNIZATION ADMIN: CPT | Performed by: FAMILY MEDICINE

## 2022-05-09 PROCEDURE — 84443 ASSAY THYROID STIM HORMONE: CPT | Performed by: FAMILY MEDICINE

## 2022-05-09 PROCEDURE — 85027 COMPLETE CBC AUTOMATED: CPT | Performed by: FAMILY MEDICINE

## 2022-05-09 NOTE — PROGRESS NOTES
SUBJECTIVE:   CC: Gregory Aguilera is an 52 year old male who presents for preventative health visit.     Patient has been advised of split billing requirements and indicates understanding: Yes  Patient has been having issues with neck pain? He feels like it's swollen on the base of his neck, kind of painful to touch on both sides. He has never had thyroid issues in the past but wondering if that could be an issue. Sometimes difficulty swallowing. He does have some heartburn issues as well. He also has sleep apnea and wears a cpap, could that be contributing as well?     Facial rash for years. Has not responded to topical therapies including metronidazole. Will send to dermatology.     Healthy Habits:     Getting at least 3 servings of Calcium per day:  Yes    Bi-annual eye exam:  Yes    Dental care twice a year:  Yes    Sleep apnea or symptoms of sleep apnea:  Sleep apnea    Diet:  Regular (no restrictions)    Frequency of exercise:  2-3 days/week    Duration of exercise:  Less than 15 minutes    Taking medications regularly:  Yes    Medication side effects:  None    PHQ-2 Total Score: 0    Additional concerns today:  Yes      Today's PHQ-2 Score:   PHQ-2 ( 1999 [employer]) 5/9/2022   Q1: Little interest or pleasure in doing things 0   Q2: Feeling down, depressed or hopeless 0   PHQ-2 Score 0   PHQ-2 Total Score (12-17 Years)- Positive if 3 or more points; Administer PHQ-A if positive -   Q1: Little interest or pleasure in doing things Not at all   Q2: Feeling down, depressed or hopeless Not at all   PHQ-2 Score 0       Abuse: Current or Past(Physical, Sexual or Emotional)- No  Do you feel safe in your environment? Yes        Social History     Tobacco Use     Smoking status: Never Smoker     Smokeless tobacco: Never Used   Substance Use Topics     Alcohol use: Yes     Comment: couple drinks a week     If you drink alcohol do you typically have >3 drinks per day or >7 drinks per week? No    Alcohol Use 5/9/2022    Prescreen: >3 drinks/day or >7 drinks/week? No   Prescreen: >3 drinks/day or >7 drinks/week? -       Last PSA:   PSA   Date Value Ref Range Status   09/18/2020 0.71 0 - 4 ug/L Final     Comment:     Assay Method:  Chemiluminescence using Siemens Vista analyzer     PSA Tumor Marker   Date Value Ref Range Status   11/08/2021 0.82 0.00 - 3.50 ug/L Final       Reviewed orders with patient. Reviewed health maintenance and updated orders accordingly - Yes  Lab work is in process    Reviewed and updated as needed this visit by clinical staff   Tobacco  Allergies  Meds  Problems  Med Hx  Surg Hx  Fam Hx  Soc   Hx          Reviewed and updated as needed this visit by Provider   Tobacco  Allergies  Meds  Problems  Med Hx  Surg Hx  Fam Hx           Past Medical History:   Diagnosis Date     Adhesive capsulitis of left shoulder 7/1/2020     Allergic rhinitis, cause unspecified     Allergic rhinitis     Essential hypertension, benign      History of blood transfusion      Other chronic allergic conjunctivitis     Allergic conjunctivitis     Tubular adenoma x3 09/20/2019    Surveillance colonoscopy recommended 9/2022      Past Surgical History:   Procedure Laterality Date     CLOSED REDUCTION, PERCUTANEOUS PINNING FINGER, COMBINED  5/25/2012    Procedure:COMBINED CLOSED REDUCTION, PERCUTANEOUS PINNING FINGER; Closed reductionand fixation by pinning of Right Thumb fracture       COLONOSCOPY N/A 8/25/2014    Procedure: COMBINED COLONOSCOPY, SINGLE BIOPSY/POLYPECTOMY BY BIOPSY;  Surgeon: Misael Jarvis MD;  Location:  GI     COLONOSCOPY N/A 9/20/2019    Procedure: COLONOSCOPY, WITH POLYPECTOMY AND BIOPSY;  Surgeon: Fred Edge MD;  Location: [facility]     DENTAL SURGERY       ESOPHAGOSCOPY, GASTROSCOPY, DUODENOSCOPY (EGD), COMBINED  6/17/2013    Procedure: COMBINED ESOPHAGOSCOPY, GASTROSCOPY, DUODENOSCOPY (EGD), BIOPSY SINGLE OR MULTIPLE;;  Surgeon: Benjy Marinelli MD;  Location:  GI      "ESOPHAGOSCOPY, GASTROSCOPY, DUODENOSCOPY (EGD), COMBINED N/A 8/25/2014    Procedure: COMBINED ESOPHAGOSCOPY, GASTROSCOPY, DUODENOSCOPY (EGD), BIOPSY SINGLE OR MULTIPLE;  Surgeon: Misael Jarvis MD;  Location:  GI     LASIK BILATERAL       thumb surgery       wisdom teeth removed  8-2014       Review of Systems  CONSTITUTIONAL: NEGATIVE for fever, chills, change in weight  INTEGUMENTARY/SKIN: NEGATIVE for worrisome rashes, moles or lesions  EYES: NEGATIVE for vision changes or irritation  ENT: NEGATIVE for ear, mouth and throat problems  RESP: NEGATIVE for significant cough or SOB  CV: NEGATIVE for chest pain, palpitations or peripheral edema  GI: NEGATIVE for nausea, abdominal pain, heartburn, or change in bowel habits   male: negative for dysuria, hematuria, decreased urinary stream, erectile dysfunction, urethral discharge  MUSCULOSKELETAL: NEGATIVE for significant arthralgias or myalgia  NEURO: NEGATIVE for weakness, dizziness or paresthesias  PSYCHIATRIC: NEGATIVE for changes in mood or affect    OBJECTIVE:   /88 (Cuff Size: Adult Regular)   Pulse 71   Temp 98.3  F (36.8  C)   Ht 1.715 m (5' 7.5\")   Wt 92.4 kg (203 lb 11.2 oz)   SpO2 99%   BMI 31.43 kg/m      Physical Exam  GENERAL: healthy, alert and no distress  EYES: Eyes grossly normal to inspection, PERRL and conjunctivae and sclerae normal  HENT: ear canals and TM's normal, nose and mouth without ulcers or lesions  NECK: no adenopathy, no asymmetry, masses, or scars and thyroid normal to palpation. Tenderness on both sides of neck with mild erythema, no masses or asymmetry, no nodules.   RESP: lungs clear to auscultation - no rales, rhonchi or wheezes  CV: regular rate and rhythm, normal S1 S2, no S3 or S4, no murmur, click or rub, no peripheral edema and peripheral pulses strong  ABDOMEN: soft, nontender, no hepatosplenomegaly, no masses and bowel sounds normal  MS: no gross musculoskeletal defects noted, no edema. Limited ROM of " right shoulder.   SKIN: no suspicious lesions or rashes  NEURO: Normal strength and tone, mentation intact and speech normal  PSYCH: mentation appears normal, affect normal/bright    Diagnostic Test Results:  Labs reviewed in Epic  Results for orders placed or performed in visit on 05/09/22   Comprehensive metabolic panel (BMP + Alb, Alk Phos, ALT, AST, Total. Bili, TP)     Status: Normal   Result Value Ref Range    Sodium 140 136 - 145 mmol/L    Potassium 3.8 3.5 - 5.0 mmol/L    Chloride 103 98 - 107 mmol/L    Carbon Dioxide (CO2) 26 22 - 31 mmol/L    Anion Gap 11 5 - 18 mmol/L    Urea Nitrogen 13 8 - 22 mg/dL    Creatinine 0.88 0.70 - 1.30 mg/dL    Calcium 9.3 8.5 - 10.5 mg/dL    Glucose 96 70 - 125 mg/dL    Alkaline Phosphatase 77 45 - 120 U/L    AST 16 0 - 40 U/L    ALT 21 0 - 45 U/L    Protein Total 7.1 6.0 - 8.0 g/dL    Albumin 4.3 3.5 - 5.0 g/dL    Bilirubin Total 0.8 0.0 - 1.0 mg/dL    GFR Estimate >90 >60 mL/min/1.73m2   CBC with platelets     Status: Normal   Result Value Ref Range    WBC Count 5.7 4.0 - 11.0 10e3/uL    RBC Count 5.23 4.40 - 5.90 10e6/uL    Hemoglobin 15.4 13.3 - 17.7 g/dL    Hematocrit 45.0 40.0 - 53.0 %    MCV 86 78 - 100 fL    MCH 29.4 26.5 - 33.0 pg    MCHC 34.2 31.5 - 36.5 g/dL    RDW 12.2 10.0 - 15.0 %    Platelet Count 213 150 - 450 10e3/uL   TSH with free T4 reflex     Status: Normal   Result Value Ref Range    TSH 1.10 0.30 - 5.00 uIU/mL   Lipid panel reflex to direct LDL Non-fasting     Status: Abnormal   Result Value Ref Range    Cholesterol 163 <=199 mg/dL    Triglycerides 216 (H) <=149 mg/dL    Direct Measure HDL 43 >=40 mg/dL    LDL Cholesterol Calculated 77 <=129 mg/dL    Patient Fasting > 8hrs? No    ESR: Erythrocyte sedimentation rate     Status: Normal   Result Value Ref Range    Erythrocyte Sedimentation Rate 2 0 - 15 mm/hr   CRP, inflammation     Status: Normal   Result Value Ref Range    CRP 0.2 0.0 - 0.8 mg/dL       ASSESSMENT/PLAN:   Gregory was seen today for  physical.    Diagnoses and all orders for this visit:  Routine general medical examination at a health care facility      PATTI (obstructive sleep apnea)  Hypertension goal BP (blood pressure) < 140/90  Hyperlipidemia LDL goal <130  Borderline, patient wants to do lifestyle modifications in hopes of coming off medications in the future. Continue lisinopril 20 for now.   -     Comprehensive metabolic panel (BMP + Alb, Alk Phos, ALT, AST, Total. Bili, TP)  - Continue lisinopril 20mg  - Continue atorvastatin  -     Lipid panel reflex to direct LDL Non-fasting    Screening, anemia, deficiency, iron  -     CBC with platelets    Immunization due  -     TDAP VACCINE (Adacel, Boostrix)    High priority for 2019 novel coronavirus vaccination  -     Cancel: COVID-19,PF,MODERNA (18+ YRS BOOSTER .25ML)  -     COVID-19,PF,PFIZER (12+ Yrs GRAY LABEL)    Adhesive capsulitis of right shoulder  -     Physical Therapy Referral; Future    Facial rash  Possibly rosacea  -     Adult Dermatology Referral; Future  -     ESR: Erythrocyte sedimentation rate  -     CRP, inflammation    Chest pain, unspecified type  Throat pain  Screening for thyroid disorder  Possibly GERD symptoms? He has follow up with his GI doc in a few months. Continue esomepraozle for now. His neck pain is strange - unclear if it's related to his facial rash, GERD, thyroid issues, or his CPAP. No lymphadenopathy and other than tenderness on either side of neck, no other symptoms. Will check labs for thyroid and inflammation. Can consider ENT if symptoms persist.   -     ESR: Erythrocyte sedimentation rate; Future  -     CRP, inflammation; Future  -     ESR: Erythrocyte sedimentation rate  -     CRP, inflammation  - TSH with reflex to T4            COUNSELING:   Reviewed preventive health counseling, as reflected in patient instructions       Regular exercise       Healthy diet/nutrition       Vision screening       Hearing screening       Advance Care  "Planning    Estimated body mass index is 31.43 kg/m  as calculated from the following:    Height as of this encounter: 1.715 m (5' 7.5\").    Weight as of this encounter: 92.4 kg (203 lb 11.2 oz).     Weight management plan: Discussed healthy diet and exercise guidelines    He reports that he has never smoked. He has never used smokeless tobacco.        Pastora Olivo MD  [facility]  "

## 2022-06-29 ASSESSMENT — ENCOUNTER SYMPTOMS
SMELL DISTURBANCE: 0
JAUNDICE: 0
RECTAL PAIN: 0
ARTHRALGIAS: 1
MYALGIAS: 1
TROUBLE SWALLOWING: 0
TASTE DISTURBANCE: 0
MUSCLE WEAKNESS: 0
BLOATING: 0
SINUS CONGESTION: 0
ABDOMINAL PAIN: 0
STIFFNESS: 1
BLOOD IN STOOL: 0
VOMITING: 0
NAUSEA: 1
JOINT SWELLING: 0
HEARTBURN: 1
SINUS PAIN: 0
DIARRHEA: 0
MUSCLE CRAMPS: 0
NECK MASS: 0
NECK PAIN: 1
HOARSE VOICE: 1
BOWEL INCONTINENCE: 0
SORE THROAT: 1
BACK PAIN: 1
CONSTIPATION: 0

## 2022-07-06 ENCOUNTER — VIRTUAL VISIT (OUTPATIENT)
Dept: GASTROENTEROLOGY | Facility: CLINIC | Age: 52
End: 2022-07-06
Payer: COMMERCIAL

## 2022-07-06 DIAGNOSIS — K21.9 GASTROESOPHAGEAL REFLUX DISEASE WITHOUT ESOPHAGITIS: ICD-10-CM

## 2022-07-06 PROCEDURE — 99213 OFFICE O/P EST LOW 20 MIN: CPT | Mod: 95 | Performed by: INTERNAL MEDICINE

## 2022-07-06 RX ORDER — BACLOFEN 10 MG/1
5 TABLET ORAL 2 TIMES DAILY
Qty: 90 TABLET | Refills: 1 | Status: SHIPPED | OUTPATIENT
Start: 2022-07-06 | End: 2023-07-05

## 2022-07-06 NOTE — PROGRESS NOTES
Hemant is a 52 year old who is being evaluated via a billable video visit.      How would you like to obtain your AVS? MyChart  If the video visit is dropped, the invitation should be resent by: Text to cell phone: [phone]  Will anyone else be joining your video visit? No

## 2022-07-06 NOTE — PATIENT INSTRUCTIONS
Continue your baclofen and esomeprazole. I'm glad these things seem to be helping. You can try increasing the baclofen to 3 times a day during the periods where your symptoms are worse.    Stop taking pepcid every night (famotidine) - instead switch to as needed when you notice the episodes of worsening symptoms.  You may also want to try it for the discomfort you feel in your throat.      You should have another colonoscopy 3-5 years from your last in 2019 (sometime between now and 2024)

## 2022-07-06 NOTE — PROGRESS NOTES
HPI:    Hemant  presents today for a video visit for follow-up.  Overall symptoms have improved - about 90% better than they were.  Has episodes of worsening symptoms that will last about a week - last was 3 weeks ago but don't happen that often.  No known inciting events.  Has been taking baclofen BID which he does thinks makes a difference.  Also on nexium.  Takes famotidine every night - doesn't know if it helps.    When his symptoms are worse has tried OTC antacids but don't seem to help.    Occasionally gets a discomfort in the back of his throat - doesn't feel like his normal symptoms from post nasal drip.      Past Medical History:   Diagnosis Date     Adhesive capsulitis of left shoulder 7/1/2020     Allergic rhinitis, cause unspecified     Allergic rhinitis     Essential hypertension, benign      History of blood transfusion      Other chronic allergic conjunctivitis     Allergic conjunctivitis     Tubular adenoma x3 09/20/2019    Surveillance colonoscopy recommended 9/2022       Past Surgical History:   Procedure Laterality Date     CLOSED REDUCTION, PERCUTANEOUS PINNING FINGER, COMBINED  5/25/2012    Procedure:COMBINED CLOSED REDUCTION, PERCUTANEOUS PINNING FINGER; Closed reductionand fixation by pinning of Right Thumb fracture       COLONOSCOPY N/A 8/25/2014    Procedure: COMBINED COLONOSCOPY, SINGLE BIOPSY/POLYPECTOMY BY BIOPSY;  Surgeon: Misael Jarvis MD;  Location:  GI     COLONOSCOPY N/A 9/20/2019    Procedure: COLONOSCOPY, WITH POLYPECTOMY AND BIOPSY;  Surgeon: Fred Edge MD;  Location:  OR     DENTAL SURGERY       ESOPHAGOSCOPY, GASTROSCOPY, DUODENOSCOPY (EGD), COMBINED  6/17/2013    Procedure: COMBINED ESOPHAGOSCOPY, GASTROSCOPY, DUODENOSCOPY (EGD), BIOPSY SINGLE OR MULTIPLE;;  Surgeon: Benjy Marinelli MD;  Location:  GI     ESOPHAGOSCOPY, GASTROSCOPY, DUODENOSCOPY (EGD), COMBINED N/A 8/25/2014    Procedure: COMBINED ESOPHAGOSCOPY, GASTROSCOPY, DUODENOSCOPY  (EGD), BIOPSY SINGLE OR MULTIPLE;  Surgeon: Misael Jarvis MD;  Location:  GI     LASIK BILATERAL       thumb surgery       wisdom teeth removed  8-2014       Family History   Problem Relation Age of Onset     Breast Cancer Mother      Arthritis Father      Genitourinary Problems Father         stones     Cancer Father         skin     C.A.D. Father         stents/age late 60s     Coronary Artery Disease Father         Stents     Prostate Cancer Father         late 50s     Snoring Father      Asthma Sister      Depression Sister      Cancer Paternal Grandfather         ? type     Eye Disorder Paternal Grandfather         cateracts       Social History     Tobacco Use     Smoking status: Never Smoker     Smokeless tobacco: Never Used   Substance Use Topics     Alcohol use: Yes     Comment: couple drinks a week        O:    Gen: no acute distress  HEENT: NCAT  Neck: normal ROM  Resp: nonlabored breathing  Neuro: no gross deficits  Psych: appropriate mood and affect    Assessment and Plan:  # reflux symptoms, atypical chest pain - extensive evaluation including EGDs with biopsies and bravo unrevealing.  Previously did not tolerate TCA.  Doing better at present with baclofen and esomeprazole (reports 90% improved which is excellent) although still some break through symptoms.  For now - will try stopping pepcid as unclear if it is helping.  Can instead switch to using that as needed with worsening symptoms.  Can also try increasing baclofen to 3 times a day when symptoms are increased    # history of tubular adenomas - 3 small TAs on c-scope in 2019 - would recommend a repeat within the next 2 years.    RTC 6-12 months or sooner if needed    Alexa Cordero, DO     Video-Visit Details     Type of service:  Video Visit     Video Start Time: 3:30 PM  Video End Time (time video stopped): 3:43 PM    Originating Location (pt. Location): home     Distant Location (provider location):  [facility]       Mode of Communication:  Video Conference via MADS    Answers for HPI/ROS submitted by the patient on 6/29/2022  General Symptoms: No  Skin Symptoms: No  HENT Symptoms: Yes  EYE SYMPTOMS: No  HEART SYMPTOMS: No  LUNG SYMPTOMS: No  INTESTINAL SYMPTOMS: Yes  URINARY SYMPTOMS: No  REPRODUCTIVE SYMPTOMS: No  SKELETAL SYMPTOMS: Yes  BLOOD SYMPTOMS: No  NERVOUS SYSTEM SYMPTOMS: No  MENTAL HEALTH SYMPTOMS: No  Ear pain: No  Ear discharge: No  Hearing loss: No  Tinnitus: No  Nosebleeds: No  Congestion: No  Sinus pain: No  Trouble swallowing: No   Voice hoarseness: Yes  Mouth sores: No  Sore throat: Yes  Tooth pain: No  Gum tenderness: No  Bleeding gums: No  Change in taste: No  Change in sense of smell: No  Dry mouth: No  Hearing aid used: No  Neck lump: No  Heart burn or indigestion: Yes  Nausea: Yes  Vomiting: No  Abdominal pain: No  Bloating: No  Constipation: No  Diarrhea: No  Blood in stool: No  Black stools: No  Rectal or Anal pain: No  Fecal incontinence: No  Yellowing of skin or eyes: No  Vomit with blood: No  Change in stools: No  Back pain: Yes  Muscle aches: Yes  Neck pain: Yes  Swollen joints: No  Joint pain: Yes  Bone pain: No  Muscle cramps: No  Muscle weakness: No  Joint stiffness: Yes  Bone fracture: No

## 2022-08-13 ENCOUNTER — E-VISIT (OUTPATIENT)
Dept: URGENT CARE | Facility: CLINIC | Age: 52
End: 2022-08-13
Payer: COMMERCIAL

## 2022-08-13 DIAGNOSIS — R06.02 SOB (SHORTNESS OF BREATH): Primary | ICD-10-CM

## 2022-08-13 PROCEDURE — 99207 PR NON-BILLABLE SERV PER CHARTING: CPT | Performed by: NURSE PRACTITIONER

## 2022-08-13 NOTE — PATIENT INSTRUCTIONS
Dear Gregory Aguilera,    We are sorry you are not feeling well. Based on the responses you provided, you may be experiencing a serious health condition that needs immediate in-person attention. It is recommended that you be seen in the emergency room in order to better evaluate your symptoms. Please click here to find the nearest Emergency Room.     DALE Koenig CNP

## 2022-08-16 ENCOUNTER — OFFICE VISIT (OUTPATIENT)
Dept: URGENT CARE | Facility: URGENT CARE | Age: 52
End: 2022-08-16
Payer: COMMERCIAL

## 2022-08-16 VITALS
OXYGEN SATURATION: 98 % | HEART RATE: 82 BPM | DIASTOLIC BLOOD PRESSURE: 88 MMHG | SYSTOLIC BLOOD PRESSURE: 127 MMHG | TEMPERATURE: 97.3 F

## 2022-08-16 DIAGNOSIS — R42 LIGHTHEADEDNESS: Primary | ICD-10-CM

## 2022-08-16 DIAGNOSIS — R06.02 SOB (SHORTNESS OF BREATH): ICD-10-CM

## 2022-08-16 PROCEDURE — 99214 OFFICE O/P EST MOD 30 MIN: CPT | Performed by: NURSE PRACTITIONER

## 2022-08-16 NOTE — PROGRESS NOTES
Chief Complaint   Patient presents with     Dizziness     Pt has been feeling lightheaded, the last week and a half, kind of feels like drunk/high, sometimes short of breath, pt has esophogus issues, pt did have covid a bmonth ago, pt does experience muscle pain in the chest, pt has a bad right shoulder     SUBJECTIVE:  Gregory Aguilera is a 52 year old male who presents to the clinic today with subtle symptoms of feeling lightheaded, facial pressure and winded for 10 days. It is intermittent and mild. He wonders if his anxiety is worsening the symptoms. Has baseline esophagitis, seasonal allergic rhinosinusitis, post nasal drip, wears a cpap, just got over covid a month ago, muscle pains for adhesive capsulitis. Pt declines fever, cough, hemoptysis, calf pain, swelling, palpitations, chest pain. He explains he had a full cardiac workup in 2020 and is not worried about his heart, more so about the lightheadedness.    Past Medical History:   Diagnosis Date     Adhesive capsulitis of left shoulder 7/1/2020     Allergic rhinitis, cause unspecified     Allergic rhinitis     Essential hypertension, benign      History of blood transfusion      Other chronic allergic conjunctivitis     Allergic conjunctivitis     Tubular adenoma x3 09/20/2019    Surveillance colonoscopy recommended 9/2022     acetaminophen (TYLENOL) 325 MG tablet, Take 325-650 mg by mouth every 6 hours as needed for mild pain  aspirin (ASA) 81 MG EC tablet, Take 1 tablet (81 mg) by mouth daily  atorvastatin (LIPITOR) 20 MG tablet, Take 1 tablet (20 mg) by mouth daily Please schedule clinic appt for refills, [phone], lab due  cyclobenzaprine (FLEXERIL) 5 MG tablet, TAKE 1 TABLET (5 MG) BY MOUTH AT BEDTIME AS NEEDED, MAY REPEAT ONCE FOR MUSCLE SPASMS  esomeprazole (NEXIUM) 20 MG DR capsule, Take 1 capsule (20 mg) by mouth every morning (before breakfast) Take 30-60 minutes before eating.  fluticasone (FLONASE) 50 MCG/ACT nasal spray, Spray 1 spray  into both nostrils daily  ibuprofen (ADVIL/MOTRIN) 200 MG tablet, Take 400 mg by mouth every 4 hours as needed for mild pain  lisinopril (ZESTRIL) 20 MG tablet, TAKE 1 TABLET BY MOUTH EVERY DAY  loratadine (CLARITIN REDITABS) 10 MG dispersible tablet, Take 10 mg by mouth as needed   baclofen (LIORESAL) 10 MG tablet, Take 0.5 tablets (5 mg) by mouth 2 times daily (Patient not taking: Reported on 8/16/2022)  oxybutynin (DITROPAN) 5 MG tablet, Take 1 tablet (5 mg) by mouth 3 times daily (Patient not taking: Reported on 8/16/2022)  Probiotic Product (PROBIOTIC DAILY PO), Take 1 capsule by mouth as needed (Patient not taking: Reported on 8/16/2022)  Wheat Dextrin (BENEFIBER DRINK MIX PO), Take by mouth as needed    No current facility-administered medications on file prior to visit.    Social History     Tobacco Use     Smoking status: Never Smoker     Smokeless tobacco: Never Used   Substance Use Topics     Alcohol use: Yes     Comment: couple drinks a week     Allergies   Allergen Reactions     Dust Mites      Grass      Mold      No Known Drug Allergies      Trees      Review of Systems   All systems negative except for those listed above in HPI.    EXAM:   /88 (BP Location: Right arm, Patient Position: Sitting, Cuff Size: Adult Regular)   Pulse 82   Temp 97.3  F (36.3  C) (Temporal)   SpO2 98%     Physical Exam  Vitals reviewed.   Constitutional:       General: He is not in acute distress.     Appearance: Normal appearance. He is not ill-appearing, toxic-appearing or diaphoretic.   HENT:      Head: Normocephalic and atraumatic.      Ears:      Comments: Right tympanic membrane is bulging with clear center and left upper corner with blood tinge.     Nose: Nose normal.      Mouth/Throat:      Mouth: Mucous membranes are moist.      Pharynx: Oropharynx is clear.   Eyes:      Extraocular Movements: Extraocular movements intact.      Conjunctiva/sclera: Conjunctivae normal.      Pupils: Pupils are equal, round,  and reactive to light.   Cardiovascular:      Rate and Rhythm: Normal rate.      Pulses: Normal pulses.      Heart sounds: Normal heart sounds. No murmur heard.    No friction rub. No gallop.   Pulmonary:      Effort: Pulmonary effort is normal. No respiratory distress.      Breath sounds: Normal breath sounds. No stridor. No wheezing, rhonchi or rales.   Chest:      Chest wall: No tenderness.   Musculoskeletal:         General: Normal range of motion.      Cervical back: Normal range of motion and neck supple.      Right lower leg: No edema.      Left lower leg: No edema.   Skin:     General: Skin is warm and dry.      Findings: No rash.   Neurological:      General: No focal deficit present.      Mental Status: He is alert and oriented to person, place, and time.   Psychiatric:         Mood and Affect: Mood normal.         Behavior: Behavior normal.       ASSESSMENT:    ICD-10-CM    1. Lightheadedness  R42    2. SOB (shortness of breath)  R06.02      PLAN:    Possible post covid viral syndrome  Ear tympanic membrane is bulging with slight blood tinge, vestibular dysequilibrium  Add in antihistamines and flonase  Shortness of breath reactive airway, anxiety versus esophagitis  Vitals stable, exam reassuring   Pt declines EKG, x-ray, labs here today  PCP if lingering  ER if severe worsening    Follow up with primary care provider with any problems, questions or concerns or if symptoms worsen or fail to improve. Patient agreed to plan and verbalized understanding.    Michelle Santana, [facility] [place]

## 2022-08-22 DIAGNOSIS — R07.9 CHEST PAIN, UNSPECIFIED TYPE: ICD-10-CM

## 2022-08-25 RX ORDER — ATORVASTATIN CALCIUM 20 MG/1
20 TABLET, FILM COATED ORAL DAILY
Qty: 90 TABLET | Refills: 1 | Status: SHIPPED | OUTPATIENT
Start: 2022-08-25 | End: 2023-01-12

## 2022-08-25 NOTE — TELEPHONE ENCOUNTER
atorvastatin (LIPITOR) 20 MG tablet      Last Written Prescription Date:  9/1/2021  Last Fill Quantity: 90,   # refills: 3  Last Office Visit : 9/16/2020  [facility]  Future Office visit:  none    Routing refill request to provider for review/approval because:  Failed medication protocol: appointment  - >18 months last seen  - no future visits.

## 2022-08-30 ENCOUNTER — THERAPY VISIT (OUTPATIENT)
Dept: PHYSICAL THERAPY | Facility: CLINIC | Age: 52
End: 2022-08-30
Payer: COMMERCIAL

## 2022-08-30 DIAGNOSIS — M75.01 ADHESIVE CAPSULITIS OF RIGHT SHOULDER: ICD-10-CM

## 2022-08-30 PROCEDURE — 97161 PT EVAL LOW COMPLEX 20 MIN: CPT | Mod: GP | Performed by: PHYSICAL THERAPIST

## 2022-08-30 PROCEDURE — 97110 THERAPEUTIC EXERCISES: CPT | Mod: GP | Performed by: PHYSICAL THERAPIST

## 2022-08-30 PROCEDURE — 97530 THERAPEUTIC ACTIVITIES: CPT | Mod: GP | Performed by: PHYSICAL THERAPIST

## 2022-08-30 NOTE — PROGRESS NOTES
KEY PT FINDINGS:  1) Limited passive range of motion with painful end range  2) No pain with resisted testing  3) Did not tolerate stretching - painful at end range.     Physical Therapy Initial Evaluation: Subjective History     Injury/Condition Details:  Presenting Complaint Right shoulder   Onset Timing/Date 1 year ago   Mechanism Has been getting worse over the past year.   It is more painful than the left one was. It radiates to the front, down the ribs.   Wondering if how he sleeps makes a difference.   No injury or trigger to this pain bout.      Symptom Behavior Details    Primary Symptoms Constant symptoms; worsen with activity, pain (Location: global shoulder pain, Quality: Aching/Throbbing), stiffness, weakness   Worst Pain 10/10 (with reaching too far)   Symptom Provocators Reaching  Pulling on/off shirts  Extending away from the body  Laying on the right side.    Best Pain 0/10    Symptom Relievers At rest   Time of day dependent? No   Recent symptom change? symptoms worsening     Prior Testing/Intervention for current condition:  Prior Tests None   Prior Treatment PT > left shoulder exercise, occasional ibuprofen.      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   Usual physical activities  (within past year)    Orthopaedic history See Epic Chart   Notable medical history See Epic Chart   Patient Reported Health good     Answers for HPI/ROS submitted by the patient on 8/29/2022  Reason for Visit:: Shoulder pain  When problem began:: 1/1/2022  How problem occurred:: Unknown start date  or cause  Number scale: 5/10  General health as reported by patient: good  Please check all that apply to your current or past medical history: high blood pressure, migraines/headaches, overweight, pain at night/rest, sleep disorder/apnea  Medical allergies: none  Surgeries: other  Other Surgery Detail: Thumb  Medications you are currently taking: high blood pressure medication, other  Other Meds  Detail: cholesterol  Occupation:: legal publishing  What are your primary job tasks: computer work, prolonged sitting          Physical Therapy Initial Evaluation: Objective Testing  SHOULDER:    Cervical Screen: normal, no limitations in motion, no provocation of shoulder pain    Posture: mild forward head, rounded shoulders    Shoulder:   ROM L ROM R MMT/Resisted Testing L MMT/Resisted Testing R   Flexion 170 111 (118 P)  -   Abduction 165 120  -   IR L1 Lateral Hip  -   ER 50 35  -   Mid Trap MMT: /5 - could not get into position to test  Low Trap MMT: /5 - could not get into position to test    Scapulothoracic Screen: Not assess due to limited ROM      Assessment/Plan:  Patient is a 52 year old male with right side shoulder complaints.    Patient has the following significant findings with corresponding treatment plan.                Diagnosis 1:  Shoulder, right, adhesive capsulitis  Pain -  hot/cold therapy, manual therapy, self management and home program  Decreased ROM/flexibility - manual therapy, therapeutic exercise and home program  Decreased joint mobility - manual therapy, therapeutic exercise and home program  Decreased strength - therapeutic exercise, therapeutic activities and home program  Impaired muscle performance - neuro re-education and home program  Decreased function - therapeutic activities and home program    Therapy Evaluation Codes:   1) History comprised of:   Personal factors that impact the plan of care:      None.    Comorbidity factors that impact the plan of care are:      None.     Medications impacting care: None.  2) Examination of Body Systems comprised of:   Body structures and functions that impact the plan of care:      Shoulder.   Activity limitations that impact the plan of care are:      Dressing, Lifting and Throwing.  3) Clinical presentation characteristics are:   Stable/Uncomplicated.  4) Decision-Making    Low complexity using standardized patient assessment  instrument and/or measureable assessment of functional outcome.  Cumulative Therapy Evaluation is: Low complexity.    Previous and current functional limitations:  (See Goal Flow Sheet for this information)    Short term and Long term goals: (See Goal Flow Sheet for this information)     Communication ability:  Patient appears to be able to clearly communicate and understand verbal and written communication and follow directions correctly.  Treatment Explanation - The following has been discussed with the patient:   RX ordered/plan of care  Anticipated outcomes  Possible risks and side effects  This patient would benefit from PT intervention to resume normal activities.   Rehab potential is good.    Frequency:  1 X week, once daily  Duration:  for 6 weeks  Discharge Plan:  Achieve all LTG.  Independent in home treatment program.  Reach maximal therapeutic benefit.    Please refer to the daily flowsheet for treatment today, total treatment time and time spent performing 1:1 timed codes.

## 2022-09-13 ENCOUNTER — THERAPY VISIT (OUTPATIENT)
Dept: PHYSICAL THERAPY | Facility: CLINIC | Age: 52
End: 2022-09-13
Payer: COMMERCIAL

## 2022-09-13 DIAGNOSIS — M75.01 ADHESIVE CAPSULITIS OF RIGHT SHOULDER: Primary | ICD-10-CM

## 2022-09-13 PROCEDURE — 97110 THERAPEUTIC EXERCISES: CPT | Mod: GP | Performed by: PHYSICAL THERAPIST

## 2022-09-13 PROCEDURE — 97140 MANUAL THERAPY 1/> REGIONS: CPT | Mod: GP | Performed by: PHYSICAL THERAPIST

## 2022-09-27 ENCOUNTER — THERAPY VISIT (OUTPATIENT)
Dept: PHYSICAL THERAPY | Facility: CLINIC | Age: 52
End: 2022-09-27
Payer: COMMERCIAL

## 2022-09-27 DIAGNOSIS — M75.01 ADHESIVE CAPSULITIS OF RIGHT SHOULDER: Primary | ICD-10-CM

## 2022-09-27 PROCEDURE — 97140 MANUAL THERAPY 1/> REGIONS: CPT | Mod: GP | Performed by: PHYSICAL THERAPIST

## 2022-09-27 PROCEDURE — 97110 THERAPEUTIC EXERCISES: CPT | Mod: GP | Performed by: PHYSICAL THERAPIST

## 2022-10-11 ENCOUNTER — THERAPY VISIT (OUTPATIENT)
Dept: PHYSICAL THERAPY | Facility: CLINIC | Age: 52
End: 2022-10-11
Payer: COMMERCIAL

## 2022-10-11 DIAGNOSIS — M75.01 ADHESIVE CAPSULITIS OF RIGHT SHOULDER: Primary | ICD-10-CM

## 2022-10-11 PROCEDURE — 97140 MANUAL THERAPY 1/> REGIONS: CPT | Mod: GP | Performed by: PHYSICAL THERAPIST

## 2022-10-11 PROCEDURE — 97110 THERAPEUTIC EXERCISES: CPT | Mod: GP | Performed by: PHYSICAL THERAPIST

## 2022-10-20 ENCOUNTER — TELEPHONE (OUTPATIENT)
Dept: FAMILY MEDICINE | Facility: CLINIC | Age: 52
End: 2022-10-20

## 2022-10-20 NOTE — TELEPHONE ENCOUNTER
"Dr. Olivo,  Please see request below from patient requested a referral to a specialist.      From:Gregory Aguilera \"Hemant\"       Sent:10/18/2022 11:11 AM CDT         To:Patient Referral Message Message List    Subject:Referral Request    Gregory Aguilera would like to request a referral.  Reason: Recurring pain/ache on right side, mid-back  Requested provider: Calos Schultz  Comment:  Concerned that I may have kidney disease/issue. Recurring pain in what I believe is location of right kidney. History of high blood pressure (30+ years). Frequent urination.          Thank you    Jhonathan  "

## 2022-10-24 ENCOUNTER — IMMUNIZATION (OUTPATIENT)
Dept: FAMILY MEDICINE | Facility: CLINIC | Age: 52
End: 2022-10-24
Payer: COMMERCIAL

## 2022-10-24 PROCEDURE — 90682 RIV4 VACC RECOMBINANT DNA IM: CPT

## 2022-10-24 PROCEDURE — 0124A COVID-19,PF,PFIZER BOOSTER BIVALENT: CPT

## 2022-10-24 PROCEDURE — 91312 COVID-19,PF,PFIZER BOOSTER BIVALENT: CPT

## 2022-10-24 PROCEDURE — 90471 IMMUNIZATION ADMIN: CPT

## 2022-10-25 ENCOUNTER — THERAPY VISIT (OUTPATIENT)
Dept: PHYSICAL THERAPY | Facility: CLINIC | Age: 52
End: 2022-10-25
Payer: COMMERCIAL

## 2022-10-25 DIAGNOSIS — M75.01 ADHESIVE CAPSULITIS OF RIGHT SHOULDER: Primary | ICD-10-CM

## 2022-10-25 PROCEDURE — 97110 THERAPEUTIC EXERCISES: CPT | Mod: GP | Performed by: PHYSICAL THERAPIST

## 2022-10-25 PROCEDURE — 97140 MANUAL THERAPY 1/> REGIONS: CPT | Mod: GP | Performed by: PHYSICAL THERAPIST

## 2022-11-15 ENCOUNTER — THERAPY VISIT (OUTPATIENT)
Dept: PHYSICAL THERAPY | Facility: CLINIC | Age: 52
End: 2022-11-15
Payer: COMMERCIAL

## 2022-11-15 DIAGNOSIS — M75.01 ADHESIVE CAPSULITIS OF RIGHT SHOULDER: Primary | ICD-10-CM

## 2022-11-15 PROCEDURE — 97110 THERAPEUTIC EXERCISES: CPT | Mod: GP | Performed by: PHYSICAL THERAPIST

## 2022-11-15 PROCEDURE — 97140 MANUAL THERAPY 1/> REGIONS: CPT | Mod: GP | Performed by: PHYSICAL THERAPIST

## 2022-12-13 ENCOUNTER — THERAPY VISIT (OUTPATIENT)
Dept: PHYSICAL THERAPY | Facility: CLINIC | Age: 52
End: 2022-12-13
Payer: COMMERCIAL

## 2022-12-13 DIAGNOSIS — M75.01 ADHESIVE CAPSULITIS OF RIGHT SHOULDER: Primary | ICD-10-CM

## 2022-12-13 PROCEDURE — 97110 THERAPEUTIC EXERCISES: CPT | Mod: GP | Performed by: PHYSICAL THERAPIST

## 2022-12-13 PROCEDURE — 97140 MANUAL THERAPY 1/> REGIONS: CPT | Mod: GP | Performed by: PHYSICAL THERAPIST

## 2023-01-12 DIAGNOSIS — R07.9 CHEST PAIN, UNSPECIFIED TYPE: ICD-10-CM

## 2023-01-12 DIAGNOSIS — Z76.0 ENCOUNTER FOR MEDICATION REFILL: Primary | ICD-10-CM

## 2023-01-12 RX ORDER — ATORVASTATIN CALCIUM 20 MG/1
20 TABLET, FILM COATED ORAL DAILY
Qty: 90 TABLET | Refills: 1 | Status: SHIPPED | OUTPATIENT
Start: 2023-01-12 | End: 2023-07-04

## 2023-01-12 NOTE — TELEPHONE ENCOUNTER
Medication Question or Refill    Contacts       Type Contact Phone/Fax    01/12/2023 09:14 AM CST Phone (Incoming) WillyHemant paige (Self) [phone] (H)          What medication are you calling about (include dose and sig)?: atorvastatin (LIPITOR) 20 MG tablet    Controlled Substance Agreement on file:   CSA -- Patient Level:    CSA: None found at the patient level.       Who prescribed the medication?: Previous PCP    Do you need a refill? Yes: Out as of today, please if not a full refill, enough uintil appt on 02/22/23    When did you use the medication last? 01/12/23    Patient offered an appointment? No    Do you have any questions or concerns?  No    Preferred Pharmacy:    [facility] [id number] [place] [address] [address]  Phone: [phone] Fax: [phone]      Could we send this information to you in "[employer] or would you prefer to receive a phone call?:   Patient would prefer a phone call   Okay to leave a detailed message?: Yes at Home number on file [phone] (home)

## 2023-02-21 ASSESSMENT — ENCOUNTER SYMPTOMS
ARTHRALGIAS: 0
NAUSEA: 0
HEADACHES: 0
PARESTHESIAS: 0
JOINT SWELLING: 0
NERVOUS/ANXIOUS: 0
CONSTIPATION: 0
PALPITATIONS: 0
DIARRHEA: 0
HEARTBURN: 0
HEMATURIA: 0
WEAKNESS: 0
DIZZINESS: 0
EYE PAIN: 0
FEVER: 0
COUGH: 1
SHORTNESS OF BREATH: 0
SORE THROAT: 0
DYSURIA: 0
CHILLS: 0
HEMATOCHEZIA: 0
FREQUENCY: 0
MYALGIAS: 0
ABDOMINAL PAIN: 0

## 2023-02-22 ENCOUNTER — OFFICE VISIT (OUTPATIENT)
Dept: FAMILY MEDICINE | Facility: CLINIC | Age: 53
End: 2023-02-22
Payer: COMMERCIAL

## 2023-02-22 VITALS
HEIGHT: 68 IN | HEART RATE: 59 BPM | DIASTOLIC BLOOD PRESSURE: 82 MMHG | OXYGEN SATURATION: 97 % | WEIGHT: 200 LBS | RESPIRATION RATE: 16 BRPM | TEMPERATURE: 98 F | BODY MASS INDEX: 30.31 KG/M2 | SYSTOLIC BLOOD PRESSURE: 130 MMHG

## 2023-02-22 DIAGNOSIS — G89.29 CHRONIC RIGHT-SIDED THORACIC BACK PAIN: ICD-10-CM

## 2023-02-22 DIAGNOSIS — Z12.5 SCREENING FOR PROSTATE CANCER: ICD-10-CM

## 2023-02-22 DIAGNOSIS — J98.01 BRONCHOSPASM: ICD-10-CM

## 2023-02-22 DIAGNOSIS — G47.33 OSA (OBSTRUCTIVE SLEEP APNEA): Chronic | ICD-10-CM

## 2023-02-22 DIAGNOSIS — J40 BRONCHITIS: ICD-10-CM

## 2023-02-22 DIAGNOSIS — Z76.0 ENCOUNTER FOR MEDICATION REFILL: ICD-10-CM

## 2023-02-22 DIAGNOSIS — I10 HYPERTENSION GOAL BP (BLOOD PRESSURE) < 140/90: ICD-10-CM

## 2023-02-22 DIAGNOSIS — M62.838 MUSCLE SPASM: ICD-10-CM

## 2023-02-22 DIAGNOSIS — M54.6 CHRONIC RIGHT-SIDED THORACIC BACK PAIN: ICD-10-CM

## 2023-02-22 DIAGNOSIS — Z00.00 ROUTINE GENERAL MEDICAL EXAMINATION AT A HEALTH CARE FACILITY: ICD-10-CM

## 2023-02-22 DIAGNOSIS — Z13.1 SCREENING FOR DIABETES MELLITUS: ICD-10-CM

## 2023-02-22 DIAGNOSIS — Z87.442 HISTORY OF KIDNEY STONES: Primary | ICD-10-CM

## 2023-02-22 LAB
ALBUMIN SERPL BCG-MCNC: 4.6 G/DL (ref 3.5–5.2)
ALBUMIN UR-MCNC: NEGATIVE MG/DL
ALP SERPL-CCNC: 84 U/L (ref 40–129)
ALT SERPL W P-5'-P-CCNC: 30 U/L (ref 10–50)
ANION GAP SERPL CALCULATED.3IONS-SCNC: 12 MMOL/L (ref 7–15)
APPEARANCE UR: CLEAR
AST SERPL W P-5'-P-CCNC: 26 U/L (ref 10–50)
BILIRUB SERPL-MCNC: 0.4 MG/DL
BILIRUB UR QL STRIP: NEGATIVE
BUN SERPL-MCNC: 16.9 MG/DL (ref 6–20)
CALCIUM SERPL-MCNC: 9.6 MG/DL (ref 8.6–10)
CHLORIDE SERPL-SCNC: 104 MMOL/L (ref 98–107)
COLOR UR AUTO: YELLOW
CREAT SERPL-MCNC: 0.91 MG/DL (ref 0.67–1.17)
DEPRECATED HCO3 PLAS-SCNC: 26 MMOL/L (ref 22–29)
GFR SERPL CREATININE-BSD FRML MDRD: >90 ML/MIN/1.73M2
GLUCOSE SERPL-MCNC: 102 MG/DL (ref 70–99)
GLUCOSE UR STRIP-MCNC: NEGATIVE MG/DL
HGB UR QL STRIP: NEGATIVE
KETONES UR STRIP-MCNC: NEGATIVE MG/DL
LEUKOCYTE ESTERASE UR QL STRIP: NEGATIVE
NITRATE UR QL: NEGATIVE
PH UR STRIP: 5.5 [PH] (ref 5–7)
POTASSIUM SERPL-SCNC: 4.8 MMOL/L (ref 3.4–5.3)
PROT SERPL-MCNC: 7.4 G/DL (ref 6.4–8.3)
PSA SERPL-MCNC: 1.03 NG/ML (ref 0–3.5)
SODIUM SERPL-SCNC: 142 MMOL/L (ref 136–145)
SP GR UR STRIP: 1.02 (ref 1–1.03)
UROBILINOGEN UR STRIP-ACNC: 0.2 E.U./DL

## 2023-02-22 PROCEDURE — 36415 COLL VENOUS BLD VENIPUNCTURE: CPT | Performed by: FAMILY MEDICINE

## 2023-02-22 PROCEDURE — G0103 PSA SCREENING: HCPCS | Performed by: FAMILY MEDICINE

## 2023-02-22 PROCEDURE — 81003 URINALYSIS AUTO W/O SCOPE: CPT | Performed by: FAMILY MEDICINE

## 2023-02-22 PROCEDURE — 99214 OFFICE O/P EST MOD 30 MIN: CPT | Mod: 25 | Performed by: FAMILY MEDICINE

## 2023-02-22 PROCEDURE — 80053 COMPREHEN METABOLIC PANEL: CPT | Performed by: FAMILY MEDICINE

## 2023-02-22 PROCEDURE — 99396 PREV VISIT EST AGE 40-64: CPT | Performed by: FAMILY MEDICINE

## 2023-02-22 RX ORDER — AZITHROMYCIN 250 MG/1
TABLET, FILM COATED ORAL
Qty: 6 TABLET | Refills: 0 | Status: SHIPPED | OUTPATIENT
Start: 2023-02-22 | End: 2023-02-27

## 2023-02-22 RX ORDER — ALBUTEROL SULFATE 90 UG/1
2 AEROSOL, METERED RESPIRATORY (INHALATION) EVERY 6 HOURS PRN
Qty: 18 G | Refills: 0 | Status: SHIPPED | OUTPATIENT
Start: 2023-02-22 | End: 2023-03-16

## 2023-02-22 RX ORDER — CYCLOBENZAPRINE HCL 5 MG
5 TABLET ORAL
Qty: 60 TABLET | Refills: 1 | Status: SHIPPED | OUTPATIENT
Start: 2023-02-22

## 2023-02-22 RX ORDER — LISINOPRIL 20 MG/1
20 TABLET ORAL DAILY
Qty: 90 TABLET | Refills: 3 | Status: SHIPPED | OUTPATIENT
Start: 2023-02-22 | End: 2024-03-18

## 2023-02-22 NOTE — PROGRESS NOTES
SUBJECTIVE:   CC: Hemant is an 53 year old who presents for preventative health visit.   Patient has been advised of split billing requirements and indicates understanding: Yes  Healthy Habits:     Getting at least 3 servings of Calcium per day:  Yes    Bi-annual eye exam:  Yes    Dental care twice a year:  Yes    Sleep apnea or symptoms of sleep apnea:  Sleep apnea    Diet:  Regular (no restrictions)    Frequency of exercise:  1 day/week    Duration of exercise:  15-30 minutes    Taking medications regularly:  Yes    Medication side effects:  None    PHQ-2 Total Score: 0    Additional concerns today:  Yes    Wt Readings from Last 4 Encounters:   02/22/23 90.7 kg (200 lb)   05/09/22 92.4 kg (203 lb 11.2 oz)   11/16/21 92.7 kg (204 lb 7 oz)   09/21/21 93.4 kg (205 lb 12.8 oz)       Today's PHQ-2 Score:   PHQ-2 ( 1999 Pfizer) 2/21/2023   Q1: Little interest or pleasure in doing things 0   Q2: Feeling down, depressed or hopeless 0   PHQ-2 Score 0   PHQ-2 Total Score (12-17 Years)- Positive if 3 or more points; Administer PHQ-A if positive -   Q1: Little interest or pleasure in doing things Not at all   Q2: Feeling down, depressed or hopeless Not at all   PHQ-2 Score 0           Social History     Tobacco Use     Smoking status: Never     Passive exposure: Never     Smokeless tobacco: Never   Substance Use Topics     Alcohol use: Yes     Comment: couple drinks a week     If you drink alcohol do you typically have >3 drinks per day or >7 drinks per week? No    No flowsheet data found.    Last PSA:   PSA   Date Value Ref Range Status   09/18/2020 0.71 0 - 4 ug/L Final     Comment:     Assay Method:  Chemiluminescence using Siemens Vista analyzer     Prostate Specific Antigen Screen   Date Value Ref Range Status   02/22/2023 1.03 0.00 - 3.50 ng/mL Final     PSA Tumor Marker   Date Value Ref Range Status   11/08/2021 0.82 0.00 - 3.50 ug/L Final       Reviewed orders with patient. Reviewed health maintenance and updated  "orders accordingly - Yes  Lab work is in process  Labs reviewed in EPIC    Reviewed and updated as needed this visit by clinical staff   Tobacco  Allergies  Meds  Problems  Med Hx  Surg Hx  Fam Hx          Reviewed and updated as needed this visit by Provider   Tobacco  Allergies  Meds  Problems  Med Hx  Surg Hx  Fam Hx             Review of Systems  CONSTITUTIONAL: NEGATIVE for fever, chills, change in weight  INTEGUMENTARY/SKIN: NEGATIVE for worrisome rashes, moles or lesions  EYES: NEGATIVE for vision changes or irritation  ENT: NEGATIVE for ear, mouth and throat problems  RESP: NEGATIVE for significant cough or SOB  CV: NEGATIVE for chest pain, palpitations or peripheral edema  GI: NEGATIVE for nausea, abdominal pain, heartburn, or change in bowel habits   male: negative for dysuria, hematuria, decreased urinary stream, erectile dysfunction, urethral discharge  MUSCULOSKELETAL: NEGATIVE for significant arthralgias or myalgia  NEURO: NEGATIVE for weakness, dizziness or paresthesias  PSYCHIATRIC: NEGATIVE for changes in mood or affect    OBJECTIVE:   /82   Pulse 59   Temp 98  F (36.7  C) (Oral)   Resp 16   Ht 1.715 m (5' 7.5\")   Wt 90.7 kg (200 lb)   SpO2 97%   BMI 30.86 kg/m      Physical Exam  GENERAL: healthy, alert and no distress  EYES: Eyes grossly normal to inspection, PERRL and conjunctivae and sclerae normal  HENT: ear canals and TM's normal, nose and mouth without ulcers or lesions  NECK: no adenopathy, no asymmetry, masses, or scars and thyroid normal to palpation  RESP: lungs clear to auscultation - no rales, rhonchi or wheezes  CV: regular rate and rhythm, normal S1 S2, no S3 or S4, no murmur, click or rub, no peripheral edema and peripheral pulses strong  ABDOMEN: mild epigastric tenderness without radiation. Mild tenderness to RUQ, no rebound. soft, no hepatosplenomegaly, no masses and bowel sounds normal  MS: no gross musculoskeletal defects noted, no edema. " Reproducible pain in right thoracic subscapular and paraspinous muscles. No flank tenderness.   SKIN: no suspicious lesions or rashes  NEURO: Normal strength and tone, mentation intact and speech normal  PSYCH: mentation appears normal, affect normal/bright    Diagnostic Test Results:  Labs reviewed in Epic  Results for orders placed or performed in visit on 02/22/23 (from the past 24 hour(s))   Comprehensive metabolic panel (BMP + Alb, Alk Phos, ALT, AST, Total. Bili, TP)   Result Value Ref Range    Sodium 142 136 - 145 mmol/L    Potassium 4.8 3.4 - 5.3 mmol/L    Chloride 104 98 - 107 mmol/L    Carbon Dioxide (CO2) 26 22 - 29 mmol/L    Anion Gap 12 7 - 15 mmol/L    Urea Nitrogen 16.9 6.0 - 20.0 mg/dL    Creatinine 0.91 0.67 - 1.17 mg/dL    Calcium 9.6 8.6 - 10.0 mg/dL    Glucose 102 (H) 70 - 99 mg/dL    Alkaline Phosphatase 84 40 - 129 U/L    AST 26 10 - 50 U/L    ALT 30 10 - 50 U/L    Protein Total 7.4 6.4 - 8.3 g/dL    Albumin 4.6 3.5 - 5.2 g/dL    Bilirubin Total 0.4 <=1.2 mg/dL    GFR Estimate >90 >60 mL/min/1.73m2   PSA, screen   Result Value Ref Range    Prostate Specific Antigen Screen 1.03 0.00 - 3.50 ng/mL    Narrative    This result is obtained using the Roche Elecsys total PSA method on the lilaim e801 immunoassay analyzer. Results obtained with different assay methods or kits cannot be used interchangeably.   UA Macro with Reflex to Micro and Culture - lab collect    Specimen: Urine, NOS   Result Value Ref Range    Color Urine Yellow Colorless, Straw, Light Yellow, Yellow    Appearance Urine Clear Clear    Glucose Urine Negative Negative mg/dL    Bilirubin Urine Negative Negative    Ketones Urine Negative Negative mg/dL    Specific Gravity Urine 1.025 1.005 - 1.030    Blood Urine Negative Negative    pH Urine 5.5 5.0 - 7.0    Protein Albumin Urine Negative Negative mg/dL    Urobilinogen Urine 0.2 0.2, 1.0 E.U./dL    Nitrite Urine Negative Negative    Leukocyte Esterase Urine Negative Negative     Narrative    Microscopic not indicated     Results for orders placed or performed in visit on 02/22/23   Comprehensive metabolic panel (BMP + Alb, Alk Phos, ALT, AST, Total. Bili, TP)     Status: Abnormal   Result Value Ref Range    Sodium 142 136 - 145 mmol/L    Potassium 4.8 3.4 - 5.3 mmol/L    Chloride 104 98 - 107 mmol/L    Carbon Dioxide (CO2) 26 22 - 29 mmol/L    Anion Gap 12 7 - 15 mmol/L    Urea Nitrogen 16.9 6.0 - 20.0 mg/dL    Creatinine 0.91 0.67 - 1.17 mg/dL    Calcium 9.6 8.6 - 10.0 mg/dL    Glucose 102 (H) 70 - 99 mg/dL    Alkaline Phosphatase 84 40 - 129 U/L    AST 26 10 - 50 U/L    ALT 30 10 - 50 U/L    Protein Total 7.4 6.4 - 8.3 g/dL    Albumin 4.6 3.5 - 5.2 g/dL    Bilirubin Total 0.4 <=1.2 mg/dL    GFR Estimate >90 >60 mL/min/1.73m2   PSA, screen     Status: Normal   Result Value Ref Range    Prostate Specific Antigen Screen 1.03 0.00 - 3.50 ng/mL    Narrative    This result is obtained using the Roche Elecsys total PSA method on the liliam e801 immunoassay analyzer. Results obtained with different assay methods or kits cannot be used interchangeably.   UA Macro with Reflex to Micro and Culture - lab collect     Status: Normal    Specimen: Urine, NOS   Result Value Ref Range    Color Urine Yellow Colorless, Straw, Light Yellow, Yellow    Appearance Urine Clear Clear    Glucose Urine Negative Negative mg/dL    Bilirubin Urine Negative Negative    Ketones Urine Negative Negative mg/dL    Specific Gravity Urine 1.025 1.005 - 1.030    Blood Urine Negative Negative    pH Urine 5.5 5.0 - 7.0    Protein Albumin Urine Negative Negative mg/dL    Urobilinogen Urine 0.2 0.2, 1.0 E.U./dL    Nitrite Urine Negative Negative    Leukocyte Esterase Urine Negative Negative    Narrative    Microscopic not indicated       ASSESSMENT/PLAN:   Gregory was seen today for physical.    Diagnoses and all orders for this visit:    Gregory was seen today for physical.    Diagnoses and all orders for this visit:      Routine  general medical examination at a health care facility  Screening for diabetes mellitus -     Comprehensive metabolic panel  Screening for prostate cancer -     PSA, screen    Hypertension goal BP (blood pressure) < 140/90  -     lisinopril (ZESTRIL) 20 MG tablet; Take 1 tablet (20 mg) by mouth daily    PATTI (obstructive sleep apnea)  Wears cpap    Bronchospasm  Bronchitis  Nearly 3 weeks of symptoms, persistent cough. This would warrant treatment, will trial zpak to cover atypical. Normal lung exam without wheezing. Will trial albuterol inhaler for bronchospasm, postviral bronchospasm.   -     azithromycin (ZITHROMAX) 250 MG tablet; Take 2 tablets (500 mg) by mouth daily for 1 day, THEN 1 tablet (250 mg) daily for 4 days.  -     albuterol (PROAIR HFA/PROVENTIL HFA/VENTOLIN HFA) 108 (90 Base) MCG/ACT inhaler; Inhale 2 puffs into the lungs every 6 hours as needed for shortness of breath, wheezing or cough    Muscle spasm  At low doses, ok to take with baclofen but try not to take together as they have very similar mechanisms. Can consider increasing baclofen at night instead.   -     cyclobenzaprine (FLEXERIL) 5 MG tablet; Take 1 tablet (5 mg) by mouth nightly as needed for muscle spasms      Chronic right-sided thoracic back pain  Chronic abd pain.   History of kidney stones  Unclear etiology. Localized symptoms would better direct imaging. Can consider chest/abd/pelv CT if any concerns. Differential includes gastritis/peptic ulcers (on treatment for esophageal spasms), pancreatitis because it radiates to his back (but currently not), gallbladder (not really consistent with that, pain is more epigastric but could explain right shoulder irritation, kidney stone (but UA without any blood).   -     UA Macro with Reflex to Micro and Culture - lab collect      Other orders  -     REVIEW OF HEALTH MAINTENANCE PROTOCOL ORDERS              COUNSELING:   Reviewed preventive health counseling, as reflected in patient  "instructions      BMI:   Estimated body mass index is 30.86 kg/m  as calculated from the following:    Height as of this encounter: 1.715 m (5' 7.5\").    Weight as of this encounter: 90.7 kg (200 lb).   Weight management plan: Discussed healthy diet and exercise guidelines      He reports that he has never smoked. He has never been exposed to tobacco smoke. He has never used smokeless tobacco.        Pastora Olivo MD  [facility]  "

## 2023-03-16 DIAGNOSIS — J98.01 BRONCHOSPASM: ICD-10-CM

## 2023-03-16 RX ORDER — ALBUTEROL SULFATE 90 UG/1
2 AEROSOL, METERED RESPIRATORY (INHALATION) EVERY 6 HOURS PRN
Qty: 18 G | Refills: 4 | Status: SHIPPED | OUTPATIENT
Start: 2023-03-16

## 2023-03-16 NOTE — TELEPHONE ENCOUNTER
"Last Written Prescription Date:  2/22/23  Last Fill Quantity: 18,  # refills: 0   Last office visit provider:  2/22/23     Requested Prescriptions   Pending Prescriptions Disp Refills     albuterol (PROAIR HFA/PROVENTIL HFA/VENTOLIN HFA) 108 (90 Base) MCG/ACT inhaler [Pharmacy Med Name: ALBUTEROL HFA (PROAIR) INHALER]       Sig: INHALE 2 PUFFS INTO THE LUNGS EVERY 6 HOURS AS NEEDED FOR SHORTNESS OF BREATH, WHEEZING OR COUGH       Asthma Maintenance Inhalers - Anticholinergics Passed - 3/16/2023 12:48 AM        Passed - Patient is age 12 years or older        Passed - Recent (12 mo) or future (30 days) visit within the authorizing provider's specialty     Patient has had an office visit with the authorizing provider or a provider within the authorizing providers department within the previous 12 mos or has a future within next 30 days. See \"Patient Info\" tab in inbasket, or \"Choose Columns\" in Meds & Orders section of the refill encounter.              Passed - Medication is active on med list       Short-Acting Beta Agonist Inhalers Protocol  Passed - 3/16/2023 12:48 AM        Passed - Patient is age 12 or older        Passed - Recent (12 mo) or future (30 days) visit within the authorizing provider's specialty     Patient has had an office visit with the authorizing provider or a provider within the authorizing providers department within the previous 12 mos or has a future within next 30 days. See \"Patient Info\" tab in inbasket, or \"Choose Columns\" in Meds & Orders section of the refill encounter.              Passed - Medication is active on med list             Sheila Loo RN 03/16/23 1:09 PM  "

## 2023-03-31 ENCOUNTER — TELEPHONE (OUTPATIENT)
Dept: FAMILY MEDICINE | Facility: CLINIC | Age: 53
End: 2023-03-31
Payer: COMMERCIAL

## 2023-03-31 DIAGNOSIS — Z87.442 HISTORY OF KIDNEY STONES: ICD-10-CM

## 2023-03-31 DIAGNOSIS — R10.9 FLANK PAIN: Primary | ICD-10-CM

## 2023-03-31 NOTE — TELEPHONE ENCOUNTER
Order/Referral Request    Who is requesting: Pt    Orders being requested: Ct scan     Reason service is needed/diagnosis: pain on torso    When are orders needed by: ASAP    Has this been discussed with Provider: Yes    Does patient have a preference on a Group/Provider/Facility? no    Does patient have an appointment scheduled?: No    Where to send orders: Fax    Could we send this information to you in [place] [place] or would you prefer to receive a phone call?:   Patient would prefer a phone call   Okay to leave a detailed message?: Yes at Home number on file [phone] (home)

## 2023-03-31 NOTE — TELEPHONE ENCOUNTER
Chart reviewed. Please review findings below.     Chronic right-sided thoracic back pain  Chronic abd pain.   History of kidney stones  Unclear etiology. Localized symptoms would better direct imaging. Can consider chest/abd/pelv CT if any concerns. Differential includes gastritis/peptic ulcers (on treatment for esophageal spasms), pancreatitis because it radiates to his back (but currently not), gallbladder (not really consistent with that, pain is more epigastric but could explain right shoulder irritation, kidney stone (but UA without any blood).   -     UA Macro with Reflex to Micro and Culture - lab collect

## 2023-04-05 NOTE — TELEPHONE ENCOUNTER
Patient Returning Call    Reason for call:  Request CT for pain from kidney stones/  Please read task encounter trail.      Information relayed to patient:  CT not ordered yet, will resent to PCP to order.     Patient has additional questions:  No      Could we send this information to you in [email or web address] or would you prefer to receive a phone call?:   Patient would prefer a phone call     Okay to leave a detailed message?: Yes at Home number on file [phone] (home)    Call taken on 4/5/23 at 410 pm by XAVI Bazan

## 2023-04-06 NOTE — TELEPHONE ENCOUNTER
Patient Returning Call    Reason for call:  Pt calling x 3rd time for a CT of kidney stones.  Pt thought PCP said to just call and CT would be ordered.      Information relayed to patient:  Will send high priority to PCP    Patient has additional questions:  No      Could we send this information to you in [employer] or would you prefer to receive a phone call?:   Patient would prefer a phone call     Okay to leave a detailed message?: Yes at Cell number on file:    Telephone Information:   Mobile [phone]       Call taken on 4/6/23 at 249 pm by XAVI Bazan

## 2023-04-07 NOTE — TELEPHONE ENCOUNTER
Patient notified that physician ordered CT abdomen w/o contrast to evaluated for kidney stones. The patient verbalizes understanding of provider/CSS instructions for follow-up and continued care per provider message.     Patient will call [facility] Scheduling at [phone]. Appointment for 04/13/2023 to discuss/request CT cancelled per patient request.

## 2023-04-14 ENCOUNTER — HOSPITAL ENCOUNTER (OUTPATIENT)
Dept: CT IMAGING | Facility: HOSPITAL | Age: 53
Discharge: HOME | End: 2023-04-14
Attending: FAMILY MEDICINE | Admitting: FAMILY MEDICINE
Payer: COMMERCIAL

## 2023-04-14 DIAGNOSIS — Z87.442 HISTORY OF KIDNEY STONES: ICD-10-CM

## 2023-04-14 DIAGNOSIS — R10.9 FLANK PAIN: ICD-10-CM

## 2023-04-14 PROCEDURE — 74176 CT ABD & PELVIS W/O CONTRAST: CPT

## 2023-07-04 DIAGNOSIS — Z76.0 ENCOUNTER FOR MEDICATION REFILL: ICD-10-CM

## 2023-07-04 DIAGNOSIS — R07.9 CHEST PAIN, UNSPECIFIED TYPE: ICD-10-CM

## 2023-07-04 NOTE — TELEPHONE ENCOUNTER
"Routing refill request to provider for review/approval because:  Labs not current:  LDL    Last Written Prescription Date:  1/12/23  Last Fill Quantity: 90,  # refills: 1   Last office visit provider:  2/22/23     Requested Prescriptions   Pending Prescriptions Disp Refills     atorvastatin (LIPITOR) 20 MG tablet [Pharmacy Med Name: ATORVASTATIN 20 MG TABLET] 90 tablet 1     Sig: TAKE 1 TABLET (20 MG) BY MOUTH DAILY PLEASE HAVE PRIMARY CARE PHYSICIAN FILL IN THE FUTURE       Statins Protocol Failed - 7/4/2023 12:48 PM        Failed - LDL on file in past 12 months     Recent Labs   Lab Test 05/09/22  1608   LDL 77             Passed - No abnormal creatine kinase in past 12 months     No lab results found.             Passed - Recent (12 mo) or future (30 days) visit within the authorizing provider's specialty     Patient has had an office visit with the authorizing provider or a provider within the authorizing providers department within the previous 12 mos or has a future within next 30 days. See \"Patient Info\" tab in inbasket, or \"Choose Columns\" in Meds & Orders section of the refill encounter.              Passed - Medication is active on med list        Passed - Patient is age 18 or older             Austin Pisano RN 07/04/23 12:48 PM  "

## 2023-07-05 ENCOUNTER — VIRTUAL VISIT (OUTPATIENT)
Dept: GASTROENTEROLOGY | Facility: CLINIC | Age: 53
End: 2023-07-05
Payer: COMMERCIAL

## 2023-07-05 DIAGNOSIS — K21.9 GASTROESOPHAGEAL REFLUX DISEASE WITHOUT ESOPHAGITIS: ICD-10-CM

## 2023-07-05 PROCEDURE — 99214 OFFICE O/P EST MOD 30 MIN: CPT | Mod: VID | Performed by: INTERNAL MEDICINE

## 2023-07-05 RX ORDER — BACLOFEN 10 MG/1
5 TABLET ORAL 2 TIMES DAILY
Qty: 90 TABLET | Refills: 1 | Status: SHIPPED | OUTPATIENT
Start: 2023-07-05 | End: 2023-11-06

## 2023-07-05 RX ORDER — ATORVASTATIN CALCIUM 20 MG/1
20 TABLET, FILM COATED ORAL DAILY
Qty: 90 TABLET | Refills: 0 | Status: SHIPPED | OUTPATIENT
Start: 2023-07-05 | End: 2023-09-30

## 2023-07-05 ASSESSMENT — PAIN SCALES - GENERAL: PAINLEVEL: MODERATE PAIN (5)

## 2023-07-05 NOTE — NURSING NOTE
Is the patient currently in the state of [place]? YES    Visit mode:VIDEO    If the visit is dropped, the patient can be reconnected by: VIDEO VISIT: Text to cell phone: [phone]    Will anyone else be joining the visit? NO      How would you like to obtain your AVS? MyChart    Are changes needed to the allergy or medication list? NO    Reason for visit: No chief complaint on file.

## 2023-07-05 NOTE — PROGRESS NOTES
HPI:    Hemant presents today for a video visit for follow-up.  Symptoms are pretty stable - comes and goes - has been trying to taper off the omeprazole - does add famotidine as needed.  Tries to go 1-2 days without taking it.  Remains on the baclofen - taking twice daily.    Has had recurring right sided pain - comes and goes.  Did have a CT scan for this which was unrevealing aside from possible possible cystitis. Pain comes and goes randomly - worse with movement and certain positions. One time did seem to get worse when drinking with a latte but pain was already present when he drank it.  No associated GI symptoms (nausea, vomiting, changes in bowels, etc).  Eating and bowel movements are normal during these episodes.      Past Medical History:   Diagnosis Date     Adhesive capsulitis of left shoulder 7/1/2020     Allergic rhinitis, cause unspecified     Allergic rhinitis     Essential hypertension, benign      History of blood transfusion      Other chronic allergic conjunctivitis     Allergic conjunctivitis     Tubular adenoma x3 09/20/2019    Surveillance colonoscopy recommended 9/2022       Past Surgical History:   Procedure Laterality Date     CLOSED REDUCTION, PERCUTANEOUS PINNING FINGER, COMBINED  5/25/2012    Procedure:COMBINED CLOSED REDUCTION, PERCUTANEOUS PINNING FINGER; Closed reductionand fixation by pinning of Right Thumb fracture       COLONOSCOPY N/A 8/25/2014    Procedure: COMBINED COLONOSCOPY, SINGLE BIOPSY/POLYPECTOMY BY BIOPSY;  Surgeon: Misael Jarvis MD;  Location:  GI     COLONOSCOPY N/A 9/20/2019    Procedure: COLONOSCOPY, WITH POLYPECTOMY AND BIOPSY;  Surgeon: Fred Edge MD;  Location:  OR     DENTAL SURGERY       ESOPHAGOSCOPY, GASTROSCOPY, DUODENOSCOPY (EGD), COMBINED  6/17/2013    Procedure: COMBINED ESOPHAGOSCOPY, GASTROSCOPY, DUODENOSCOPY (EGD), BIOPSY SINGLE OR MULTIPLE;;  Surgeon: Benjy Marinelli MD;  Location:  GI     ESOPHAGOSCOPY,  GASTROSCOPY, DUODENOSCOPY (EGD), COMBINED N/A 8/25/2014    Procedure: COMBINED ESOPHAGOSCOPY, GASTROSCOPY, DUODENOSCOPY (EGD), BIOPSY SINGLE OR MULTIPLE;  Surgeon: Misael Jarvis MD;  Location:  GI     LASIK BILATERAL       thumb surgery       wisdom teeth removed  8-2014       Family History   Problem Relation Age of Onset     Breast Cancer Mother      Arthritis Father      Genitourinary Problems Father         stones     Cancer Father         skin     C.A.D. Father         stents/age late 60s     Coronary Artery Disease Father         Stents     Prostate Cancer Father         late 50s     Snoring Father      Asthma Sister      Depression Sister      Cancer Paternal Grandfather         ? type     Eye Disorder Paternal Grandfather         cateracts       Social History     Tobacco Use     Smoking status: Never     Passive exposure: Never     Smokeless tobacco: Never   Substance Use Topics     Alcohol use: Yes     Comment: couple drinks a week        O:    Gen: no acute distress  HEENT: NCAT  Neck: normal ROM  Resp: nonlabored breathing  Neuro: no gross deficits  Psych: appropriate mood and affect    Assessment and Plan:    # GERD, atypical chest pain - symptoms stable - continues to do quite well on PPI and baclofen.  Has been trying to taper off PPI which is reasonable - can continue to supplement with famotidine as needed    # right sided abdominal pain - work-up in the past included ultrasound which was normal - most recently had CT scan which was unrevealing.  Does seem to be MSK component as is positional in nature and not associated with any other GI symptoms - reassurance provided - can trial topical therapies    # history of colon polyps - repeat c-scope 2024    RTC 1 year or sooner if needed    Alexa Cordero DO     Video-Visit Details     Type of service:  Video Visit     Video Start Time: 9:30 AM  Video End Time (time video stopped):     Originating Location (pt. Location): home     Distant  Location (provider location):  remote     Mode of Communication:  Video Conference via [employer]

## 2023-07-05 NOTE — PATIENT INSTRUCTIONS
You can try topical therapies for your right sided back/abdominal pain - try things like hot packs, bengay, voltaren gel, etc    Continue to try to taper off the nexium - you can add in famotidine up to twice daily as needed for breakthrough    You can try tapering off the baclofen if you're interested - going at first to once a day and then every other day - if you notice more esophageal spasms with this I would recommend resuming it at your current dose

## 2023-07-19 NOTE — TELEPHONE ENCOUNTER
[facility] Call Center    Phone Message    May a detailed message be left on voicemail: yes     Reason for Call: Other: Pt wanting to be schedule for his referral for his left shoulder pain and possible cortizone injection. Please follow up when availabe. Thank you      Action Taken: Message routed to:  [facility] Sports Med    Travel Screening: Not Applicable                                                                        
Returned call to patient, informing him that due to the current state of COVID-19, we are unable to offer him a face-to-face visit and we are not performing any procedures including injections. He was offered a video visit to still touch base with Dr. Cox or he could wait for a face-to-face visit at a later date, which would be subject to change. He opted for the in-person appointment at the end of May. He understands that our office will call him if we need to change the appointment for any reason. He had no further questions at this time.   
162.56

## 2023-08-22 ENCOUNTER — PATIENT OUTREACH (OUTPATIENT)
Dept: GASTROENTEROLOGY | Facility: CLINIC | Age: 53
End: 2023-08-22
Payer: COMMERCIAL

## 2023-08-22 DIAGNOSIS — Z12.11 SPECIAL SCREENING FOR MALIGNANT NEOPLASMS, COLON: Primary | ICD-10-CM

## 2023-08-22 NOTE — TELEPHONE ENCOUNTER
"Ordering/Referring Provider: Pastora Olivo  BMI: Estimated body mass index is 30.86 kg/m  as calculated from the following:    Height as of 2/22/23: 1.715 m (5' 7.5\").    Weight as of 2/22/23: 90.7 kg (200 lb).     Sedation:  Based on patient's medical history patient is appropriate for   moderate sedation. If patient's BMI > 50 do not schedule in ASC.    Location:  Does patient have an LVAD?  No    Does patient have a history of moderate to severe sleep apnea?  Yes (Hospital Only)    Does patient have a history of asthma, COPD or any other lung disease?  No    Does patient have a history of cardiac disease?  No    Is patient awaiting a heart or lung transplant?   No    Has patient had a stroke or transient ischemic attack in the last 6 months?   No    Is the patient currently on dialysis?   No    Prep:  Previous prep (last colonoscopy):   Unknown     Quality of previous prep:   Good    Is patient currently on dialysis, is ESRD, or CKD stage 4/5?   No (standard prep)    Does patient have a diagnosis of diabetes?  No    Does patient have a diagnosis of cystic fibrosis (CF)?  No    BMI > 40?  No    Final Referral Status:  meets the criteria for placement of colonoscopy screening  referral order.      Referral order placed with the following recommendations:  Sedation: Moderate Sedation  Location Type: Operating Room  Prep: MiraLAX (No Mag Citrate)    Adina Gonzalez RN on 8/22/2023 at 1:39 PM    "

## 2023-09-05 ENCOUNTER — TELEPHONE (OUTPATIENT)
Dept: GASTROENTEROLOGY | Facility: CLINIC | Age: 53
End: 2023-09-05
Payer: COMMERCIAL

## 2023-09-05 NOTE — TELEPHONE ENCOUNTER
"Endoscopy Scheduling Screen    Have you had a positive Covid test in the last 14 days?  No      Are you active on MyChart?   Yes      What insurance is in the chart?  Other:  cigna/hp      Ordering/Referring Provider: MARYSOL CORONA   (If ordering provider performs procedure, schedule with ordering provider unless otherwise instructed. )    BMI: Estimated body mass index is 30.86 kg/m  as calculated from the following:    Height as of 2/22/23: 1.715 m (5' 7.5\").    Weight as of 2/22/23: 90.7 kg (200 lb).     Sedation   Ordered  moderate sedation.   If patient BMI > 50 do not schedule in ASC.    If patient BMI > 45 do not schedule at [facility].    Are you taking methadone or Suboxone?  No      Are you taking any prescription medications for pain 3 or more times per week?   No      Do you have a history of malignant hyperthermia or adverse reaction to anesthesia?  No      (Females) Are you currently pregnant?   No       Have you been diagnosed or told you have pulmonary hypertension?   No      Do you have an LVAD?  No      Have you been told you have moderate to severe sleep apnea?  Yes (RN Review required for scheduling unless scheduling in Hospital.)  Uses cpap    Have you been told you have COPD, asthma, or any other lung disease?  No      Do you have any heart conditions?  No       Have you ever had an organ transplant?   No      Have you ever had or are you awaiting a heart or lung transplant?   No    Have you had a stroke or transient ischemic attack (TIA aka \"mini stroke\" in the last 6 months?   No      Have you been diagnosed with or been told you have cirrhosis of the liver?   No      Are you currently on dialysis?   No      Do you need assistance transferring?   No    BMI: Estimated body mass index is 30.86 kg/m  as calculated from the following:    Height as of 2/22/23: 1.715 m (5' 7.5\").    Weight as of 2/22/23: 90.7 kg (200 lb).       Is patients BMI > 40 and scheduling location [facility]?  No      Do you take an " injectable medication for weight loss or diabetes (excluding insulin)?  No      Do you take the medication Naltrexone?  No      Do you take blood thinners?  No       Prep   Are you currently on dialysis or do you have chronic kidney disease?  No      Do you have a diagnosis of diabetes?  No      Do you have a diagnosis of cystic fibrosis (CF)?  No      On a regular basis do you go 3 -5 days between bowel movements?  No      BMI > 40?  No      Preferred Pharmacy:    [id number] [place] [address] [address]  Phone: [phone] Fax: [phone]      Final Scheduling Details   Colonoscopy prep sent?  Standard MiraLAX      Procedure scheduled  Colonoscopy      Surgeon:  MURALI     Date of procedure:  12/4     Pre-OP / PAC:   No - Not required for this site.    Location  [facility] - Per order. [facility]    Sedation   Moderate Sedation - Per order.      Patient Reminders:   You will receive a call from a Nurse to review instructions and health history.  This assessment must be completed prior to your procedure.  Failure to complete the Nurse assessment may result in the procedure being cancelled.      On the day of your procedure, please designate an adult(s) who can drive you home stay with you for the next 24 hours. The medicines used in the exam will make you sleepy. You will not be able to drive.      You cannot take public transportation, ride share services, or non-medical taxi service without a responsible caregiver.  Medical transport services are allowed with the requirement that a responsible caregiver will receive you at your destination.  We require that drivers and caregivers are confirmed prior to your procedure.

## 2023-09-29 DIAGNOSIS — R07.9 CHEST PAIN, UNSPECIFIED TYPE: ICD-10-CM

## 2023-09-29 DIAGNOSIS — Z76.0 ENCOUNTER FOR MEDICATION REFILL: ICD-10-CM

## 2023-09-30 RX ORDER — ATORVASTATIN CALCIUM 20 MG/1
20 TABLET, FILM COATED ORAL DAILY
Qty: 90 TABLET | Refills: 0 | Status: SHIPPED | OUTPATIENT
Start: 2023-09-30 | End: 2023-12-26

## 2023-10-27 ENCOUNTER — IMMUNIZATION (OUTPATIENT)
Dept: FAMILY MEDICINE | Facility: CLINIC | Age: 53
End: 2023-10-27
Payer: COMMERCIAL

## 2023-10-27 PROCEDURE — 90471 IMMUNIZATION ADMIN: CPT

## 2023-10-27 PROCEDURE — 90682 RIV4 VACC RECOMBINANT DNA IM: CPT

## 2023-10-27 PROCEDURE — 91320 SARSCV2 VAC 30MCG TRS-SUC IM: CPT

## 2023-10-27 PROCEDURE — 90480 ADMN SARSCOV2 VAC 1/ONLY CMP: CPT

## 2023-11-04 DIAGNOSIS — K21.9 GASTROESOPHAGEAL REFLUX DISEASE WITHOUT ESOPHAGITIS: ICD-10-CM

## 2023-11-06 RX ORDER — BACLOFEN 10 MG/1
5 TABLET ORAL 2 TIMES DAILY
Qty: 90 TABLET | Refills: 1 | Status: SHIPPED | OUTPATIENT
Start: 2023-11-06 | End: 2024-06-24

## 2023-11-06 NOTE — TELEPHONE ENCOUNTER
Baclofen (Lioresal) 10mg tablet.  Take 0.5 tablets (5mg) by mouth 2 times daily.    Last Written Prescription Date:  7/5/23  Last Fill Quantity: 90,  # refills: 1   Last office visit: Visit date not found ; last virtual visit: 7/5/2023 with prescribing provider:     Future Office Visit:  7/5/25    Routing refill request to provider for review/approval because:  Drug not on the [facility] refill protocol      Darline Cevallos RN

## 2023-11-17 ENCOUNTER — TELEPHONE (OUTPATIENT)
Dept: GASTROENTEROLOGY | Facility: CLINIC | Age: 53
End: 2023-11-17
Payer: COMMERCIAL

## 2023-11-17 NOTE — TELEPHONE ENCOUNTER
Pre visit planning completed.      Procedure details:    Patient scheduled for Colonoscopy  on 12/4/2023.     Arrival time: 0900. Procedure time 1000    Pre op exam needed? N/A    Facility location: [facility]; [address] [place] [address], [address] Floor, [place] [address]    Sedation type: Conscious sedation     Indication for procedure: History of Polyps       Chart review:     Electronic implanted devices? No    Recent diagnosis of diverticulitis within the last 6 weeks? No    Diabetic? No    Diabetic medication HOLDING recommendations: (if applicable)  Oral diabetic medications: N/A  Diabetic injectables: N/A  Insulin: N/A      Medication review:    Anticoagulants? No    NSAIDS? Yes.  Ibuprofen (Advil, Motrin).  Holding interval of 1 day before procedure.    Other medication HOLDING recommendations:  N/A      Prep for procedure:     Bowel prep recommendation: Standard Miralax   Due to:  standard bowel prep.    Prep instructions sent via [employer]       Brenna Cr RN  Endoscopy Procedure Pre Assessment RN  [phone] option 4

## 2023-11-17 NOTE — TELEPHONE ENCOUNTER
Attempted to contact patient in order to complete pre assessment questions.     No answer. Left message to return call to [phone] option 4    Brenna Cr RN  Endoscopy Procedure Pre Assessment RN

## 2023-11-20 NOTE — TELEPHONE ENCOUNTER
Pre assessment completed for upcoming procedure.   (Please see previous telephone encounter notes for complete details)    Patient  returned call.       Procedure details:    Arrival time and facility location reviewed.    Pre op exam needed? N/A    Designated  policy reviewed. Instructed to have someone stay 6 hours post procedure.     COVID policy reviewed.      Medication review:    Medications reviewed. Please see supporting documentation below. Holding recommendations discussed (if applicable).       Prep for procedure:     Procedure prep instructions reviewed.        Additional information needed?  N/A      Patient  verbalized understanding and had no questions or concerns at this time.      Aileen Elizalde RN  Endoscopy Procedure Pre Assessment RN  [phone] option 4

## 2023-12-04 ENCOUNTER — HOSPITAL ENCOUNTER (OUTPATIENT)
Facility: CLINIC | Age: 53
Discharge: HOME | End: 2023-12-04
Attending: INTERNAL MEDICINE | Admitting: INTERNAL MEDICINE
Payer: COMMERCIAL

## 2023-12-04 ENCOUNTER — ANESTHESIA (OUTPATIENT)
Dept: GASTROENTEROLOGY | Facility: CLINIC | Age: 53
End: 2023-12-04
Payer: COMMERCIAL

## 2023-12-04 ENCOUNTER — ANESTHESIA EVENT (OUTPATIENT)
Dept: GASTROENTEROLOGY | Facility: CLINIC | Age: 53
End: 2023-12-04
Payer: COMMERCIAL

## 2023-12-04 VITALS
RESPIRATION RATE: 16 BRPM | BODY MASS INDEX: 28.64 KG/M2 | WEIGHT: 189 LBS | OXYGEN SATURATION: 98 % | TEMPERATURE: 98.2 F | HEIGHT: 68 IN | DIASTOLIC BLOOD PRESSURE: 81 MMHG | SYSTOLIC BLOOD PRESSURE: 107 MMHG | HEART RATE: 81 BPM

## 2023-12-04 LAB — COLONOSCOPY: NORMAL

## 2023-12-04 PROCEDURE — 250N000011 HC RX IP 250 OP 636: Performed by: NURSE ANESTHETIST, CERTIFIED REGISTERED

## 2023-12-04 PROCEDURE — 45385 COLONOSCOPY W/LESION REMOVAL: CPT | Mod: PT | Performed by: INTERNAL MEDICINE

## 2023-12-04 PROCEDURE — 88305 TISSUE EXAM BY PATHOLOGIST: CPT | Mod: 26 | Performed by: PATHOLOGY

## 2023-12-04 PROCEDURE — 370N000017 HC ANESTHESIA TECHNICAL FEE, PER MIN: Performed by: INTERNAL MEDICINE

## 2023-12-04 PROCEDURE — 88305 TISSUE EXAM BY PATHOLOGIST: CPT | Mod: TC | Performed by: INTERNAL MEDICINE

## 2023-12-04 PROCEDURE — 45380 COLONOSCOPY AND BIOPSY: CPT | Mod: 59 | Performed by: STUDENT IN AN ORGANIZED HEALTH CARE EDUCATION/TRAINING PROGRAM

## 2023-12-04 PROCEDURE — 258N000003 HC RX IP 258 OP 636: Performed by: NURSE ANESTHETIST, CERTIFIED REGISTERED

## 2023-12-04 RX ORDER — SODIUM CHLORIDE, SODIUM LACTATE, POTASSIUM CHLORIDE, CALCIUM CHLORIDE 600; 310; 30; 20 MG/100ML; MG/100ML; MG/100ML; MG/100ML
INJECTION, SOLUTION INTRAVENOUS CONTINUOUS
Status: DISCONTINUED | OUTPATIENT
Start: 2023-12-04 | End: 2023-12-04 | Stop reason: HOSPADM

## 2023-12-04 RX ORDER — SODIUM CHLORIDE, SODIUM LACTATE, POTASSIUM CHLORIDE, CALCIUM CHLORIDE 600; 310; 30; 20 MG/100ML; MG/100ML; MG/100ML; MG/100ML
INJECTION, SOLUTION INTRAVENOUS CONTINUOUS PRN
Status: DISCONTINUED | OUTPATIENT
Start: 2023-12-04 | End: 2023-12-04

## 2023-12-04 RX ORDER — SODIUM CHLORIDE, SODIUM LACTATE, POTASSIUM CHLORIDE, CALCIUM CHLORIDE 600; 310; 30; 20 MG/100ML; MG/100ML; MG/100ML; MG/100ML
INJECTION, SOLUTION INTRAVENOUS CONTINUOUS
Status: CANCELLED | OUTPATIENT
Start: 2023-12-04

## 2023-12-04 RX ORDER — OXYCODONE HYDROCHLORIDE 5 MG/1
5 TABLET ORAL
Status: CANCELLED | OUTPATIENT
Start: 2023-12-04

## 2023-12-04 RX ORDER — PROPOFOL 10 MG/ML
INJECTION, EMULSION INTRAVENOUS PRN
Status: DISCONTINUED | OUTPATIENT
Start: 2023-12-04 | End: 2023-12-04

## 2023-12-04 RX ORDER — ONDANSETRON 2 MG/ML
4 INJECTION INTRAMUSCULAR; INTRAVENOUS EVERY 30 MIN PRN
Status: CANCELLED | OUTPATIENT
Start: 2023-12-04

## 2023-12-04 RX ORDER — PROPOFOL 10 MG/ML
INJECTION, EMULSION INTRAVENOUS CONTINUOUS PRN
Status: DISCONTINUED | OUTPATIENT
Start: 2023-12-04 | End: 2023-12-04

## 2023-12-04 RX ORDER — OXYCODONE HYDROCHLORIDE 10 MG/1
10 TABLET ORAL
Status: CANCELLED | OUTPATIENT
Start: 2023-12-04

## 2023-12-04 RX ORDER — ONDANSETRON 4 MG/1
4 TABLET, ORALLY DISINTEGRATING ORAL EVERY 30 MIN PRN
Status: CANCELLED | OUTPATIENT
Start: 2023-12-04

## 2023-12-04 RX ADMIN — PROPOFOL 150 MCG/KG/MIN: 10 INJECTION, EMULSION INTRAVENOUS at 10:49

## 2023-12-04 RX ADMIN — PROPOFOL 40 MG: 10 INJECTION, EMULSION INTRAVENOUS at 10:52

## 2023-12-04 RX ADMIN — SODIUM CHLORIDE, POTASSIUM CHLORIDE, SODIUM LACTATE AND CALCIUM CHLORIDE: 600; 310; 30; 20 INJECTION, SOLUTION INTRAVENOUS at 10:48

## 2023-12-04 ASSESSMENT — ACTIVITIES OF DAILY LIVING (ADL)
ADLS_ACUITY_SCORE: 35
ADLS_ACUITY_SCORE: 35

## 2023-12-04 ASSESSMENT — ENCOUNTER SYMPTOMS: SEIZURES: 0

## 2023-12-04 NOTE — OR NURSING
Patient had colonoscopy with polypectomies x6 (1 not retrieved) Patient tolerated procedure under MAC

## 2023-12-04 NOTE — H&P
ENDOSCOPY PRE-SEDATION H&P FOR OUTPATIENT PROCEDURES    Gregory Aguilera  [id number]  1970    Procedure: Colonoscopy    Pre-procedure diagnosis: Hx polyps    Past medical history:   Past Medical History:   Diagnosis Date    Adhesive capsulitis of left shoulder 7/1/2020    Allergic rhinitis, cause unspecified     Allergic rhinitis    Essential hypertension, benign     History of blood transfusion     Other chronic allergic conjunctivitis     Allergic conjunctivitis    Tubular adenoma x3 09/20/2019    Surveillance colonoscopy recommended 9/2022     Patient Active Problem List   Diagnosis    Allergic rhinitis    Other chronic allergic conjunctivitis    Hypertension goal BP (blood pressure) < 140/90    Hyperlipidemia LDL goal <130    Chronic abdominal pain    Testis pain    Overweight (BMI 25.0-29.9)    PATTI (obstructive sleep apnea)    Hip pain, right    Eosinophilic esophagitis    Adhesive capsulitis of right shoulder       Past surgical history:   Past Surgical History:   Procedure Laterality Date    CLOSED REDUCTION, PERCUTANEOUS PINNING FINGER, COMBINED  5/25/2012    Procedure:COMBINED CLOSED REDUCTION, PERCUTANEOUS PINNING FINGER; Closed reductionand fixation by pinning of Right Thumb fracture      COLONOSCOPY N/A 8/25/2014    Procedure: COMBINED COLONOSCOPY, SINGLE BIOPSY/POLYPECTOMY BY BIOPSY;  Surgeon: Misael Jarvis MD;  [facility]    COLONOSCOPY N/A 9/20/2019    Procedure: COLONOSCOPY, WITH POLYPECTOMY AND BIOPSY;  Surgeon: Fred Edge MD;  [facility]    DENTAL SURGERY      ESOPHAGOSCOPY, GASTROSCOPY, DUODENOSCOPY (EGD), COMBINED  6/17/2013    Procedure: COMBINED ESOPHAGOSCOPY, GASTROSCOPY, DUODENOSCOPY (EGD), BIOPSY SINGLE OR MULTIPLE;;  Surgeon: Benjy Marinelli MD;  Location: [facility]    ESOPHAGOSCOPY, GASTROSCOPY, DUODENOSCOPY (EGD), COMBINED N/A 8/25/2014    Procedure: COMBINED ESOPHAGOSCOPY, GASTROSCOPY, DUODENOSCOPY (EGD), BIOPSY SINGLE OR MULTIPLE;  Surgeon: Hernandez  "Misael Kraus MD;  Location:  GI    LASIK BILATERAL      thumb surgery      wisdom teeth removed  8-2014       No current facility-administered medications for this encounter.       Allergies   Allergen Reactions    Dust Mites     Grass     Mold     No Known Drug Allergy     Trees        History of Anesthesia/Sedation Problems: no    Physical Exam:    Mental status: alert  Heart: Normal  Lung: Normal  Assessment of patient's airway: Normal  Other as pertinent for procedure: None     Lab Results   Component Value Date    WBC 5.7 05/09/2022    WBC 4.9 02/01/2020     Lab Results   Component Value Date    RBC 5.23 05/09/2022    RBC 5.42 02/01/2020     Lab Results   Component Value Date    HGB 15.4 05/09/2022    HGB 16.4 02/01/2020     Lab Results   Component Value Date    HCT 45.0 05/09/2022    HCT 47.5 02/01/2020     Lab Results   Component Value Date    MCV 86 05/09/2022    MCV 88 02/01/2020     Lab Results   Component Value Date    MCH 29.4 05/09/2022    MCH 30.3 02/01/2020     Lab Results   Component Value Date    MCHC 34.2 05/09/2022    MCHC 34.5 02/01/2020     Lab Results   Component Value Date    RDW 12.2 05/09/2022    RDW 12.1 02/01/2020     Lab Results   Component Value Date     05/09/2022     02/01/2020     No results found for: \"INR\"     ASA Score: See Provation note    Mallampati score:  II - Faucial pillars and soft palate may be seen, but uvula is masked by the base of the tongue    Assessment/Plan:     The patient is an appropriate candidate to receive sedation.    Informed consent was discussed with the patient/family, including the risks, benefits, potential complications and any alternative options associated with sedation.    Patient assessment completed just prior to sedation and while under constant observation by the provider. Condition determined to be adequate for proceeding with sedation.    The specific risks for the procedure were discussed with the patient at the time of " informed consent and include but are not limited to perforation which could require surgery, missing significant neoplasm or lesion, hemorrhage and adverse sedative complication.      Aj Ames MD

## 2023-12-04 NOTE — ANESTHESIA PREPROCEDURE EVALUATION
Anesthesia Pre-Procedure Evaluation    Patient: Gregory Aguilera   MRN: [id number] : 1970        Procedure : Procedure(s):  Colonoscopy          Past Medical History:   Diagnosis Date    Adhesive capsulitis of left shoulder 2020    Allergic rhinitis, cause unspecified     Allergic rhinitis    Essential hypertension, benign     History of blood transfusion     Other chronic allergic conjunctivitis     Allergic conjunctivitis    Tubular adenoma x3 2019    Surveillance colonoscopy recommended 2022      Past Surgical History:   Procedure Laterality Date    CLOSED REDUCTION, PERCUTANEOUS PINNING FINGER, COMBINED  2012    Procedure:COMBINED CLOSED REDUCTION, PERCUTANEOUS PINNING FINGER; Closed reductionand fixation by pinning of Right Thumb fracture      COLONOSCOPY N/A 2014    Procedure: COMBINED COLONOSCOPY, SINGLE BIOPSY/POLYPECTOMY BY BIOPSY;  Surgeon: Misael Jarvis MD;  Location: [facility]    COLONOSCOPY N/A 2019    Procedure: COLONOSCOPY, WITH POLYPECTOMY AND BIOPSY;  Surgeon: Fred Edge MD;  Location:  OR    DENTAL SURGERY      ESOPHAGOSCOPY, GASTROSCOPY, DUODENOSCOPY (EGD), COMBINED  2013    Procedure: COMBINED ESOPHAGOSCOPY, GASTROSCOPY, DUODENOSCOPY (EGD), BIOPSY SINGLE OR MULTIPLE;;  Surgeon: Benjy Marinelli MD;  Location: [facility]    ESOPHAGOSCOPY, GASTROSCOPY, DUODENOSCOPY (EGD), COMBINED N/A 2014    Procedure: COMBINED ESOPHAGOSCOPY, GASTROSCOPY, DUODENOSCOPY (EGD), BIOPSY SINGLE OR MULTIPLE;  Surgeon: Misael Jarvis MD;  Location: [facility]    LASIK BILATERAL      thumb surgery      wisdom teeth removed        Allergies   Allergen Reactions    Dust Mites     Grass     Mold     No Known Drug Allergy     Trees       Social History     Tobacco Use    Smoking status: Never     Passive exposure: Never    Smokeless tobacco: Never   Substance Use Topics    Alcohol use: Yes     Comment: couple drinks a week      Wt Readings from Last 1  "Encounters:   12/04/23 85.7 kg (189 lb)        Anesthesia Evaluation   Pt has had prior anesthetic.     No history of anesthetic complications       ROS/MED HX  ENT/Pulmonary:     (+) sleep apnea, uses CPAP,                                     Neurologic:    (-) no seizures and no CVA   Cardiovascular:     (+)  hypertension- -   -  - -                                   (-) CAD and stent   METS/Exercise Tolerance:     Hematologic:       Musculoskeletal:       GI/Hepatic:     (+) GERD, Asymptomatic on medication,                  Renal/Genitourinary:  - neg Renal ROS     Endo:       Psychiatric/Substance Use:  - neg psychiatric ROS     Infectious Disease:       Malignancy:  - neg malignancy ROS     Other:            Physical Exam    Airway        Mallampati: II   TM distance: > 3 FB   Neck ROM: full   Mouth opening: > 3 cm    Respiratory Devices and Support         Dental       (+) Minor Abnormalities - some fillings, tiny chips      Cardiovascular          Rhythm and rate: normal     Pulmonary   pulmonary exam normal              BP (!) 100/93   Pulse 86   Temp 36.8  C (98.2  F) (Oral)   Resp 14   Ht 1.715 m (5' 7.5\")   Wt 85.7 kg (189 lb)   SpO2 100%   BMI 29.16 kg/m      OUTSIDE LABS:  CBC:   Lab Results   Component Value Date    WBC 5.7 05/09/2022    WBC 4.9 02/01/2020    HGB 15.4 05/09/2022    HGB 16.4 02/01/2020    HCT 45.0 05/09/2022    HCT 47.5 02/01/2020     05/09/2022     (L) 02/01/2020     BMP:   Lab Results   Component Value Date     02/22/2023     05/09/2022    POTASSIUM 4.8 02/22/2023    POTASSIUM 3.8 05/09/2022    CHLORIDE 104 02/22/2023    CHLORIDE 103 05/09/2022    CO2 26 02/22/2023    CO2 26 05/09/2022    BUN 16.9 02/22/2023    BUN 13 05/09/2022    CR 0.91 02/22/2023    CR 0.88 05/09/2022     (H) 02/22/2023    GLC 96 05/09/2022     COAGS: No results found for: \"PTT\", \"INR\", \"FIBR\"  POC: No results found for: \"BGM\", \"HCG\", \"HCGS\"  HEPATIC:   Lab Results " "  Component Value Date    ALBUMIN 4.6 02/22/2023    PROTTOTAL 7.4 02/22/2023    ALT 30 02/22/2023    AST 26 02/22/2023    ALKPHOS 84 02/22/2023    BILITOTAL 0.4 02/22/2023     OTHER:   Lab Results   Component Value Date    PAPA 9.6 02/22/2023    LIPASE 45 02/22/2013    TSH 1.10 05/09/2022    CRP 0.2 05/09/2022    SED 2 05/09/2022       Anesthesia Plan    ASA Status:  2       Anesthesia Type: MAC.     - Reason for MAC: straight local not clinically adequate   Induction: Intravenous, Propofol.           Consents    Anesthesia Plan(s) and associated risks, benefits, and realistic alternatives discussed. Questions answered and patient/representative(s) expressed understanding.     - Discussed:     - Discussed with:  Patient      - Extended Intubation/Ventilatory Support Discussed: No.      - Patient is DNR/DNI Status: No     Use of blood products discussed: No .     Postoperative Care            Comments:               Farhan Mao MD    I have reviewed the pertinent notes and labs in the chart from the past 30 days and (re)examined the patient.  Any updates or changes from those notes are reflected in this note.             # Drug Induced Platelet Defect: home medication list includes an antiplatelet medication  # Overweight: Estimated body mass index is 29.16 kg/m  as calculated from the following:    Height as of this encounter: 1.715 m (5' 7.5\").    Weight as of this encounter: 85.7 kg (189 lb).      "

## 2023-12-04 NOTE — ANESTHESIA CARE TRANSFER NOTE
Patient: Gregory Aguilera    Procedure: Procedure(s):  COLONOSCOPY, FLEXIBLE, WITH LESION REMOVAL USING SNARE       Diagnosis: Special screening for malignant neoplasms, colon [Z12.11]  Diagnosis Additional Information: No value filed.    Anesthesia Type:   MAC     Note:    Oropharynx: oropharynx clear of all foreign objects and spontaneously breathing  Level of Consciousness: awake  Oxygen Supplementation: room air    Independent Airway: airway patency satisfactory and stable  Dentition: dentition unchanged  Vital Signs Stable: post-procedure vital signs reviewed and stable  Report to RN Given: handoff report given  Patient transferred to: Phase II    Handoff Report: Identifed the Patient, Identified the Reponsible Provider, Reviewed the pertinent medical history, Discussed the surgical course, Reviewed Intra-OP anesthesia mangement and issues during anesthesia, Set expectations for post-procedure period and Allowed opportunity for questions and acknowledgement of understanding      Vitals:  Vitals Value Taken Time   BP     Temp     Pulse     Resp     SpO2         Electronically Signed By: DALE Holguin CRNA  December 4, 2023  11:13 AM

## 2023-12-04 NOTE — ANESTHESIA POSTPROCEDURE EVALUATION
Patient: Gregory Aguilera    Procedure: Procedure(s):  COLONOSCOPY, FLEXIBLE, WITH LESION REMOVAL USING SNARE       Anesthesia Type:  MAC    Note:  Disposition: Outpatient   Postop Pain Control: Uneventful            Sign Out: Well controlled pain   PONV: No   Neuro/Psych: Uneventful            Sign Out: Acceptable/Baseline neuro status   Airway/Respiratory: Uneventful            Sign Out: Acceptable/Baseline resp. status   CV/Hemodynamics: Uneventful            Sign Out: Acceptable CV status; No obvious hypovolemia; No obvious fluid overload   Other NRE: NONE   DID A NON-ROUTINE EVENT OCCUR? No           Last vitals:  Vitals Value Taken Time   /81 12/04/23 1136   Temp     Pulse 81 12/04/23 1136   Resp 16 12/04/23 1136   SpO2 98 % 12/04/23 1137   Vitals shown include unfiled device data.    Electronically Signed By: Farhan Mao MD  December 4, 2023  1:25 PM

## 2023-12-05 LAB
PATH REPORT.COMMENTS IMP SPEC: NORMAL
PATH REPORT.COMMENTS IMP SPEC: NORMAL
PATH REPORT.FINAL DX SPEC: NORMAL
PATH REPORT.GROSS SPEC: NORMAL
PATH REPORT.MICROSCOPIC SPEC OTHER STN: NORMAL
PATH REPORT.RELEVANT HX SPEC: NORMAL
PHOTO IMAGE: NORMAL

## 2023-12-06 NOTE — RESULT ENCOUNTER NOTE
"Mr. Aguilera -     I am writing to let you know the pathology results from the polyps that were removed at the time of your colonoscopy.  The polyps returned as \"adenomatous polyps\".  An adenoma is a type of benign polyp. If left in place for years, adenomas have a small risk of developing cancer.There was no cancer in your polyps.  Your polyps were completely removed. However, you are at risk to grow more adenomatous polyps in the future.      Because of the number of adenomatous polyps that you had, I recommend that you repeat a colonoscopy to screen for new polyps in 3-5 years.  I suggest that you discuss this with your primary care physician or provider at that time.    If you have any questions, please don't hesitate to contact the Gastroenterology nurse at [phone].       Aj Ames MD  Professor of Medicine  Division of Gastroenterology, Hepatology, and Nutrition  [facility] "

## 2023-12-25 DIAGNOSIS — R07.9 CHEST PAIN, UNSPECIFIED TYPE: ICD-10-CM

## 2023-12-25 DIAGNOSIS — Z76.0 ENCOUNTER FOR MEDICATION REFILL: ICD-10-CM

## 2023-12-26 RX ORDER — ATORVASTATIN CALCIUM 20 MG/1
20 TABLET, FILM COATED ORAL DAILY
Qty: 90 TABLET | Refills: 0 | Status: SHIPPED | OUTPATIENT
Start: 2023-12-26 | End: 2024-03-21

## 2024-03-17 DIAGNOSIS — I10 HYPERTENSION GOAL BP (BLOOD PRESSURE) < 140/90: ICD-10-CM

## 2024-03-18 RX ORDER — LISINOPRIL 20 MG/1
20 TABLET ORAL DAILY
Qty: 90 TABLET | Refills: 3 | Status: SHIPPED | OUTPATIENT
Start: 2024-03-18 | End: 2024-07-11

## 2024-03-21 DIAGNOSIS — Z76.0 ENCOUNTER FOR MEDICATION REFILL: ICD-10-CM

## 2024-03-21 DIAGNOSIS — R07.9 CHEST PAIN, UNSPECIFIED TYPE: ICD-10-CM

## 2024-03-21 RX ORDER — ATORVASTATIN CALCIUM 20 MG/1
20 TABLET, FILM COATED ORAL DAILY
Qty: 90 TABLET | Refills: 0 | Status: SHIPPED | OUTPATIENT
Start: 2024-03-21 | End: 2024-06-17

## 2024-04-13 ENCOUNTER — HEALTH MAINTENANCE LETTER (OUTPATIENT)
Age: 54
End: 2024-04-13

## 2024-06-16 DIAGNOSIS — K21.9 GASTROESOPHAGEAL REFLUX DISEASE WITHOUT ESOPHAGITIS: ICD-10-CM

## 2024-06-16 DIAGNOSIS — R07.9 CHEST PAIN, UNSPECIFIED TYPE: ICD-10-CM

## 2024-06-16 DIAGNOSIS — Z76.0 ENCOUNTER FOR MEDICATION REFILL: ICD-10-CM

## 2024-06-17 RX ORDER — ATORVASTATIN CALCIUM 20 MG/1
20 TABLET, FILM COATED ORAL DAILY
Qty: 90 TABLET | Refills: 3 | Status: SHIPPED | OUTPATIENT
Start: 2024-06-17 | End: 2024-07-11

## 2024-06-24 RX ORDER — BACLOFEN 10 MG/1
5 TABLET ORAL 2 TIMES DAILY
Qty: 90 TABLET | Refills: 1 | Status: SHIPPED | OUTPATIENT
Start: 2024-06-24 | End: 2024-07-11

## 2024-06-24 NOTE — TELEPHONE ENCOUNTER
baclofen (LIORESAL) 10 MG tablet 90 tablet 1 11/6/2023           Last Office Visit : 7/5/23  Future Office visit:  7/5/24    Routing refill request to provider for review/approval because:  Drug not on the [facility], [facility] or [facility] refill protocol

## 2024-07-05 ENCOUNTER — VIRTUAL VISIT (OUTPATIENT)
Dept: GASTROENTEROLOGY | Facility: CLINIC | Age: 54
End: 2024-07-05
Attending: INTERNAL MEDICINE
Payer: COMMERCIAL

## 2024-07-05 VITALS — WEIGHT: 198 LBS | HEIGHT: 68 IN | BODY MASS INDEX: 30.01 KG/M2

## 2024-07-05 DIAGNOSIS — K21.9 GASTROESOPHAGEAL REFLUX DISEASE WITHOUT ESOPHAGITIS: Primary | ICD-10-CM

## 2024-07-05 PROCEDURE — 99214 OFFICE O/P EST MOD 30 MIN: CPT | Mod: 95 | Performed by: PHYSICIAN ASSISTANT

## 2024-07-05 ASSESSMENT — PAIN SCALES - GENERAL: PAINLEVEL: MILD PAIN (2)

## 2024-07-05 NOTE — NURSING NOTE
Current patient location:  [place]    Is the patient currently in the state of [place]? YES    Visit mode:VIDEO    If the visit is dropped, the patient can be reconnected by: VIDEO VISIT: Text to cell phone:   Telephone Information:   Mobile [phone]       Will anyone else be joining the visit? NO  (If patient encounters technical issues they should call [phone])    How would you like to obtain your AVS? MyChart    Are changes needed to the allergy or medication list? No    Are refills needed on medications prescribed by this physician? NO    Reason for visit: JOSE ROBERTO AGUILAR

## 2024-07-05 NOTE — PROGRESS NOTES
GI CLINIC VISIT    CC/REFERRING MD:  Alexa Cordero      ASSESSMENT/PLAN:  # GERD, atypical chest pain  Symptoms seem to wax and wane - recently switched from esomeprazole to omeprazole 20mg as he felt symptoms were returning despite use. I did review previous [facility] records from 2018, looks like bravo testing showed a Demeester of 4.1, on day 1 and 12 on day 2. It certainly seems like his symptoms have some component of reflux, but also considering functional dyspepsis, reflux hypersensitivity, etc. He has not felt nortryptyline to be helpful in the past, but is open to trying this again. We discussed having him undergo pH imepdance testing ON PPI, but patient would like to hold off for now. Will have him increase omeprazole to 40mg daily, and have him take pepcid more frequently.   --increase omeprazole to 40mg daily.  --annual PPI labs  --increase use of pepcid, 20mg PRN.   --future considerations: could consider pH imepdance testing ON therapy, could also retry nortriptyline.     # right sided abdominal pain - work-up in the past included ultrasound which was normal - previous CT scan which was unrevealing. Has not tried topical therapies, can trial lidoderm patches OTC.      # history of colon polyps - repeat c-scope [id number]        RTC 6 months    Thank you for this consultation.  It was a pleasure to participate in the care of this patient; please contact us with any further questions.       36 minutes spent on the date of the encounter doing chart review, review of outside records, patient visit, and documentation    This note was created with voice recognition software, and while reviewed for accuracy, typos may remain.    Dinora Pathak PA-C  Division of Gastroenterology, Hepatology and Nutrition  [facility]  53 y/o M presents for follow up of GERD.     Patient was last evaluated by Dr. Cordero in July 2023, please see visit details below:  Hemant  presents today for a video visit for follow-up.  Symptoms are pretty stable - comes and goes - has been trying to taper off the omeprazole - does add famotidine as needed.  Tries to go 1-2 days without taking it.  Remains on the baclofen - taking twice daily.     Has had recurring right sided pain - comes and goes.  Did have a CT scan for this which was unrevealing aside from possible possible cystitis. Pain comes and goes randomly - worse with movement and certain positions. One time did seem to get worse when drinking with a latte but pain was already present when he drank it.  No associated GI symptoms (nausea, vomiting, changes in bowels, etc).  Eating and bowel movements are normal during these episodes.    7/5/24, initial visit with patient, Dinora Pathak PA-C:   Patient switched from esomperazole 20mg to omeprazole 20mg daily, about two weeks ago as he felt esomeprazole wasn't working as well anymore. Symptoms are relatively stable - will have atypical chest discomfort usually a couple times as needed. Does take pepcid as needed, but doesn't take it that frequently, feels he could take this more often. Did feel mylanta did provide temporary relief. Does take baclofen twice daily.     Will continue to have R sided abdominal pain that comes and goes - does not feel there is a set pattern. In the last two months has had it a couple of times. Pain will gradually build up when it comes on. Can last a few days to a week or so. If he bends a certain way can feel it more.             ROS:    No fevers or chills  No weight loss  No shortness of breath or wheezing  No chest pain or pressure  No odynophagia or dysphagia  No BRBPR, hematochezia, melena  No anxiety or depression      PROBLEM LIST  Patient Active Problem List    Diagnosis Date Noted    Adhesive capsulitis of right shoulder 08/30/2022     Priority: Medium    Hip pain, right 11/16/2021     Priority: Medium    PATTI (obstructive sleep apnea) 12/03/2020      Priority: Medium     HSAT  11/16/2020  Weight 198 lbs BMI 29.18  AHI 25.9, Lowest O2 SAT 75%      Overweight (BMI 25.0-29.9) 10/09/2020     Priority: Medium    Eosinophilic esophagitis 04/11/2018     Priority: Medium    Testis pain 10/29/2014     Priority: Medium    Chronic abdominal pain 03/13/2014     Priority: Medium    Hyperlipidemia LDL goal <130 05/02/2013     Priority: Medium    Hypertension goal BP (blood pressure) < 140/90 09/01/2011     Priority: Medium    Allergic rhinitis 03/18/2005     Priority: Medium     Allergic rhinitis  Problem list name updated by automated process. Provider to review      Other chronic allergic conjunctivitis 03/18/2005     Priority: Medium     Allergic conjunctivitis         PERTINENT PAST MEDICAL HISTORY:    Past Medical History:   Diagnosis Date    Adhesive capsulitis of left shoulder 7/1/2020    Allergic rhinitis, cause unspecified     Allergic rhinitis    Essential hypertension, benign     History of blood transfusion     Other chronic allergic conjunctivitis     Allergic conjunctivitis    Tubular adenoma x3 09/20/2019    Surveillance colonoscopy recommended 9/2022       PREVIOUS SURGERIES:    Past Surgical History:   Procedure Laterality Date    CLOSED REDUCTION, PERCUTANEOUS PINNING FINGER, COMBINED  5/25/2012    Procedure:COMBINED CLOSED REDUCTION, PERCUTANEOUS PINNING FINGER; Closed reductionand fixation by pinning of Right Thumb fracture      COLONOSCOPY N/A 8/25/2014    Procedure: COMBINED COLONOSCOPY, SINGLE BIOPSY/POLYPECTOMY BY BIOPSY;  Surgeon: Misael Jarvis MD;  Location:  GI    COLONOSCOPY N/A 9/20/2019    Procedure: COLONOSCOPY, WITH POLYPECTOMY AND BIOPSY;  Surgeon: Fred Edge MD;  Location: [facility]    COLONOSCOPY N/A 12/4/2023    Procedure: COLONOSCOPY, FLEXIBLE, WITH LESION REMOVAL USING SNARE;  Surgeon: Aj Ames MD;  Location:  GI    DENTAL SURGERY      ESOPHAGOSCOPY, GASTROSCOPY, DUODENOSCOPY (EGD), COMBINED  6/17/2013     Procedure: COMBINED ESOPHAGOSCOPY, GASTROSCOPY, DUODENOSCOPY (EGD), BIOPSY SINGLE OR MULTIPLE;;  Surgeon: Benjy Marinelli MD;  Location: [facility]    ESOPHAGOSCOPY, GASTROSCOPY, DUODENOSCOPY (EGD), COMBINED N/A 8/25/2014    Procedure: COMBINED ESOPHAGOSCOPY, GASTROSCOPY, DUODENOSCOPY (EGD), BIOPSY SINGLE OR MULTIPLE;  Surgeon: Misael Jarvis MD;  Location: [facility]    LASIK BILATERAL      thumb surgery      wisdom teeth removed  8-2014       PREVIOUS ENDOSCOPY:  C-scope (12/2023): Six sessile polyps were found in the descending colon and transverse        colon. The polyps were 1 to 4 mm in size. Some polyps were removed with        a cold snare, and others were removed with a jumbo forceps.   Final Diagnosis   COLON, TRANSVERSE/DESCENDING, POLYPS X6:   - Tubular adenoma(s); negative for high-grade dysplasia     ALLERGIES:     Allergies   Allergen Reactions    Dust Mites     Grass     Mold     No Known Drug Allergy     Trees        PERTINENT MEDICATIONS:    Current Outpatient Medications:     acetaminophen (TYLENOL) 325 MG tablet, Take 325-650 mg by mouth every 6 hours as needed for mild pain, Disp: , Rfl:     albuterol (PROAIR HFA/PROVENTIL HFA/VENTOLIN HFA) 108 (90 Base) MCG/ACT inhaler, INHALE 2 PUFFS INTO THE LUNGS EVERY 6 HOURS AS NEEDED FOR SHORTNESS OF BREATH, WHEEZING OR COUGH, Disp: 18 g, Rfl: 4    aspirin (ASA) 81 MG EC tablet, Take 1 tablet (81 mg) by mouth daily, Disp: , Rfl:     atorvastatin (LIPITOR) 20 MG tablet, TAKE 1 TABLET BY MOUTH EVERY DAY, Disp: 90 tablet, Rfl: 3    baclofen (LIORESAL) 10 MG tablet, TAKE 0.5 TABLETS (5 MG) BY MOUTH 2 TIMES DAILY, Disp: 90 tablet, Rfl: 1    cyclobenzaprine (FLEXERIL) 5 MG tablet, Take 1 tablet (5 mg) by mouth nightly as needed for muscle spasms, Disp: 60 tablet, Rfl: 1    esomeprazole (NEXIUM) 20 MG DR capsule, Take 1 capsule (20 mg) by mouth every morning (before breakfast) Take 30-60 minutes before eating., Disp: 90 capsule, Rfl: 11    fluticasone  (FLONASE) 50 MCG/ACT nasal spray, Spray 1 spray into both nostrils daily, Disp: , Rfl:     ibuprofen (ADVIL/MOTRIN) 200 MG tablet, Take 400 mg by mouth every 4 hours as needed for mild pain, Disp: , Rfl:     lisinopril (ZESTRIL) 20 MG tablet, TAKE 1 TABLET BY MOUTH EVERY DAY, Disp: 90 tablet, Rfl: 3    loratadine (CLARITIN REDITABS) 10 MG dispersible tablet, Take 10 mg by mouth as needed , Disp: , Rfl:     oxybutynin (DITROPAN) 5 MG tablet, Take 1 tablet (5 mg) by mouth 3 times daily, Disp: 30 tablet, Rfl: 3    SOCIAL HISTORY:    Social History     Socioeconomic History    Marital status:      Spouse name: Not on file    Number of children: 2    Years of education: Not on file    Highest education level: Not on file   Occupational History    Occupation:    Tobacco Use    Smoking status: Never     Passive exposure: Never    Smokeless tobacco: Never   Vaping Use    Vaping status: Never Used   Substance and Sexual Activity    Alcohol use: Yes     Comment: couple drinks a week    Drug use: No    Sexual activity: Yes     Partners: Female     Birth control/protection: Condom   Other Topics Concern    Parent/sibling w/ CABG, MI or angioplasty before 65F 55M? No   Social History Narrative    Dairy/d 2-3 servings/d     Caffeine 1-2 servings/d    Exercise 2 x week-gym regularly treadmill/hockey    Sunscreen used - No    Seatbelts used - Yes    Working smoke/CO detectors in the home - Yes    Guns stored in the home - No    Self Breast Exams - NA    Self Testicular Exam - yes    Eye Exam up to date - Yes   2008    Dental Exam up to date - Yes  2008    Pap Smear up to date - NA    Mammogram up to date - NA    PSA up to date - No    Dexa Scan up to date - NA    Flex Sig / Colonoscopy up to date - No    Immunizations up to date - 2006 td    Abuse: Current or Past(Physical, Sexual or Emotional)- No    Do you feel safe in your environment - Yes    Updated 5/2009     Social Determinants of Health     Financial Resource  Strain: Not on file   Food Insecurity: Not on file   Transportation Needs: Not on file   Physical Activity: Not on file   Stress: Not on file   Social Connections: Not on file   Interpersonal Safety: Not on file   Housing Stability: Not on file       FAMILY HISTORY:    Family History   Problem Relation Age of Onset    Breast Cancer Mother     Arthritis Father     Genitourinary Problems Father         stones    Cancer Father         skin    C.A.D. Father         stents/age late 60s    Coronary Artery Disease Father         Stents    Prostate Cancer Father         late 50s    Snoring Father     Asthma Sister     Depression Sister     Cancer Paternal Grandfather         ? type    Eye Disorder Paternal Grandfather         cateracts       Past/family/social history reviewed and no changes    PHYSICAL EXAMINATION:  General: Patient appears well in no acute distress.   Skin: No visualized rash or lesions on visualized skin  Eyes: EOMI, no erythema, sclera icterus or discharge noted  Resp: Appears to be breathing comfortably without accessory muscle usage, speaking in full sentences, no cough  MSK: Appears to have normal range of motion based on visualized movements  Neurologic: No apparent tremors, facial movements symmetric  Psych: normal affect , alert and oriented          Video-Visit Details    Video Start Time: 10:31 AM    Type of service:  Video Visit    Video End Time:10:48 AM    Originating Location (pt. Location): Home      Distant Location (provider location):  Off-site    Platform used for Video Visit: Well

## 2024-07-05 NOTE — PATIENT INSTRUCTIONS
It was a pleasure meeting with you today and discussing your healthcare plan. Below is a summary of what we covered:    --increase omeprazole to 40mg daily (it looks like it would be cheaper for you to get it over the counter).  --please take pepcid 20mg as needed (also looks like cheaper for you to buy over the counter). Can take this more frequently.   --can trial lidoderm patches over the counter for right sided abdominal pain.   --obtain annual PPI labs    Return to GI clinic in 6 months.      Please see below for any additional questions and scheduling guidelines.    Sign up for [employer]: [employer] patient portal serves as a secure platform for accessing your medical records from the [facility]. Additionally, [employer] facilitates easy, timely, and secure messaging with your care team. If you have not signed up, you may do so by using the provided code or calling [phone].    Coordinating your care after your visit:  There are multiple options for scheduling your follow-up care based on your provider's recommendation.    How do I schedule a follow-up clinic appointment:   After your appointment, you may receive scheduling assistance with the Clinic Coordinators by having a seat in the waiting room and a Clinic Coordinator will call you up to schedule.  Virtual visits or after you leave the clinic:  Your provider has placed a follow-up order in the [employer] portal for scheduling your return appointment. A member of the scheduling team will contact you to schedule.  [employer] Scheduling: Timely scheduling through [employer] is advised to ensure appointment availability.   Call to schedule: You may schedule your follow-up appointment(s) by calling [phone], option 1.    How do I schedule my endoscopy or colonoscopy procedure:  If a procedure, such as a colonoscopy or upper endoscopy was ordered by your provider, the scheduling team will contact you to schedule this procedure. Or you may choose to call to  schedule at   [phone], option 2.  Please allow 20-30 minutes when scheduling a procedure.    How do I get my blood work done? To get your blood work done, you need to schedule a lab appointment at an [facility]. There are multiple ways to schedule:   At the clinic: The Clinic Coordinator you meet after your visit can help you schedule a lab appointment.   [employer] scheduling: [employer] offers online lab scheduling at all [facility] laboratory locations.   Call to schedule: You can call [phone] to schedule your lab appointment.    How do I schedule my imaging study: To schedule imaging studies, such as CT scans, ultrasounds, MRIs, or X-rays, contact Imaging Services at [phone].    How do I schedule a referral to another doctor: If your provider recommended a referral to another specialist(s), the referral order was placed by your provider. You will receive a phone call to schedule this referral, or you may choose to call the number attached to the referral to self-schedule.    For Post-Visit Question(s):  For any inquiries following today's visit:  Please utilize [employer] messaging and allow 48 hours for reply or contact the Call Center during normal business hours at [phone], option 3.  For Emergent After-hours questions, contact the On-Call GI Fellow through the [facility]  at [phone].  In addition, you may contact your Nurse directly using the provided contact information.    Test Results:  Test results will be accessible via [employer] in compliance with the 21st Century Cures Act. This means that your results will be available to you at the same time as your provider. Often you may see your results before your provider does. Results are reviewed by staff within two weeks with communication follow-up. Results may be released in the patient portal prior to your care team review.    Prescription Refill(s):  Medication prescribed by your provider will be  addressed during your visit. For future refills, please coordinate with your pharmacy. If you have not had a recent clinic visit or routine labs, for your safety, your provider may not be able to refill your prescription.

## 2024-07-06 SDOH — HEALTH STABILITY: PHYSICAL HEALTH: ON AVERAGE, HOW MANY DAYS PER WEEK DO YOU ENGAGE IN MODERATE TO STRENUOUS EXERCISE (LIKE A BRISK WALK)?: 5 DAYS

## 2024-07-06 SDOH — HEALTH STABILITY: PHYSICAL HEALTH: ON AVERAGE, HOW MANY MINUTES DO YOU ENGAGE IN EXERCISE AT THIS LEVEL?: 30 MIN

## 2024-07-06 ASSESSMENT — SOCIAL DETERMINANTS OF HEALTH (SDOH): HOW OFTEN DO YOU GET TOGETHER WITH FRIENDS OR RELATIVES?: ONCE A WEEK

## 2024-07-11 ENCOUNTER — OFFICE VISIT (OUTPATIENT)
Dept: FAMILY MEDICINE | Facility: CLINIC | Age: 54
End: 2024-07-11
Payer: COMMERCIAL

## 2024-07-11 ENCOUNTER — TELEPHONE (OUTPATIENT)
Dept: FAMILY MEDICINE | Facility: CLINIC | Age: 54
End: 2024-07-11

## 2024-07-11 VITALS
DIASTOLIC BLOOD PRESSURE: 99 MMHG | WEIGHT: 204 LBS | HEIGHT: 68 IN | RESPIRATION RATE: 16 BRPM | OXYGEN SATURATION: 97 % | HEART RATE: 66 BPM | BODY MASS INDEX: 30.92 KG/M2 | TEMPERATURE: 97.2 F | SYSTOLIC BLOOD PRESSURE: 143 MMHG

## 2024-07-11 DIAGNOSIS — R10.84 ABDOMINAL PAIN, GENERALIZED: ICD-10-CM

## 2024-07-11 DIAGNOSIS — G89.29 CHRONIC RIGHT-SIDED THORACIC BACK PAIN: ICD-10-CM

## 2024-07-11 DIAGNOSIS — K21.9 GASTROESOPHAGEAL REFLUX DISEASE WITHOUT ESOPHAGITIS: ICD-10-CM

## 2024-07-11 DIAGNOSIS — M54.6 CHRONIC RIGHT-SIDED THORACIC BACK PAIN: ICD-10-CM

## 2024-07-11 DIAGNOSIS — I10 HYPERTENSION GOAL BP (BLOOD PRESSURE) < 140/90: ICD-10-CM

## 2024-07-11 DIAGNOSIS — R07.9 CHEST PAIN, UNSPECIFIED TYPE: ICD-10-CM

## 2024-07-11 DIAGNOSIS — Z00.00 ROUTINE GENERAL MEDICAL EXAMINATION AT A HEALTH CARE FACILITY: Primary | ICD-10-CM

## 2024-07-11 DIAGNOSIS — E78.5 HYPERLIPIDEMIA LDL GOAL <130: ICD-10-CM

## 2024-07-11 DIAGNOSIS — Z76.0 ENCOUNTER FOR MEDICATION REFILL: ICD-10-CM

## 2024-07-11 LAB
ANION GAP SERPL CALCULATED.3IONS-SCNC: 8 MMOL/L (ref 7–15)
BUN SERPL-MCNC: 20.2 MG/DL (ref 6–20)
CALCIUM SERPL-MCNC: 9.2 MG/DL (ref 8.6–10)
CALCIUM SERPL-MCNC: 9.2 MG/DL (ref 8.6–10)
CHLORIDE SERPL-SCNC: 106 MMOL/L (ref 98–107)
CHOLEST SERPL-MCNC: 147 MG/DL
CREAT SERPL-MCNC: 1.02 MG/DL (ref 0.67–1.17)
DEPRECATED HCO3 PLAS-SCNC: 28 MMOL/L (ref 22–29)
EGFRCR SERPLBLD CKD-EPI 2021: 86 ML/MIN/1.73M2
ERYTHROCYTE [DISTWIDTH] IN BLOOD BY AUTOMATED COUNT: 12 % (ref 10–15)
FASTING STATUS PATIENT QL REPORTED: NO
FASTING STATUS PATIENT QL REPORTED: NO
FERRITIN SERPL-MCNC: 128 NG/ML (ref 31–409)
GLUCOSE SERPL-MCNC: 107 MG/DL (ref 70–99)
HCT VFR BLD AUTO: 44.1 % (ref 40–53)
HDLC SERPL-MCNC: 39 MG/DL
HGB BLD-MCNC: 15 G/DL (ref 13.3–17.7)
IRON BINDING CAPACITY (ROCHE): 263 UG/DL (ref 240–430)
IRON SATN MFR SERPL: 26 % (ref 15–46)
IRON SERPL-MCNC: 68 UG/DL (ref 61–157)
LDLC SERPL CALC-MCNC: 63 MG/DL
LIPASE SERPL-CCNC: 26 U/L (ref 13–60)
MAGNESIUM SERPL-MCNC: 2.1 MG/DL (ref 1.7–2.3)
MCH RBC QN AUTO: 30.1 PG (ref 26.5–33)
MCHC RBC AUTO-ENTMCNC: 34 G/DL (ref 31.5–36.5)
MCV RBC AUTO: 88 FL (ref 78–100)
NONHDLC SERPL-MCNC: 108 MG/DL
PHOSPHATE SERPL-MCNC: 3.5 MG/DL (ref 2.5–4.5)
PLATELET # BLD AUTO: 191 10E3/UL (ref 150–450)
POTASSIUM SERPL-SCNC: 4.8 MMOL/L (ref 3.4–5.3)
RBC # BLD AUTO: 4.99 10E6/UL (ref 4.4–5.9)
SODIUM SERPL-SCNC: 142 MMOL/L (ref 135–145)
TRIGL SERPL-MCNC: 223 MG/DL
VIT B12 SERPL-MCNC: 425 PG/ML (ref 232–1245)
VIT D+METAB SERPL-MCNC: 16 NG/ML (ref 20–50)
WBC # BLD AUTO: 4.7 10E3/UL (ref 4–11)

## 2024-07-11 PROCEDURE — 36415 COLL VENOUS BLD VENIPUNCTURE: CPT | Performed by: FAMILY MEDICINE

## 2024-07-11 PROCEDURE — 99000 SPECIMEN HANDLING OFFICE-LAB: CPT | Performed by: FAMILY MEDICINE

## 2024-07-11 PROCEDURE — 82306 VITAMIN D 25 HYDROXY: CPT | Performed by: FAMILY MEDICINE

## 2024-07-11 PROCEDURE — 83540 ASSAY OF IRON: CPT | Performed by: FAMILY MEDICINE

## 2024-07-11 PROCEDURE — 84100 ASSAY OF PHOSPHORUS: CPT | Performed by: FAMILY MEDICINE

## 2024-07-11 PROCEDURE — 99396 PREV VISIT EST AGE 40-64: CPT | Mod: 25 | Performed by: FAMILY MEDICINE

## 2024-07-11 PROCEDURE — 80048 BASIC METABOLIC PNL TOTAL CA: CPT | Performed by: FAMILY MEDICINE

## 2024-07-11 PROCEDURE — 80061 LIPID PANEL: CPT | Performed by: FAMILY MEDICINE

## 2024-07-11 PROCEDURE — 90471 IMMUNIZATION ADMIN: CPT | Performed by: FAMILY MEDICINE

## 2024-07-11 PROCEDURE — 83690 ASSAY OF LIPASE: CPT | Performed by: FAMILY MEDICINE

## 2024-07-11 PROCEDURE — 82607 VITAMIN B-12: CPT | Performed by: FAMILY MEDICINE

## 2024-07-11 PROCEDURE — 85027 COMPLETE CBC AUTOMATED: CPT | Performed by: FAMILY MEDICINE

## 2024-07-11 PROCEDURE — 82728 ASSAY OF FERRITIN: CPT | Performed by: FAMILY MEDICINE

## 2024-07-11 PROCEDURE — 83550 IRON BINDING TEST: CPT | Performed by: FAMILY MEDICINE

## 2024-07-11 PROCEDURE — 99213 OFFICE O/P EST LOW 20 MIN: CPT | Mod: 25 | Performed by: FAMILY MEDICINE

## 2024-07-11 PROCEDURE — 84630 ASSAY OF ZINC: CPT | Mod: 90 | Performed by: FAMILY MEDICINE

## 2024-07-11 PROCEDURE — 83735 ASSAY OF MAGNESIUM: CPT | Performed by: FAMILY MEDICINE

## 2024-07-11 PROCEDURE — 90746 HEPB VACCINE 3 DOSE ADULT IM: CPT | Performed by: FAMILY MEDICINE

## 2024-07-11 RX ORDER — BACLOFEN 10 MG/1
5 TABLET ORAL 2 TIMES DAILY
Qty: 90 TABLET | Refills: 1 | Status: SHIPPED | OUTPATIENT
Start: 2024-07-11

## 2024-07-11 RX ORDER — ATORVASTATIN CALCIUM 20 MG/1
20 TABLET, FILM COATED ORAL DAILY
Qty: 90 TABLET | Refills: 3 | Status: SHIPPED | OUTPATIENT
Start: 2024-07-11

## 2024-07-11 RX ORDER — LISINOPRIL 20 MG/1
20 TABLET ORAL DAILY
Qty: 90 TABLET | Refills: 3 | Status: SHIPPED | OUTPATIENT
Start: 2024-07-11

## 2024-07-11 NOTE — PATIENT INSTRUCTIONS
Patient Education   Preventive Care Advice   This is general advice given by our system to help you stay healthy. However, your care team may have specific advice just for you. Please talk to your care team about your preventive care needs.  Nutrition  Eat 5 or more servings of fruits and vegetables each day.  Try wheat bread, brown rice and whole grain pasta (instead of white bread, rice, and pasta).  Get enough calcium and vitamin D. Check the label on foods and aim for 100% of the RDA (recommended daily allowance).  Lifestyle  Exercise at least 150 minutes each week  (30 minutes a day, 5 days a week).  Do muscle strengthening activities 2 days a week. These help control your weight and prevent disease.  No smoking.  Wear sunscreen to prevent skin cancer.  Have a dental exam and cleaning every 6 months.  Yearly exams  See your health care team every year to talk about:  Any changes in your health.  Any medicines your care team has prescribed.  Preventive care, family planning, and ways to prevent chronic diseases.  Shots (vaccines)   HPV shots (up to age 26), if you've never had them before.  Hepatitis B shots (up to age 59), if you've never had them before.  COVID-19 shot: Get this shot when it's due.  Flu shot: Get a flu shot every year.  Tetanus shot: Get a tetanus shot every 10 years.  Pneumococcal, hepatitis A, and RSV shots: Ask your care team if you need these based on your risk.  Shingles shot (for age 50 and up)  General health tests  Diabetes screening:  Starting at age 35, Get screened for diabetes at least every 3 years.  If you are younger than age 35, ask your care team if you should be screened for diabetes.  Cholesterol test: At age 39, start having a cholesterol test every 5 years, or more often if advised.  Bone density scan (DEXA): At age 50, ask your care team if you should have this scan for osteoporosis (brittle bones).  Hepatitis C: Get tested at least once in your life.  STIs (sexually  transmitted infections)  Before age 24: Ask your care team if you should be screened for STIs.  After age 24: Get screened for STIs if you're at risk. You are at risk for STIs (including HIV) if:  You are sexually active with more than one person.  You don't use condoms every time.  You or a partner was diagnosed with a sexually transmitted infection.  If you are at risk for HIV, ask about PrEP medicine to prevent HIV.  Get tested for HIV at least once in your life, whether you are at risk for HIV or not.  Cancer screening tests  Cervical cancer screening: If you have a cervix, begin getting regular cervical cancer screening tests starting at age 21.  Breast cancer scan (mammogram): If you've ever had breasts, begin having regular mammograms starting at age 40. This is a scan to check for breast cancer.  Colon cancer screening: It is important to start screening for colon cancer at age 45.  Have a colonoscopy test every 10 years (or more often if you're at risk) Or, ask your provider about stool tests like a FIT test every year or Cologuard test every 3 years.  To learn more about your testing options, visit:   .  For help making a decision, visit:   https://bit.ly/oo51642.  Prostate cancer screening test: If you have a prostate, ask your care team if a prostate cancer screening test (PSA) at age 55 is right for you.  Lung cancer screening: If you are a current or former smoker ages 50 to 80, ask your care team if ongoing lung cancer screenings are right for you.  For informational purposes only. Not to replace the advice of your health care provider. Copyright   2023 [employer]. All rights reserved. Clinically reviewed by the [facility]. [employer] [id number] - REV 01/24.

## 2024-07-11 NOTE — TELEPHONE ENCOUNTER
Patient Quality Outreach    Patient is due for the following:   Hypertension -  Hypertension follow-up visit    Next Steps:   Patient declined follow up at this time.    Type of outreach:    Phone, spoke to patient/parent. Patient/parent will call back to schedule.    Next Steps:  Reach out within 90 days via Phone.    Max number of attempts reached: Yes. Will try again in 90 days if patient still on fail list.    Questions for provider review:    None           Ernie Springer MA  Chart routed to Care Team.      
No

## 2024-07-11 NOTE — PROGRESS NOTES
"Preventive Care Visit  [facility], Family Medicine  Jul 11, 2024        Assessment & Plan   Problem List Items Addressed This Visit       Hypertension goal BP (blood pressure) < 140/90    Relevant Medications    lisinopril (ZESTRIL) 20 MG tablet    Other Relevant Orders    Basic metabolic panel  (Ca, Cl, CO2, Creat, Gluc, K, Na, BUN)    Hyperlipidemia LDL goal <130 - Primary    Relevant Medications    atorvastatin (LIPITOR) 20 MG tablet    Other Relevant Orders    Lipid panel reflex to direct LDL Non-fasting     Other Visit Diagnoses       Chest pain, unspecified type        Relevant Medications    atorvastatin (LIPITOR) 20 MG tablet    Encounter for medication refill        Relevant Medications    atorvastatin (LIPITOR) 20 MG tablet    Gastroesophageal reflux disease without esophagitis        Relevant Medications    baclofen (LIORESAL) 10 MG tablet    omeprazole (PRILOSEC) 20 MG DR capsule    Chronic right-sided thoracic back pain        Relevant Medications    baclofen (LIORESAL) 10 MG tablet           Only took BP meds 1 hour ago  Continue same BP meds without change  Discussed sports physician - he will consider. Quadratus pain.  No signs of memory problems as he had completely normal 6 sit test.      Patient has been advised of split billing requirements and indicates understanding: Yes       BMI  Estimated body mass index is 31.48 kg/m  as calculated from the following:    Height as of this encounter: 1.715 m (5' 7.5\").    Weight as of this encounter: 92.5 kg (204 lb).       Counseling  Appropriate preventive services were discussed with this patient, including applicable screening as appropriate for fall prevention, nutrition, physical activity, Tobacco-use cessation, weight loss and cognition.  Checklist reviewing preventive services available has been given to the patient.  Reviewed patient's diet, addressing concerns and/or questions.       Subjective   Hemant is " a 54 year old, presenting for the following:  Physical        7/11/2024     8:00 AM   Additional Questions   Roomed by Louise CLINTON CMA        Health Care Directive  Patient does not have a Health Care Directive or Living Will: Discussed advance care planning with patient; information given to patient to review.    HPI  Recurrent right lower back pain, comes and goes. CT unremarkable in the past. Hurts to breathe in and raise right arm. Occasional hand pains, he wonders if he has arthritis. He injured his right thigh/hamstring last week playing baseball.    PATTI - typically uses CPAP.    He wonders if he is losing his memory.    Sedentary last week cabin time.    Six Item Cognitive Impairment Test   (6CIT):    What year is it?                               Correct - 0 points  What month is it?                               Correct - 0 points    Give the patient an address to remember with five components:   [place] ( first and last name - 2 components)   [address]  (number and name of street - 2 components)   [place] ( city - 1 component)    About what time is it (within the hour)? Correct - 0 points  Count backwards from 20 to 1:   Correct - 0 points  Say the months of the year in reverse: Correct - 0 points  Repeat the address phrase:   Correct - 0 points    Total 6CIT Score:      0/28    Interpretation: The 6CIT uses an inverse score and questions are weighted to produce a total out of 28. Scores of 0-7 are considered normal and 8 or more significant.    Advantages The test has high sensitivity without compromising specificity even in mild dementia. It is easy to translate linguistically and culturally.  Disadvantages The main disadvantage is in the scoring and weighting of the test, which is initially confusing, however computer models have simplified this greatly.    Probability Statistics: At the 7/8 cut off: Overall figures sensitivity 90% specificity 100%, in mild dementia sensitivity = 78% ,  specificity = 100%    Copyright 2000 [facility], [facility], . Courtesy of Dr. Jan Phipps      Working with gastroenterology for abdominal discomforts.          7/6/2024   General Health   How would you rate your overall physical health? Good   Feel stress (tense, anxious, or unable to sleep) Not at all            7/6/2024   Nutrition   Three or more servings of calcium each day? Yes   Diet: Regular (no restrictions)   How many servings of fruit and vegetables per day? (!) 2-3   How many sweetened beverages each day? 0-1            7/6/2024   Exercise   Days per week of moderate/strenous exercise 5 days   Average minutes spent exercising at this level 30 min            7/6/2024   Social Factors   Frequency of gathering with friends or relatives Once a week   Worry food won't last until get money to buy more No   Food not last or not have enough money for food? No   Do you have housing? (Housing is defined as stable permanent housing and does not include staying ouside in a car, in a tent, in an abandoned building, in an overnight shelter, or couch-surfing.) No   Are you worried about losing your housing? No   Lack of transportation? No   Unable to get utilities (heat,electricity)? No   Want help with housing or utility concern? No      (!) HOUSING CONCERN PRESENT      7/6/2024   Fall Risk   Fallen 2 or more times in the past year? No   Trouble with walking or balance? No             7/6/2024   Dental   Dentist two times every year? Yes            7/6/2024   TB Screening   Were you born outside of the [place]? Yes            Today's PHQ-2 Score:       7/10/2024    11:07 AM   PHQ-2 ( 1999 [employer])   Q1: Little interest or pleasure in doing things 0   Q2: Feeling down, depressed or hopeless 0   PHQ-2 Score 0   Q1: Little interest or pleasure in doing things Not at all   Q2: Feeling down, depressed or hopeless Not at all   PHQ-2 Score 0           7/6/2024   Substance Use   Alcohol more than 3/day or more  "than 7/wk No   Do you use any other substances recreationally? No        Social History     Tobacco Use    Smoking status: Never     Passive exposure: Never    Smokeless tobacco: Never   Vaping Use    Vaping status: Never Used   Substance Use Topics    Alcohol use: Yes     Comment: couple drinks a week    Drug use: No           7/6/2024   STI Screening   New sexual partner(s) since last STI/HIV test? No      Last PSA:   PSA   Date Value Ref Range Status   09/18/2020 0.71 0 - 4 ug/L Final     Comment:     Assay Method:  Chemiluminescence using Siemens Vista analyzer     Prostate Specific Antigen Screen   Date Value Ref Range Status   02/22/2023 1.03 0.00 - 3.50 ng/mL Final     PSA Tumor Marker   Date Value Ref Range Status   11/08/2021 0.82 0.00 - 3.50 ug/L Final     ASCVD Risk   The 10-year ASCVD risk score (Oswaldo WHARTON, et al., 2019) is: 7.9%    Values used to calculate the score:      Age: 54 years      Sex: Male      Is Non- : No      Diabetic: No      Tobacco smoker: No      Systolic Blood Pressure: 143 mmHg      Is BP treated: Yes      HDL Cholesterol: 43 mg/dL      Total Cholesterol: 163 mg/dL           Reviewed and updated as needed this visit by Provider                         Objective    Exam  BP (!) 143/99 (BP Location: Right arm, Patient Position: Chair, Cuff Size: Adult Large)   Pulse 66   Temp 97.2  F (36.2  C) (Temporal)   Resp 16   Ht 1.715 m (5' 7.5\")   Wt 92.5 kg (204 lb)   SpO2 97%   BMI 31.48 kg/m     Estimated body mass index is 31.48 kg/m  as calculated from the following:    Height as of this encounter: 1.715 m (5' 7.5\").    Weight as of this encounter: 92.5 kg (204 lb).    Physical Exam  GENERAL: alert and no distress  NECK: no adenopathy, no asymmetry, masses, or scars  RESP: lungs clear to auscultation - no rales, rhonchi or wheezes  CV: regular rate and rhythm, normal S1 S2, no S3 or S4, no murmur, click or rub, no peripheral edema  ABDOMEN: soft, " nontender, no hepatosplenomegaly, no masses and bowel sounds normal  MS: no gross musculoskeletal defects noted, no edema, UE quick test normal, normal job/speed/yergasen b/l  Rib wall normal        Signed Electronically by: EZIO CAMARGO DO

## 2024-07-13 LAB — ZINC SERPL-MCNC: 90.6 UG/DL

## 2024-08-11 ENCOUNTER — OFFICE VISIT (OUTPATIENT)
Dept: URGENT CARE | Facility: URGENT CARE | Age: 54
End: 2024-08-11
Payer: COMMERCIAL

## 2024-08-11 VITALS
HEIGHT: 68 IN | WEIGHT: 193 LBS | DIASTOLIC BLOOD PRESSURE: 86 MMHG | OXYGEN SATURATION: 98 % | RESPIRATION RATE: 15 BRPM | HEART RATE: 83 BPM | BODY MASS INDEX: 29.25 KG/M2 | TEMPERATURE: 97.7 F | SYSTOLIC BLOOD PRESSURE: 132 MMHG

## 2024-08-11 DIAGNOSIS — R07.0 THROAT PAIN: Primary | ICD-10-CM

## 2024-08-11 LAB
DEPRECATED S PYO AG THROAT QL EIA: NEGATIVE
ERYTHROCYTE [DISTWIDTH] IN BLOOD BY AUTOMATED COUNT: 11.6 % (ref 10–15)
HCT VFR BLD AUTO: 47.6 % (ref 40–53)
HGB BLD-MCNC: 15.8 G/DL (ref 13.3–17.7)
MCH RBC QN AUTO: 29.2 PG (ref 26.5–33)
MCHC RBC AUTO-ENTMCNC: 33.2 G/DL (ref 31.5–36.5)
MCV RBC AUTO: 88 FL (ref 78–100)
PLATELET # BLD AUTO: 206 10E3/UL (ref 150–450)
RBC # BLD AUTO: 5.42 10E6/UL (ref 4.4–5.9)
WBC # BLD AUTO: 5.9 10E3/UL (ref 4–11)

## 2024-08-11 PROCEDURE — 36415 COLL VENOUS BLD VENIPUNCTURE: CPT | Performed by: PHYSICIAN ASSISTANT

## 2024-08-11 PROCEDURE — 85027 COMPLETE CBC AUTOMATED: CPT | Performed by: PHYSICIAN ASSISTANT

## 2024-08-11 PROCEDURE — 87651 STREP A DNA AMP PROBE: CPT | Performed by: PHYSICIAN ASSISTANT

## 2024-08-11 PROCEDURE — 99213 OFFICE O/P EST LOW 20 MIN: CPT | Performed by: PHYSICIAN ASSISTANT

## 2024-08-11 NOTE — PATIENT INSTRUCTIONS
Your labs are reassuring   Salt water gargles for Sore throat  Tylenol for pain  Continue with allergy medication  If you continue to have sore throat, please follow-up with ENT referral

## 2024-08-11 NOTE — PROGRESS NOTES
Assessment & Plan     1. Throat pain  Unclear etiology of patient's sore throat.  He does not have other viral symptoms, such as nasal congestion or cough that would be causing his sore throat.  He has a history of allergies, but they have not been bad.  Advised him to take his allergy medications.  Additionally he has had heartburn, but has recently upped his dose of omeprazole from 20 mg to 40 mg he has only been on this for several days.  If sore throat does not improve in one week, advised follow-up with ENT  - Streptococcus A Rapid Screen w/Reflex to PCR - Clinic Collect  - Group A Streptococcus PCR Throat Swab        Return in about 1 week (around 8/18/2024), or if symptoms worsen or fail to improve.    Diagnosis and treatment plan was reviewed with patient and/or family.   We went over any labs or imaging. Discussed worsening symptoms or little to no relief despite treatment plan to follow-up with PCP or return to clinic.  Patient verbalizes understanding. All questions were addressed and answered.     Maddie Mcconnell PA-C  [facility]RG[facility] [place]    CHIEF COMPLAINT:   Chief Complaint   Patient presents with    Urgent Care     Sore throat x 1 week.      Shira Marcos is a 54 year old male who presents to clinic today for evaluation of sore throat. Symptoms started about one week ago. Feels that his tongue is weak and fatigued.   No fever or chills. He has a history of allergies, but they have not been bad. No mild congestion.     When he was brushing his teeth this AM, he saw a little blood in the sink.     He has a history of acid problems, taking omeprazole. He always has heart burn. No sour taste in his throat.       Past Medical History:   Diagnosis Date    Adhesive capsulitis of left shoulder 7/1/2020    Allergic rhinitis, cause unspecified     Allergic rhinitis    Essential hypertension, benign     History of blood transfusion     Other chronic allergic conjunctivitis      Allergic conjunctivitis    Tubular adenoma x3 09/20/2019    Surveillance colonoscopy recommended 9/2022     Past Surgical History:   Procedure Laterality Date    CLOSED REDUCTION, PERCUTANEOUS PINNING FINGER, COMBINED  5/25/2012    Procedure:COMBINED CLOSED REDUCTION, PERCUTANEOUS PINNING FINGER; Closed reductionand fixation by pinning of Right Thumb fracture      COLONOSCOPY N/A 8/25/2014    Procedure: COMBINED COLONOSCOPY, SINGLE BIOPSY/POLYPECTOMY BY BIOPSY;  Surgeon: Misael Jarvis MD;  Location: [facility]    COLONOSCOPY N/A 9/20/2019    Procedure: COLONOSCOPY, WITH POLYPECTOMY AND BIOPSY;  Surgeon: Fred Edge MD;  [facility]    COLONOSCOPY N/A 12/4/2023    Procedure: COLONOSCOPY, FLEXIBLE, WITH LESION REMOVAL USING SNARE;  Surgeon: Aj Ames MD;  Location: [facility]    DENTAL SURGERY      ESOPHAGOSCOPY, GASTROSCOPY, DUODENOSCOPY (EGD), COMBINED  6/17/2013    Procedure: COMBINED ESOPHAGOSCOPY, GASTROSCOPY, DUODENOSCOPY (EGD), BIOPSY SINGLE OR MULTIPLE;;  Surgeon: Benjy Marinelli MD;  Location: [facility]    ESOPHAGOSCOPY, GASTROSCOPY, DUODENOSCOPY (EGD), COMBINED N/A 8/25/2014    Procedure: COMBINED ESOPHAGOSCOPY, GASTROSCOPY, DUODENOSCOPY (EGD), BIOPSY SINGLE OR MULTIPLE;  Surgeon: Misael Jarvis MD;  Location: [facility]    LASIK BILATERAL      thumb surgery      wisdom teeth removed  8-2014     Social History     Tobacco Use    Smoking status: Never     Passive exposure: Never    Smokeless tobacco: Never   Substance Use Topics    Alcohol use: Yes     Comment: couple drinks a week     Current Outpatient Medications   Medication Sig Dispense Refill    aspirin (ASA) 81 MG EC tablet Take 1 tablet (81 mg) by mouth daily      atorvastatin (LIPITOR) 20 MG tablet Take 1 tablet (20 mg) by mouth daily 90 tablet 3    baclofen (LIORESAL) 10 MG tablet Take 0.5 tablets (5 mg) by mouth 2 times daily 90 tablet 1    cyclobenzaprine (FLEXERIL) 5 MG tablet Take 1 tablet (5 mg) by mouth nightly as  "needed for muscle spasms 60 tablet 1    esomeprazole (NEXIUM) 20 MG DR capsule Take 1 capsule (20 mg) by mouth every morning (before breakfast) Take 30-60 minutes before eating. 90 capsule 11    lisinopril (ZESTRIL) 20 MG tablet Take 1 tablet (20 mg) by mouth daily 90 tablet 3    omeprazole (PRILOSEC) 20 MG DR capsule Take 2 capsules (40 mg) by mouth daily 180 capsule 3    acetaminophen (TYLENOL) 325 MG tablet Take 325-650 mg by mouth every 6 hours as needed for mild pain      albuterol (PROAIR HFA/PROVENTIL HFA/VENTOLIN HFA) 108 (90 Base) MCG/ACT inhaler INHALE 2 PUFFS INTO THE LUNGS EVERY 6 HOURS AS NEEDED FOR SHORTNESS OF BREATH, WHEEZING OR COUGH (Patient not taking: Reported on 8/11/2024) 18 g 4    fluticasone (FLONASE) 50 MCG/ACT nasal spray Spray 1 spray into both nostrils daily      ibuprofen (ADVIL/MOTRIN) 200 MG tablet Take 400 mg by mouth every 4 hours as needed for mild pain      loratadine (CLARITIN REDITABS) 10 MG dispersible tablet Take 10 mg by mouth as needed       oxybutynin (DITROPAN) 5 MG tablet Take 1 tablet (5 mg) by mouth 3 times daily (Patient not taking: Reported on 8/11/2024) 30 tablet 3     No current facility-administered medications for this visit.     Allergies   Allergen Reactions    Dust Mites     Grass     Mold     No Known Drug Allergy     Trees        10 point ROS of systems were all negative except for pertinent positives noted in my HPI.      Exam:   /86   Pulse 83   Temp 97.7  F (36.5  C) (Temporal)   Resp 15   Ht 1.727 m (5' 8\")   Wt 87.5 kg (193 lb)   SpO2 98%   BMI 29.35 kg/m    Constitutional: healthy, alert and no distress  Head: Normocephalic, atraumatic.  Eyes: conjunctiva clear, no drainage  ENT: TMs clear and shiny arcelia, nasal mucosa pink and moist, throat erythematous without trismus or drooling.   Neck: neck is supple, no cervical lymphadenopathy or nuchal rigidity  Cardiovascular: RRR  Respiratory: CTA bilaterally, no rhonchi or rales  Skin: no " rashes  Neurologic: Speech clear, gait normal. Moves all extremities.    Results for orders placed or performed in visit on 08/11/24   CBC with platelets     Status: Normal   Result Value Ref Range    WBC Count 5.9 4.0 - 11.0 10e3/uL    RBC Count 5.42 4.40 - 5.90 10e6/uL    Hemoglobin 15.8 13.3 - 17.7 g/dL    Hematocrit 47.6 40.0 - 53.0 %    MCV 88 78 - 100 fL    MCH 29.2 26.5 - 33.0 pg    MCHC 33.2 31.5 - 36.5 g/dL    RDW 11.6 10.0 - 15.0 %    Platelet Count 206 150 - 450 10e3/uL   Streptococcus A Rapid Screen w/Reflex to PCR - Clinic Collect     Status: Normal    Specimen: Throat; Swab   Result Value Ref Range    Group A Strep antigen Negative Negative

## 2024-08-12 LAB — GROUP A STREP BY PCR: NOT DETECTED

## 2024-08-14 ENCOUNTER — TELEPHONE (OUTPATIENT)
Dept: OTOLARYNGOLOGY | Facility: CLINIC | Age: 54
End: 2024-08-14
Payer: COMMERCIAL

## 2024-08-14 NOTE — TELEPHONE ENCOUNTER
This encounter is being sent to inform the clinic that this patient has a referral from Maddie Mcconnell PA-C  for the diagnoses of THROAT PAIN and has requested that this patient be seen within 1-2 WEEKS and/or with ANY PROVIDER.  Based on the availability of our provider(s), we are unable to accommodate this request.    Were all sites offered this patient?  Yes    Does scheduling algorithm request to schedule next available?  Patient has been scheduled for the first available opening with LOPEZ HERNÁNDEZ on 11/5.  We have informed the patient that the clinic will review their referral and reach out if a sooner appointment is medically necessary.     JJ BELL

## 2024-09-18 NOTE — TELEPHONE ENCOUNTER
"FUTURE VISIT INFORMATION      FUTURE VISIT INFORMATION:  Date: 11/5/24  Time: 9 AM  Location: [facility] [place] [facility] [place]  REFERRAL INFORMATION:  Referring provider:    Referring providers clinic:  [facility]   Reason for visit/diagnosis:  Throat pain [R07.0]  REF BY Maddie Mcconnell PA-C  RECS IN [facility]  CONF LOC  SCHED W/PT     RECORDS REQUESTED FROM      Clinic name Comments Records Status Imaging Status   [facility]  8/11/24 note- Maddie Mcconnell PA-C [facility]    [facility] 10/30/18 XR chest Epic PACS           \"Please notify/message CSS if patient completed outside imaging prior to scheduled appointment and/or any outside records that might have been missed at pre visit -Thanks\"  "

## 2024-11-05 ENCOUNTER — PRE VISIT (OUTPATIENT)
Dept: OTOLARYNGOLOGY | Facility: CLINIC | Age: 54
End: 2024-11-05

## 2025-02-06 ENCOUNTER — TRANSFERRED RECORDS (OUTPATIENT)
Dept: HEALTH INFORMATION MANAGEMENT | Facility: CLINIC | Age: 55
End: 2025-02-06
Payer: COMMERCIAL

## 2025-03-30 DIAGNOSIS — K21.9 GASTROESOPHAGEAL REFLUX DISEASE WITHOUT ESOPHAGITIS: ICD-10-CM

## 2025-03-31 RX ORDER — BACLOFEN 10 MG/1
5 TABLET ORAL 2 TIMES DAILY
Qty: 90 TABLET | Refills: 1 | Status: SHIPPED | OUTPATIENT
Start: 2025-03-31

## 2025-07-02 ENCOUNTER — TRANSFERRED RECORDS (OUTPATIENT)
Dept: HEALTH INFORMATION MANAGEMENT | Facility: CLINIC | Age: 55
End: 2025-07-02

## 2025-07-13 SDOH — HEALTH STABILITY: PHYSICAL HEALTH: ON AVERAGE, HOW MANY MINUTES DO YOU ENGAGE IN EXERCISE AT THIS LEVEL?: 30 MIN

## 2025-07-13 SDOH — HEALTH STABILITY: PHYSICAL HEALTH: ON AVERAGE, HOW MANY DAYS PER WEEK DO YOU ENGAGE IN MODERATE TO STRENUOUS EXERCISE (LIKE A BRISK WALK)?: 5 DAYS

## 2025-07-13 ASSESSMENT — SOCIAL DETERMINANTS OF HEALTH (SDOH): HOW OFTEN DO YOU GET TOGETHER WITH FRIENDS OR RELATIVES?: ONCE A WEEK

## 2025-07-14 ENCOUNTER — VIRTUAL VISIT (OUTPATIENT)
Dept: GASTROENTEROLOGY | Facility: CLINIC | Age: 55
End: 2025-07-14
Payer: COMMERCIAL

## 2025-07-14 VITALS — BODY MASS INDEX: 30.31 KG/M2 | WEIGHT: 200 LBS | HEIGHT: 68 IN

## 2025-07-14 DIAGNOSIS — K30 FUNCTIONAL DYSPEPSIA: Primary | ICD-10-CM

## 2025-07-14 PROCEDURE — 1126F AMNT PAIN NOTED NONE PRSNT: CPT | Mod: 95 | Performed by: PHYSICIAN ASSISTANT

## 2025-07-14 PROCEDURE — 98005 SYNCH AUDIO-VIDEO EST LOW 20: CPT | Performed by: PHYSICIAN ASSISTANT

## 2025-07-14 RX ORDER — ESOMEPRAZOLE MAGNESIUM 40 MG/1
40 CAPSULE, DELAYED RELEASE ORAL
Qty: 90 CAPSULE | Refills: 3 | Status: SHIPPED | OUTPATIENT
Start: 2025-07-14 | End: 2026-07-09

## 2025-07-14 ASSESSMENT — PAIN SCALES - GENERAL: PAINLEVEL_OUTOF10: NO PAIN (0)

## 2025-07-14 NOTE — NURSING NOTE
Current patient location: [facility] [address] [facility] [address]    Is the patient currently in the state of [place]? YES    Visit mode: VIDEO    If the visit is dropped, the patient can be reconnected by:VIDEO VISIT: Send to e-mail at: [email or web address]    Will anyone else be joining the visit? NO  (If patient encounters technical issues they should call [phone]    Are changes needed to the allergy or medication list? No    Are refills needed on medications prescribed by this physician? NO    Rooming Documentation:  Not applicable    Reason for visit: JOSE ROBERTO AGUILAR

## 2025-07-14 NOTE — PROGRESS NOTES
Virtual Visit Details    Type of service:  Video Visit     Originating Location (pt. Location): Home    Distant Location (provider location):  Off-site  Platform used for Video Visit: [facility] CLINIC VISIT    CC/REFERRING MD:  Alexa Cordero      ASSESSMENT/PLAN:  #functional dyspepsia  #GERD  Patient has continued with esomeprazole 40mg daily, and will take omeprazole 40mg at times if he feels the esomeprazole is not working. We did discuss normal bravo results in 2018, as well as the diagnosis of likely functional dyspepsia vs. Reflux hypersensitivity. He did not follow up with GI health psychology, and is now open to this.   --continue esomeprazole 40mg daily.   --GI health psych referral.     # right sided abdominal pain - work-up in the past included ultrasound which was normal - previous CT scan which was unrevealing. Has not tried topical therapies, can trial lidoderm patches OTC.      # history of colon polyps - repeat c-scope [id number]        RTC 1 year.    Thank you for this consultation.  It was a pleasure to participate in the care of this patient; please contact us with any further questions.       20 minutes spent on the date of the encounter doing chart review, review of outside records, patient visit, and documentation    This note was created with voice recognition software, and while reviewed for accuracy, typos may remain.    Dinora Pathak PA-C  Division of Gastroenterology, Hepatology and Nutrition  [facility]  56 y/o M presents for follow up of GERD.     Patient was last evaluated by Dr. Cordero in July 2023, please see visit details below:  Hemant presents today for a video visit for follow-up.  Symptoms are pretty stable - comes and goes - has been trying to taper off the omeprazole - does add famotidine as needed.  Tries to go 1-2 days without taking it.  Remains on the baclofen - taking twice daily.     Has had recurring right sided  pain - comes and goes.  Did have a CT scan for this which was unrevealing aside from possible possible cystitis. Pain comes and goes randomly - worse with movement and certain positions. One time did seem to get worse when drinking with a latte but pain was already present when he drank it.  No associated GI symptoms (nausea, vomiting, changes in bowels, etc).  Eating and bowel movements are normal during these episodes.    7/5/24, initial visit with patient, Dinora Pathak PA-C:   Patient switched from esomperazole 20mg to omeprazole 20mg daily, about two weeks ago as he felt esomeprazole wasn't working as well anymore. Symptoms are relatively stable - will have atypical chest discomfort usually a couple times as needed. Does take pepcid as needed, but doesn't take it that frequently, feels he could take this more often. Did feel mylanta did provide temporary relief. Does take baclofen twice daily.     Will continue to have R sided abdominal pain that comes and goes - does not feel there is a set pattern. In the last two months has had it a couple of times. Pain will gradually build up when it comes on. Can last a few days to a week or so. If he bends a certain way can feel it more.     1/10/25:  Patient did have some improvement with esomeprazole 40mg. Occasionally he will have worsening symptoms, and will switch back to omeprazole 40mg for a few days. Continues baclofen and pepcid. Denies abdominal pain, nausea, or vomiting. Does have right flank pain - that's been chronic. Denies odynophagia or dysphagia.     7/14/25:  Patient has continued the esomeprazole 40mg daily -- generally symptoms are well controlled, he can occasion can experience some discomfort in his chest. He also notes that when he uses his CPAP can have some chest discomfort. Denies heartburn or regurgitation.  He continues the baclofen, will only use pepcid as needed. Denies abdominal pain, nause,a or vomiting. Continues to have right sided flank  pain with is chronic.         ROS:    No fevers or chills  No weight loss  No shortness of breath or wheezing  No chest pain or pressure  No odynophagia or dysphagia  No BRBPR, hematochezia, melena  No anxiety or depression      PROBLEM LIST  Patient Active Problem List    Diagnosis Date Noted    Adhesive capsulitis of right shoulder 08/30/2022     Priority: Medium    Hip pain, right 11/16/2021     Priority: Medium    PATTI (obstructive sleep apnea) 12/03/2020     Priority: Medium     HSAT  11/16/2020  Weight 198 lbs BMI 29.18  AHI 25.9, Lowest O2 SAT 75%      Overweight (BMI 25.0-29.9) 10/09/2020     Priority: Medium    Eosinophilic esophagitis 04/11/2018     Priority: Medium    Testis pain 10/29/2014     Priority: Medium    Chronic abdominal pain 03/13/2014     Priority: Medium    Hyperlipidemia LDL goal <130 05/02/2013     Priority: Medium    Hypertension goal BP (blood pressure) < 140/90 09/01/2011     Priority: Medium    Allergic rhinitis 03/18/2005     Priority: Medium     Allergic rhinitis  Problem list name updated by automated process. Provider to review      Other chronic allergic conjunctivitis 03/18/2005     Priority: Medium     Allergic conjunctivitis         PERTINENT PAST MEDICAL HISTORY:    Past Medical History:   Diagnosis Date    Adhesive capsulitis of left shoulder 7/1/2020    Allergic rhinitis, cause unspecified     Allergic rhinitis    Essential hypertension, benign     History of blood transfusion     Other chronic allergic conjunctivitis     Allergic conjunctivitis    Tubular adenoma x3 09/20/2019    Surveillance colonoscopy recommended 9/2022       PREVIOUS SURGERIES:    Past Surgical History:   Procedure Laterality Date    CLOSED REDUCTION, PERCUTANEOUS PINNING FINGER, COMBINED  5/25/2012    Procedure:COMBINED CLOSED REDUCTION, PERCUTANEOUS PINNING FINGER; Closed reductionand fixation by pinning of Right Thumb fracture      COLONOSCOPY N/A 8/25/2014    Procedure: COMBINED COLONOSCOPY, SINGLE  BIOPSY/POLYPECTOMY BY BIOPSY;  Surgeon: Misael Jarvis MD;  Location:  GI    COLONOSCOPY N/A 9/20/2019    Procedure: COLONOSCOPY, WITH POLYPECTOMY AND BIOPSY;  Surgeon: Fred Edge MD;  [facility]    COLONOSCOPY N/A 12/4/2023    Procedure: COLONOSCOPY, FLEXIBLE, WITH LESION REMOVAL USING SNARE;  Surgeon: Aj Ames MD;  Location:  GI    DENTAL SURGERY      ESOPHAGOSCOPY, GASTROSCOPY, DUODENOSCOPY (EGD), COMBINED  6/17/2013    Procedure: COMBINED ESOPHAGOSCOPY, GASTROSCOPY, DUODENOSCOPY (EGD), BIOPSY SINGLE OR MULTIPLE;;  Surgeon: Benjy Marinelli MD;  Location: [facility]    ESOPHAGOSCOPY, GASTROSCOPY, DUODENOSCOPY (EGD), COMBINED N/A 8/25/2014    Procedure: COMBINED ESOPHAGOSCOPY, GASTROSCOPY, DUODENOSCOPY (EGD), BIOPSY SINGLE OR MULTIPLE;  Surgeon: Misael Jarvis MD;  Location: [facility]    LASIK BILATERAL      thumb surgery      wisdom teeth removed  8-2014       PREVIOUS ENDOSCOPY:  C-scope (12/2023): Six sessile polyps were found in the descending colon and transverse        colon. The polyps were 1 to 4 mm in size. Some polyps were removed with        a cold snare, and others were removed with a jumbo forceps.   Final Diagnosis   COLON, TRANSVERSE/DESCENDING, POLYPS X6:   - Tubular adenoma(s); negative for high-grade dysplasia     ALLERGIES:     Allergies   Allergen Reactions    Dust Mites     Grass     Mold     No Known Drug Allergy     Trees        PERTINENT MEDICATIONS:    Current Outpatient Medications:     acetaminophen (TYLENOL) 325 MG tablet, Take 325-650 mg by mouth every 6 hours as needed for mild pain, Disp: , Rfl:     albuterol (PROAIR HFA/PROVENTIL HFA/VENTOLIN HFA) 108 (90 Base) MCG/ACT inhaler, INHALE 2 PUFFS INTO THE LUNGS EVERY 6 HOURS AS NEEDED FOR SHORTNESS OF BREATH, WHEEZING OR COUGH (Patient not taking: Reported on 8/11/2024), Disp: 18 g, Rfl: 4    aspirin (ASA) 81 MG EC tablet, Take 1 tablet (81 mg) by mouth daily, Disp: , Rfl:     atorvastatin  (LIPITOR) 20 MG tablet, Take 1 tablet (20 mg) by mouth daily, Disp: 90 tablet, Rfl: 3    baclofen (LIORESAL) 10 MG tablet, TAKE 0.5 TABLETS (5 MG) BY MOUTH 2 TIMES DAILY, Disp: 90 tablet, Rfl: 1    cyclobenzaprine (FLEXERIL) 5 MG tablet, Take 1 tablet (5 mg) by mouth nightly as needed for muscle spasms, Disp: 60 tablet, Rfl: 1    esomeprazole (NEXIUM) 20 MG DR capsule, Take 1 capsule (20 mg) by mouth every morning (before breakfast) Take 30-60 minutes before eating., Disp: 90 capsule, Rfl: 11    fluticasone (FLONASE) 50 MCG/ACT nasal spray, Spray 1 spray into both nostrils daily, Disp: , Rfl:     ibuprofen (ADVIL/MOTRIN) 200 MG tablet, Take 400 mg by mouth every 4 hours as needed for mild pain, Disp: , Rfl:     lisinopril (ZESTRIL) 20 MG tablet, Take 1 tablet (20 mg) by mouth daily, Disp: 90 tablet, Rfl: 3    loratadine (CLARITIN REDITABS) 10 MG dispersible tablet, Take 10 mg by mouth as needed , Disp: , Rfl:     omeprazole (PRILOSEC) 20 MG DR capsule, Take 2 capsules (40 mg) by mouth daily, Disp: 180 capsule, Rfl: 3    oxybutynin (DITROPAN) 5 MG tablet, Take 1 tablet (5 mg) by mouth 3 times daily (Patient not taking: Reported on 8/11/2024), Disp: 30 tablet, Rfl: 3    SOCIAL HISTORY:    Social History     Socioeconomic History    Marital status:      Spouse name: Not on file    Number of children: 2    Years of education: Not on file    Highest education level: Not on file   Occupational History    Occupation:    Tobacco Use    Smoking status: Never     Passive exposure: Never    Smokeless tobacco: Never   Vaping Use    Vaping status: Never Used   Substance and Sexual Activity    Alcohol use: Yes     Comment: couple drinks a week    Drug use: No    Sexual activity: Yes     Partners: Female     Birth control/protection: Condom   Other Topics Concern    Parent/sibling w/ CABG, MI or angioplasty before 65F 55M? No   Social History Narrative    Dairy/d 2-3 servings/d     Caffeine 1-2 servings/d     Exercise 2 x week-gym regularly treadmill/hockey    Sunscreen used - No    Seatbelts used - Yes    Working smoke/CO detectors in the home - Yes    Guns stored in the home - No    Self Breast Exams - NA    Self Testicular Exam - yes    Eye Exam up to date - Yes   2008    Dental Exam up to date - Yes  2008    Pap Smear up to date - NA    Mammogram up to date - NA    PSA up to date - No    Dexa Scan up to date - NA    Flex Sig / Colonoscopy up to date - No    Immunizations up to date - 2006 td    Abuse: Current or Past(Physical, Sexual or Emotional)- No    Do you feel safe in your environment - Yes    Updated 5/2009     Social Drivers of Health     Financial Resource Strain: Low Risk  (7/6/2024)    Financial Resource Strain     Within the past 12 months, have you or your family members you live with been unable to get utilities (heat, electricity) when it was really needed?: No   Food Insecurity: Low Risk  (7/6/2024)    Food Insecurity     Within the past 12 months, did you worry that your food would run out before you got money to buy more?: No     Within the past 12 months, did the food you bought just not last and you didn t have money to get more?: No   Transportation Needs: Low Risk  (7/6/2024)    Transportation Needs     Within the past 12 months, has lack of transportation kept you from medical appointments, getting your medicines, non-medical meetings or appointments, work, or from getting things that you need?: No   Physical Activity: Sufficiently Active (7/6/2024)    Exercise Vital Sign     Days of Exercise per Week: 5 days     Minutes of Exercise per Session: 30 min   Stress: No Stress Concern Present (7/6/2024)    [employer] - Occupational Stress Questionnaire     Feeling of Stress : Not at all   Social Connections: Unknown (7/6/2024)    Social Connection and Isolation Panel [NHANES]     Frequency of Communication with Friends and Family: Not on file     Frequency of Social  Gatherings with Friends and Family: Once a week     Attends Confucianist Services: Not on file     Active Member of Clubs or Organizations: Not on file     Attends Club or Organization Meetings: Not on file     Marital Status: Not on file   Interpersonal Safety: Low Risk  (7/11/2024)    Interpersonal Safety     Do you feel physically and emotionally safe where you currently live?: Yes     Within the past 12 months, have you been hit, slapped, kicked or otherwise physically hurt by someone?: No     Within the past 12 months, have you been humiliated or emotionally abused in other ways by your partner or ex-partner?: No   Housing Stability: High Risk (7/6/2024)    Housing Stability     Do you have housing? : No     Are you worried about losing your housing?: No       FAMILY HISTORY:    Family History   Problem Relation Age of Onset    Breast Cancer Mother     Arthritis Father     Genitourinary Problems Father         stones    Cancer Father         skin    C.A.D. Father         stents/age late 60s    Coronary Artery Disease Father         Stents    Prostate Cancer Father         late 50s    Snoring Father     Asthma Sister     Depression Sister     Cancer Paternal Grandfather         ? type    Eye Disorder Paternal Grandfather         cateracts       Past/family/social history reviewed and no changes    PHYSICAL EXAMINATION:  General: Patient appears well in no acute distress.   Skin: No visualized rash or lesions on visualized skin  Eyes: EOMI, no erythema, sclera icterus or discharge noted  Resp: Appears to be breathing comfortably without accessory muscle usage, speaking in full sentences, no cough  MSK: Appears to have normal range of motion based on visualized movements  Neurologic: No apparent tremors, facial movements symmetric  Psych: normal affect , alert and oriented

## 2025-07-14 NOTE — PATIENT INSTRUCTIONS
It was a pleasure meeting with you today and discussing your healthcare plan. Below is a summary of what we covered:    --continue esomeprazole 40mg daily   --A referral was placed to our GI psychologist,  Please call [phone]  to schedule.    Follow up in 1 year.      Please see below for any additional questions and scheduling guidelines.    Sign up for [facility]: [facility] patient portal serves as a secure platform for accessing your medical records from the [facility]. Additionally, [employer] facilitates easy, timely, and secure messaging with your care team. If you have not signed up, you may do so by using the provided code or calling [phone].    Coordinating your care after your visit:  There are multiple options for scheduling your follow-up care based on your provider's recommendation.    How do I schedule a follow-up clinic appointment:   After your appointment, you may receive scheduling assistance with the Clinic Coordinators by having a seat in the waiting room and a Clinic Coordinator will call you up to schedule.  Virtual visits or after you leave the clinic:  Your provider has placed a follow-up order in the [facility] portal for scheduling your return appointment. A member of the scheduling team will contact you to schedule.  [employer] Scheduling: Timely scheduling through [employer] is advised to ensure appointment availability.   Call to schedule: You may schedule your follow-up appointment(s) by calling [phone], option 1.    How do I schedule my endoscopy or colonoscopy procedure:  If a procedure, such as a colonoscopy or upper endoscopy was ordered by your provider, the scheduling team will contact you to schedule this procedure. Or you may choose to call to schedule at   [phone], option 2.  Please allow 20-30 minutes when scheduling a procedure.    How do I get my blood work done? To get your blood work done, you need to schedule a lab appointment at an [facility]  Laboratory. There are multiple ways to schedule:   At the clinic: The Clinic Coordinator you meet after your visit can help you schedule a lab appointment.   [facility] scheduling: [facility] offers online lab scheduling at all [facility] laboratory locations.   Call to schedule: You can call [phone] to schedule your lab appointment.    How do I schedule my imaging study: To schedule imaging studies, such as CT scans, ultrasounds, MRIs, or X-rays, contact Imaging Services at [phone].    How do I schedule a referral to another doctor: If your provider recommended a referral to another specialist(s), the referral order was placed by your provider. You will receive a phone call to schedule this referral, or you may choose to call the number attached to the referral to self-schedule.    For Post-Visit Question(s):  For any inquiries following today's visit:  Please utilize [facility] messaging and allow 48 hours for reply or contact the Call Center during normal business hours at [phone], option 3.  For Emergent After-hours questions, contact the On-Call GI Fellow through the [facility]  at [phone].  In addition, you may contact your Nurse directly using the provided contact information.    Test Results:  Test results will be accessible via [facility] in compliance with the 21st Century Cures Act. This means that your results will be available to you at the same time as your provider. Often you may see your results before your provider does. Results are reviewed by staff within two weeks with communication follow-up. Results may be released in the patient portal prior to your care team review.    Prescription Refill(s):  Medication prescribed by your provider will be addressed during your visit. For future refills, please coordinate with your pharmacy. If you have not had a recent clinic visit or routine labs, for your safety, your provider may not be able to refill your prescription.

## 2025-07-15 ENCOUNTER — OFFICE VISIT (OUTPATIENT)
Dept: FAMILY MEDICINE | Facility: CLINIC | Age: 55
End: 2025-07-15
Attending: FAMILY MEDICINE
Payer: COMMERCIAL

## 2025-07-15 VITALS
BODY MASS INDEX: 30.03 KG/M2 | HEART RATE: 64 BPM | TEMPERATURE: 98.9 F | HEIGHT: 68 IN | RESPIRATION RATE: 16 BRPM | SYSTOLIC BLOOD PRESSURE: 128 MMHG | OXYGEN SATURATION: 97 % | DIASTOLIC BLOOD PRESSURE: 88 MMHG | WEIGHT: 198.12 LBS

## 2025-07-15 DIAGNOSIS — Z00.00 ROUTINE GENERAL MEDICAL EXAMINATION AT A HEALTH CARE FACILITY: Primary | ICD-10-CM

## 2025-07-15 DIAGNOSIS — L65.9 ALOPECIA: ICD-10-CM

## 2025-07-15 DIAGNOSIS — G89.4 CHRONIC PAIN SYNDROME: ICD-10-CM

## 2025-07-15 DIAGNOSIS — R07.9 CHEST PAIN, UNSPECIFIED TYPE: ICD-10-CM

## 2025-07-15 DIAGNOSIS — F41.9 ANXIETY: ICD-10-CM

## 2025-07-15 DIAGNOSIS — K21.9 GASTROESOPHAGEAL REFLUX DISEASE WITHOUT ESOPHAGITIS: ICD-10-CM

## 2025-07-15 DIAGNOSIS — K20.0 EOSINOPHILIC ESOPHAGITIS: ICD-10-CM

## 2025-07-15 DIAGNOSIS — R35.0 URINARY FREQUENCY: ICD-10-CM

## 2025-07-15 DIAGNOSIS — E78.5 HYPERLIPIDEMIA LDL GOAL <130: ICD-10-CM

## 2025-07-15 DIAGNOSIS — I10 HYPERTENSION GOAL BP (BLOOD PRESSURE) < 140/90: ICD-10-CM

## 2025-07-15 DIAGNOSIS — Z12.5 SCREENING FOR PROSTATE CANCER: ICD-10-CM

## 2025-07-15 DIAGNOSIS — Z80.42 FAMILY HISTORY OF PROSTATE CANCER: ICD-10-CM

## 2025-07-15 DIAGNOSIS — M79.10 MUSCULAR ACHES: ICD-10-CM

## 2025-07-15 LAB
ANION GAP SERPL CALCULATED.3IONS-SCNC: 11 MMOL/L (ref 7–15)
BUN SERPL-MCNC: 19.9 MG/DL (ref 6–20)
CALCIUM SERPL-MCNC: 10.2 MG/DL (ref 8.8–10.4)
CHLORIDE SERPL-SCNC: 102 MMOL/L (ref 98–107)
CHOLEST SERPL-MCNC: 176 MG/DL
CK SERPL-CCNC: 84 U/L (ref 39–308)
CREAT SERPL-MCNC: 1.13 MG/DL (ref 0.67–1.17)
EGFRCR SERPLBLD CKD-EPI 2021: 76 ML/MIN/1.73M2
ERYTHROCYTE [SEDIMENTATION RATE] IN BLOOD BY WESTERGREN METHOD: 7 MM/HR (ref 0–20)
FASTING STATUS PATIENT QL REPORTED: YES
FASTING STATUS PATIENT QL REPORTED: YES
GLUCOSE SERPL-MCNC: 105 MG/DL (ref 70–99)
HCO3 SERPL-SCNC: 28 MMOL/L (ref 22–29)
HDLC SERPL-MCNC: 45 MG/DL
LDLC SERPL CALC-MCNC: 93 MG/DL
NONHDLC SERPL-MCNC: 131 MG/DL
POTASSIUM SERPL-SCNC: 4.7 MMOL/L (ref 3.4–5.3)
PSA SERPL DL<=0.01 NG/ML-MCNC: 1.43 NG/ML (ref 0–3.5)
SODIUM SERPL-SCNC: 141 MMOL/L (ref 135–145)
TRIGL SERPL-MCNC: 192 MG/DL

## 2025-07-15 PROCEDURE — 36415 COLL VENOUS BLD VENIPUNCTURE: CPT | Performed by: FAMILY MEDICINE

## 2025-07-15 PROCEDURE — 3074F SYST BP LT 130 MM HG: CPT | Performed by: FAMILY MEDICINE

## 2025-07-15 PROCEDURE — 82550 ASSAY OF CK (CPK): CPT | Performed by: FAMILY MEDICINE

## 2025-07-15 PROCEDURE — 80048 BASIC METABOLIC PNL TOTAL CA: CPT | Performed by: FAMILY MEDICINE

## 2025-07-15 PROCEDURE — G0103 PSA SCREENING: HCPCS | Performed by: FAMILY MEDICINE

## 2025-07-15 PROCEDURE — 80061 LIPID PANEL: CPT | Performed by: FAMILY MEDICINE

## 2025-07-15 PROCEDURE — 99396 PREV VISIT EST AGE 40-64: CPT | Performed by: FAMILY MEDICINE

## 2025-07-15 PROCEDURE — 99214 OFFICE O/P EST MOD 30 MIN: CPT | Mod: 25 | Performed by: FAMILY MEDICINE

## 2025-07-15 PROCEDURE — 85652 RBC SED RATE AUTOMATED: CPT | Performed by: FAMILY MEDICINE

## 2025-07-15 PROCEDURE — G2211 COMPLEX E/M VISIT ADD ON: HCPCS | Performed by: FAMILY MEDICINE

## 2025-07-15 PROCEDURE — 86038 ANTINUCLEAR ANTIBODIES: CPT | Performed by: FAMILY MEDICINE

## 2025-07-15 PROCEDURE — 3079F DIAST BP 80-89 MM HG: CPT | Performed by: FAMILY MEDICINE

## 2025-07-15 RX ORDER — LISINOPRIL 20 MG/1
20 TABLET ORAL DAILY
Qty: 90 TABLET | Refills: 3 | Status: SHIPPED | OUTPATIENT
Start: 2025-07-15

## 2025-07-15 RX ORDER — ATORVASTATIN CALCIUM 20 MG/1
20 TABLET, FILM COATED ORAL DAILY
Qty: 90 TABLET | Refills: 3 | Status: SHIPPED | OUTPATIENT
Start: 2025-07-15

## 2025-07-15 RX ORDER — BACLOFEN 10 MG/1
5 TABLET ORAL 2 TIMES DAILY PRN
Qty: 60 TABLET | Refills: 3 | Status: SHIPPED | OUTPATIENT
Start: 2025-07-15

## 2025-07-15 RX ORDER — OXYBUTYNIN CHLORIDE 5 MG/1
5 TABLET ORAL 3 TIMES DAILY
Qty: 30 TABLET | Refills: 3 | Status: SHIPPED | OUTPATIENT
Start: 2025-07-15

## 2025-07-15 NOTE — PATIENT INSTRUCTIONS
Patient Education   Preventive Care Advice   This is general advice given by our system to help you stay healthy. However, your care team may have specific advice just for you. Please talk to your care team about your preventive care needs.  Nutrition  Eat 5 or more servings of fruits and vegetables each day.  Try wheat bread, brown rice and whole grain pasta (instead of white bread, rice, and pasta).  Get enough calcium and vitamin D. Check the label on foods and aim for 100% of the RDA (recommended daily allowance).  Lifestyle  Exercise at least 150 minutes each week  (30 minutes a day, 5 days a week).  Do muscle strengthening activities 2 days a week. These help control your weight and prevent disease.  No smoking.  Wear sunscreen to prevent skin cancer.  Have a dental exam and cleaning every 6 months.  Yearly exams  See your health care team every year to talk about:  Any changes in your health.  Any medicines your care team has prescribed.  Preventive care, family planning, and ways to prevent chronic diseases.  Shots (vaccines)   HPV shots (up to age 26), if you've never had them before.  Hepatitis B shots (up to age 59), if you've never had them before.  COVID-19 shot: Get this shot when it's due.  Flu shot: Get a flu shot every year.  Tetanus shot: Get a tetanus shot every 10 years.  Pneumococcal, hepatitis A, and RSV shots: Ask your care team if you need these based on your risk.  Shingles shot (for age 50 and up)  General health tests  Diabetes screening:  Starting at age 35, Get screened for diabetes at least every 3 years.  If you are younger than age 35, ask your care team if you should be screened for diabetes.  Cholesterol test: At age 39, start having a cholesterol test every 5 years, or more often if advised.  Bone density scan (DEXA): At age 50, ask your care team if you should have this scan for osteoporosis (brittle bones).  Hepatitis C: Get tested at least once in your life.  STIs (sexually  transmitted infections)  Before age 24: Ask your care team if you should be screened for STIs.  After age 24: Get screened for STIs if you're at risk. You are at risk for STIs (including HIV) if:  You are sexually active with more than one person.  You don't use condoms every time.  You or a partner was diagnosed with a sexually transmitted infection.  If you are at risk for HIV, ask about PrEP medicine to prevent HIV.  Get tested for HIV at least once in your life, whether you are at risk for HIV or not.  Cancer screening tests  Cervical cancer screening: If you have a cervix, begin getting regular cervical cancer screening tests starting at age 21.  Breast cancer scan (mammogram): If you've ever had breasts, begin having regular mammograms starting at age 40. This is a scan to check for breast cancer.  Colon cancer screening: It is important to start screening for colon cancer at age 45.  Have a colonoscopy test every 10 years (or more often if you're at risk) Or, ask your provider about stool tests like a FIT test every year or Cologuard test every 3 years.  To learn more about your testing options, visit:   .  For help making a decision, visit:   https://bit.ly/hl04817.  Prostate cancer screening test: If you have a prostate, ask your care team if a prostate cancer screening test (PSA) at age 55 is right for you.  Lung cancer screening: If you are a current or former smoker ages 50 to 80, ask your care team if ongoing lung cancer screenings are right for you.  For informational purposes only. Not to replace the advice of your health care provider. Copyright   2023 [employer]. All rights reserved. Clinically reviewed by the [facility] Transitions Program. [place] [id number] - REV 01/24.

## 2025-07-15 NOTE — PROGRESS NOTES
"Preventive Care Visit  [facility], [facility]  Jul 15, 2025      Assessment & Plan     Routine general medical examination at a health care facility  - PRIMARY CARE FOLLOW-UP SCHEDULING    Hypertension goal BP (blood pressure) < 140/90  Well controlled. Refilled lisinopril  - lisinopril (ZESTRIL) 20 MG tablet  Dispense: 90 tablet; Refill: 3  - BASIC METABOLIC PANEL    Hyperlipidemia LDL goal <130  Continue statin  - Lipid panel reflex to direct LDL Non-fasting    Gastroesophageal reflux disease without esophagitis  Eosinophilic esophagitis  Ok to continue, started by GI.   - baclofen (LIORESAL) 10 MG tablet  Dispense: 60 tablet; Refill: 3    Chest pain, unspecified type  Family history of CAD, rechecking lipids today. Significant costochondritis, but unclear why as he has a sedentary lifestyle. Long standing history of chest pain with normal EKG, exercise stress test, stress echo. Anxiety could also be contributing. See discussion below.   - atorvastatin (LIPITOR) 20 MG tablet  Dispense: 90 tablet; Refill: 3    Urinary frequency  Ok to use prn.   - oxyBUTYnin (DITROPAN) 5 MG tablet  Dispense: 30 tablet; Refill: 3    Chronic pain syndrome  Muscular aches  Alopecia  With proximal muscle aches/fatigue, alopecia, and other symptoms... will rule out inflammatory causes. Anxiety again could be contributing.   - Anti Nuclear Faith IgG by IFA with Reflex  - ESR: Erythrocyte sedimentation rate  - CK total    Screening for prostate cancer  Family history of prostate cancer  Grandfather had prostate cancer.   - PSA, screen  - PSA, screen    Anxiety  Discussed options. If prozac has too many side effects, will send lexapro 5. Titrate fluoxetine up if tolerated.   - FLUoxetine (PROZAC) 20 MG capsule  Dispense: 30 capsule; Refill: 1        BMI  Estimated body mass index is 30.13 kg/m  as calculated from the following:    Height as of this encounter: 1.727 m (5' 7.99\").    Weight as of " this encounter: 89.9 kg (198 lb 1.9 oz).   Weight management plan: Discussed healthy diet and exercise guidelines    Counseling  Appropriate preventive services were addressed with this patient via screening, questionnaire, or discussion as appropriate for fall prevention, nutrition, physical activity, Tobacco-use cessation, social engagement, weight loss and cognition.  Checklist reviewing preventive services available has been given to the patient.  Reviewed patient's diet, addressing concerns and/or questions.   Reviewed preventive health counseling, as reflected in patient instructions    Follow-up  Return in about 3 months (around 10/15/2025) for depression/anxiety.    Shira Marcos is a 55 year old, presenting for the following:  Physical           HPI           Advance Care Planning    Discussed advance care planning with patient; informed AVS has link to [facility].        7/13/2025   General Health   How would you rate your overall physical health? Good   Feel stress (tense, anxious, or unable to sleep) Only a little   (!) STRESS CONCERN      7/13/2025   Nutrition   Three or more servings of calcium each day? Yes   Diet: Regular (no restrictions)   How many servings of fruit and vegetables per day? (!) 0-1   How many sweetened beverages each day? 0-1         7/13/2025   Exercise   Days per week of moderate/strenous exercise 5 days   Average minutes spent exercising at this level 30 min         7/13/2025   Social Factors   Frequency of gathering with friends or relatives Once a week   Worry food won't last until get money to buy more No   Food not last or not have enough money for food? No   Do you have housing? (Housing is defined as stable permanent housing and does not include staying outside in a car, in a tent, in an abandoned building, in an overnight shelter, or couch-surfing.) No   Are you worried about losing your housing? No   Lack of transportation? No   Unable to get utilities  "(heat,electricity)? No   Want help with housing or utility concern? No   (!) HOUSING CONCERN PRESENT      7/13/2025   Fall Risk   Fallen 2 or more times in the past year? No   Trouble with walking or balance? No          7/13/2025   Dental   Dentist two times every year? Yes           Today's PHQ-2 Score:       1/10/2025    11:02 AM   PHQ-2 ( 1999 [employer])   Q1: Little interest or pleasure in doing things 0   Q2: Feeling down, depressed or hopeless 0   PHQ-2 Score 0         7/13/2025   Substance Use   Alcohol more than 3/day or more than 7/wk No   Do you use any other substances recreationally? No     Social History     Tobacco Use    Smoking status: Never     Passive exposure: Never    Smokeless tobacco: Never   Vaping Use    Vaping status: Never Used   Substance Use Topics    Alcohol use: Yes     Comment: couple drinks a week    Drug use: No           7/13/2025   STI Screening   New sexual partner(s) since last STI/HIV test? No   Last PSA:   PSA   Date Value Ref Range Status   09/18/2020 0.71 0 - 4 ug/L Final     Comment:     Assay Method:  Chemiluminescence using Siemens Vista analyzer     Prostate Specific Antigen Screen   Date Value Ref Range Status   02/22/2023 1.03 0.00 - 3.50 ng/mL Final     PSA Tumor Marker   Date Value Ref Range Status   11/08/2021 0.82 0.00 - 3.50 ug/L Final     ASCVD Risk   The 10-year ASCVD risk score (Oswaldo WHARTON, et al., 2019) is: 6.9%    Values used to calculate the score:      Age: 55 years      Sex: Male      Is Non- : No      Diabetic: No      Tobacco smoker: No      Systolic Blood Pressure: 128 mmHg      Is BP treated: Yes      HDL Cholesterol: 39 mg/dL      Total Cholesterol: 147 mg/dL           Reviewed and updated as needed this visit by Provider   Tobacco  Allergies  Meds  Problems  Med Hx  Surg Hx  Fam Hx                     Objective    Exam  /88   Pulse 64   Temp 98.9  F (37.2  C) (Oral)   Resp 16   Ht 1.727 m (5' 7.99\")   Wt " "89.9 kg (198 lb 1.9 oz)   SpO2 97%   BMI 30.13 kg/m     Estimated body mass index is 30.13 kg/m  as calculated from the following:    Height as of this encounter: 1.727 m (5' 7.99\").    Weight as of this encounter: 89.9 kg (198 lb 1.9 oz).    Physical Exam  GENERAL: alert and no distress  NECK: no adenopathy, no asymmetry, masses, or scars  RESP: lungs clear to auscultation - no rales, rhonchi or wheezes  CV: regular rate and rhythm, normal S1 S2, no S3 or S4, no murmur, click or rub, no peripheral edema  ABDOMEN: soft, nontender, no hepatosplenomegaly, no masses and bowel sounds normal  MS: no gross musculoskeletal defects noted, no edema        Signed Electronically by: Pastora Olivo MD    "

## 2025-07-16 LAB — ANA SER QL IF: NEGATIVE

## 2025-08-06 DIAGNOSIS — F41.9 ANXIETY: ICD-10-CM

## 2025-08-15 ENCOUNTER — TRANSFERRED RECORDS (OUTPATIENT)
Dept: HEALTH INFORMATION MANAGEMENT | Facility: CLINIC | Age: 55
End: 2025-08-15
Payer: COMMERCIAL

## (undated) DEVICE — GOWN IMPERVIOUS 2XL BLUE

## (undated) DEVICE — Device

## (undated) DEVICE — SPECIMEN CONTAINER 3OZ W/FORMALIN 59901

## (undated) DEVICE — CAPTIVATOR COLD SNARE

## (undated) DEVICE — TUBING SUCTION 12"X1/4" N612

## (undated) DEVICE — FCP BIOPSY RADIAL JAW 4 JUMBO 3.2MM CHANNEL M00513370

## (undated) DEVICE — KIT ENDO TURNOVER/PROCEDURE CARRY-ON 101822

## (undated) DEVICE — SOL WATER IRRIG 1000ML BOTTLE 2F7114

## (undated) DEVICE — KIT ENDO FIRST STEP DISINFECTANT 200ML W/POUCH EP-4

## (undated) DEVICE — SUCTION MANIFOLD NEPTUNE 2 SYS 1 PORT 702-025-000

## (undated) DEVICE — ENDO TRAP POLYP E-TRAP 00711099

## (undated) RX ORDER — FENTANYL CITRATE 50 UG/ML
INJECTION, SOLUTION INTRAMUSCULAR; INTRAVENOUS
Status: DISPENSED
Start: 2019-09-20

## (undated) RX ORDER — NITROGLYCERIN 0.4 MG/1
TABLET SUBLINGUAL
Status: DISPENSED
Start: 2020-09-17

## (undated) RX ORDER — METOPROLOL TARTRATE 100 MG
TABLET ORAL
Status: DISPENSED
Start: 2020-09-17